# Patient Record
Sex: MALE | Race: OTHER | URBAN - METROPOLITAN AREA
[De-identification: names, ages, dates, MRNs, and addresses within clinical notes are randomized per-mention and may not be internally consistent; named-entity substitution may affect disease eponyms.]

---

## 2022-11-19 ENCOUNTER — INPATIENT (INPATIENT)
Facility: HOSPITAL | Age: 27
LOS: 1 days | Discharge: LEFT BEFORE DISCHARGE | End: 2022-11-21
Attending: INTERNAL MEDICINE | Admitting: INTERNAL MEDICINE
Payer: MEDICAID

## 2022-11-19 VITALS
HEART RATE: 68 BPM | SYSTOLIC BLOOD PRESSURE: 128 MMHG | TEMPERATURE: 98 F | RESPIRATION RATE: 16 BRPM | OXYGEN SATURATION: 98 % | DIASTOLIC BLOOD PRESSURE: 82 MMHG

## 2022-11-19 DIAGNOSIS — Z90.49 ACQUIRED ABSENCE OF OTHER SPECIFIED PARTS OF DIGESTIVE TRACT: Chronic | ICD-10-CM

## 2022-11-19 LAB
ALBUMIN SERPL ELPH-MCNC: 4.6 G/DL — SIGNIFICANT CHANGE UP (ref 3.5–5.2)
ALP SERPL-CCNC: 73 U/L — SIGNIFICANT CHANGE UP (ref 30–115)
ALT FLD-CCNC: 20 U/L — SIGNIFICANT CHANGE UP (ref 0–41)
ANION GAP SERPL CALC-SCNC: 20 MMOL/L — HIGH (ref 7–14)
APPEARANCE UR: CLEAR — SIGNIFICANT CHANGE UP
AST SERPL-CCNC: 26 U/L — SIGNIFICANT CHANGE UP (ref 0–41)
BACTERIA # UR AUTO: NEGATIVE — SIGNIFICANT CHANGE UP
BASE EXCESS BLDV CALC-SCNC: -0.8 MMOL/L — SIGNIFICANT CHANGE UP (ref -2–3)
BASE EXCESS BLDV CALC-SCNC: 1.9 MMOL/L — SIGNIFICANT CHANGE UP (ref -2–3)
BASOPHILS # BLD AUTO: 0.06 K/UL — SIGNIFICANT CHANGE UP (ref 0–0.2)
BASOPHILS NFR BLD AUTO: 0.5 % — SIGNIFICANT CHANGE UP (ref 0–1)
BILIRUB DIRECT SERPL-MCNC: 0.3 MG/DL — SIGNIFICANT CHANGE UP (ref 0–0.3)
BILIRUB INDIRECT FLD-MCNC: 1 MG/DL — SIGNIFICANT CHANGE UP (ref 0.2–1.2)
BILIRUB SERPL-MCNC: 1.3 MG/DL — HIGH (ref 0.2–1.2)
BILIRUB UR-MCNC: NEGATIVE — SIGNIFICANT CHANGE UP
BUN SERPL-MCNC: 9 MG/DL — LOW (ref 10–20)
CA-I SERPL-SCNC: 1.15 MMOL/L — SIGNIFICANT CHANGE UP (ref 1.15–1.33)
CA-I SERPL-SCNC: 1.18 MMOL/L — SIGNIFICANT CHANGE UP (ref 1.15–1.33)
CALCIUM SERPL-MCNC: 9.8 MG/DL — SIGNIFICANT CHANGE UP (ref 8.4–10.4)
CHLORIDE SERPL-SCNC: 97 MMOL/L — LOW (ref 98–110)
CO2 SERPL-SCNC: 18 MMOL/L — SIGNIFICANT CHANGE UP (ref 17–32)
COLOR SPEC: YELLOW — SIGNIFICANT CHANGE UP
COMMENT - URINE: SIGNIFICANT CHANGE UP
CREAT SERPL-MCNC: 0.9 MG/DL — SIGNIFICANT CHANGE UP (ref 0.7–1.5)
DIFF PNL FLD: NEGATIVE — SIGNIFICANT CHANGE UP
EGFR: 122 ML/MIN/1.73M2 — SIGNIFICANT CHANGE UP
EOSINOPHIL # BLD AUTO: 0.02 K/UL — SIGNIFICANT CHANGE UP (ref 0–0.7)
EOSINOPHIL NFR BLD AUTO: 0.2 % — SIGNIFICANT CHANGE UP (ref 0–8)
EPI CELLS # UR: 1 /HPF — SIGNIFICANT CHANGE UP (ref 0–5)
ETHANOL SERPL-MCNC: <10 MG/DL — SIGNIFICANT CHANGE UP
GAS PNL BLDV: 131 MMOL/L — LOW (ref 136–145)
GAS PNL BLDV: 132 MMOL/L — LOW (ref 136–145)
GAS PNL BLDV: SIGNIFICANT CHANGE UP
GLUCOSE SERPL-MCNC: 84 MG/DL — SIGNIFICANT CHANGE UP (ref 70–99)
GLUCOSE UR QL: NEGATIVE — SIGNIFICANT CHANGE UP
HCO3 BLDV-SCNC: 24 MMOL/L — SIGNIFICANT CHANGE UP (ref 22–29)
HCO3 BLDV-SCNC: 24 MMOL/L — SIGNIFICANT CHANGE UP (ref 22–29)
HCT VFR BLD CALC: 45.6 % — SIGNIFICANT CHANGE UP (ref 42–52)
HCT VFR BLDA CALC: 46 % — SIGNIFICANT CHANGE UP (ref 39–51)
HCT VFR BLDA CALC: 48 % — SIGNIFICANT CHANGE UP (ref 39–51)
HGB BLD CALC-MCNC: 15.2 G/DL — SIGNIFICANT CHANGE UP (ref 12.6–17.4)
HGB BLD CALC-MCNC: 16 G/DL — SIGNIFICANT CHANGE UP (ref 12.6–17.4)
HGB BLD-MCNC: 16.3 G/DL — SIGNIFICANT CHANGE UP (ref 14–18)
HYALINE CASTS # UR AUTO: 0 /LPF — SIGNIFICANT CHANGE UP (ref 0–7)
IMM GRANULOCYTES NFR BLD AUTO: 0.3 % — SIGNIFICANT CHANGE UP (ref 0.1–0.3)
KETONES UR-MCNC: ABNORMAL
LACTATE BLDV-MCNC: 2.3 MMOL/L — HIGH (ref 0.5–2)
LACTATE BLDV-MCNC: 5.8 MMOL/L — CRITICAL HIGH (ref 0.5–2)
LEUKOCYTE ESTERASE UR-ACNC: NEGATIVE — SIGNIFICANT CHANGE UP
LIDOCAIN IGE QN: 14 U/L — SIGNIFICANT CHANGE UP (ref 7–60)
LYMPHOCYTES # BLD AUTO: 1.9 K/UL — SIGNIFICANT CHANGE UP (ref 1.2–3.4)
LYMPHOCYTES # BLD AUTO: 15.9 % — LOW (ref 20.5–51.1)
MAGNESIUM SERPL-MCNC: 1.6 MG/DL — LOW (ref 1.8–2.4)
MCHC RBC-ENTMCNC: 30.8 PG — SIGNIFICANT CHANGE UP (ref 27–31)
MCHC RBC-ENTMCNC: 35.7 G/DL — SIGNIFICANT CHANGE UP (ref 32–37)
MCV RBC AUTO: 86.2 FL — SIGNIFICANT CHANGE UP (ref 80–94)
MONOCYTES # BLD AUTO: 0.75 K/UL — HIGH (ref 0.1–0.6)
MONOCYTES NFR BLD AUTO: 6.3 % — SIGNIFICANT CHANGE UP (ref 1.7–9.3)
NEUTROPHILS # BLD AUTO: 9.21 K/UL — HIGH (ref 1.4–6.5)
NEUTROPHILS NFR BLD AUTO: 76.8 % — HIGH (ref 42.2–75.2)
NITRITE UR-MCNC: NEGATIVE — SIGNIFICANT CHANGE UP
NRBC # BLD: 0 /100 WBCS — SIGNIFICANT CHANGE UP (ref 0–0)
PCO2 BLDV: 31 MMHG — LOW (ref 42–55)
PCO2 BLDV: 37 MMHG — LOW (ref 42–55)
PH BLDV: 7.41 — SIGNIFICANT CHANGE UP (ref 7.32–7.43)
PH BLDV: 7.5 — HIGH (ref 7.32–7.43)
PH UR: 7.5 — SIGNIFICANT CHANGE UP (ref 5–8)
PLATELET # BLD AUTO: 413 K/UL — HIGH (ref 130–400)
PO2 BLDV: 46 MMHG — SIGNIFICANT CHANGE UP
PO2 BLDV: 82 MMHG — SIGNIFICANT CHANGE UP
POTASSIUM BLDV-SCNC: 3.1 MMOL/L — LOW (ref 3.5–5.1)
POTASSIUM BLDV-SCNC: 4.8 MMOL/L — SIGNIFICANT CHANGE UP (ref 3.5–5.1)
POTASSIUM SERPL-MCNC: 3.8 MMOL/L — SIGNIFICANT CHANGE UP (ref 3.5–5)
POTASSIUM SERPL-SCNC: 3.8 MMOL/L — SIGNIFICANT CHANGE UP (ref 3.5–5)
PROT SERPL-MCNC: 7.4 G/DL — SIGNIFICANT CHANGE UP (ref 6–8)
PROT UR-MCNC: ABNORMAL
RBC # BLD: 5.29 M/UL — SIGNIFICANT CHANGE UP (ref 4.7–6.1)
RBC # FLD: 12.8 % — SIGNIFICANT CHANGE UP (ref 11.5–14.5)
RBC CASTS # UR COMP ASSIST: 4 /HPF — SIGNIFICANT CHANGE UP (ref 0–4)
SAO2 % BLDV: 75.1 % — SIGNIFICANT CHANGE UP
SAO2 % BLDV: 97.4 % — SIGNIFICANT CHANGE UP
SARS-COV-2 RNA SPEC QL NAA+PROBE: SIGNIFICANT CHANGE UP
SODIUM SERPL-SCNC: 135 MMOL/L — SIGNIFICANT CHANGE UP (ref 135–146)
SP GR SPEC: 1.03 — SIGNIFICANT CHANGE UP (ref 1.01–1.03)
UROBILINOGEN FLD QL: ABNORMAL
WBC # BLD: 11.98 K/UL — HIGH (ref 4.8–10.8)
WBC # FLD AUTO: 11.98 K/UL — HIGH (ref 4.8–10.8)
WBC UR QL: 4 /HPF — SIGNIFICANT CHANGE UP (ref 0–5)

## 2022-11-19 PROCEDURE — 93010 ELECTROCARDIOGRAM REPORT: CPT

## 2022-11-19 PROCEDURE — 71045 X-RAY EXAM CHEST 1 VIEW: CPT | Mod: 26

## 2022-11-19 PROCEDURE — 99285 EMERGENCY DEPT VISIT HI MDM: CPT

## 2022-11-19 PROCEDURE — 99221 1ST HOSP IP/OBS SF/LOW 40: CPT | Mod: GC

## 2022-11-19 RX ORDER — MAGNESIUM SULFATE 500 MG/ML
2 VIAL (ML) INJECTION ONCE
Refills: 0 | Status: COMPLETED | OUTPATIENT
Start: 2022-11-19 | End: 2022-11-19

## 2022-11-19 RX ORDER — LANOLIN ALCOHOL/MO/W.PET/CERES
3 CREAM (GRAM) TOPICAL AT BEDTIME
Refills: 0 | Status: DISCONTINUED | OUTPATIENT
Start: 2022-11-19 | End: 2022-11-21

## 2022-11-19 RX ORDER — FOLIC ACID 0.8 MG
1 TABLET ORAL DAILY
Refills: 0 | Status: DISCONTINUED | OUTPATIENT
Start: 2022-11-19 | End: 2022-11-21

## 2022-11-19 RX ORDER — ALBUTEROL 90 UG/1
2 AEROSOL, METERED ORAL EVERY 6 HOURS
Refills: 0 | Status: DISCONTINUED | OUTPATIENT
Start: 2022-11-19 | End: 2022-11-21

## 2022-11-19 RX ORDER — PANTOPRAZOLE SODIUM 20 MG/1
40 TABLET, DELAYED RELEASE ORAL
Refills: 0 | Status: DISCONTINUED | OUTPATIENT
Start: 2022-11-19 | End: 2022-11-21

## 2022-11-19 RX ORDER — SODIUM CHLORIDE 9 MG/ML
1000 INJECTION INTRAMUSCULAR; INTRAVENOUS; SUBCUTANEOUS
Refills: 0 | Status: DISCONTINUED | OUTPATIENT
Start: 2022-11-19 | End: 2022-11-21

## 2022-11-19 RX ORDER — ACETAMINOPHEN 500 MG
650 TABLET ORAL EVERY 6 HOURS
Refills: 0 | Status: DISCONTINUED | OUTPATIENT
Start: 2022-11-19 | End: 2022-11-21

## 2022-11-19 RX ORDER — ONDANSETRON 8 MG/1
4 TABLET, FILM COATED ORAL EVERY 8 HOURS
Refills: 0 | Status: DISCONTINUED | OUTPATIENT
Start: 2022-11-19 | End: 2022-11-21

## 2022-11-19 RX ORDER — THIAMINE MONONITRATE (VIT B1) 100 MG
100 TABLET ORAL DAILY
Refills: 0 | Status: DISCONTINUED | OUTPATIENT
Start: 2022-11-19 | End: 2022-11-21

## 2022-11-19 RX ORDER — SODIUM CHLORIDE 9 MG/ML
1000 INJECTION, SOLUTION INTRAVENOUS ONCE
Refills: 0 | Status: COMPLETED | OUTPATIENT
Start: 2022-11-19 | End: 2022-11-19

## 2022-11-19 RX ADMIN — SODIUM CHLORIDE 100 MILLILITER(S): 9 INJECTION INTRAMUSCULAR; INTRAVENOUS; SUBCUTANEOUS at 23:56

## 2022-11-19 RX ADMIN — Medication 25 GRAM(S): at 15:39

## 2022-11-19 RX ADMIN — SODIUM CHLORIDE 1000 MILLILITER(S): 9 INJECTION, SOLUTION INTRAVENOUS at 14:50

## 2022-11-19 RX ADMIN — Medication 1 MILLIGRAM(S): at 16:54

## 2022-11-19 RX ADMIN — Medication 50 MILLIGRAM(S): at 13:49

## 2022-11-19 RX ADMIN — SODIUM CHLORIDE 1000 MILLILITER(S): 9 INJECTION, SOLUTION INTRAVENOUS at 14:09

## 2022-11-19 RX ADMIN — Medication 25 MILLIGRAM(S): at 21:52

## 2022-11-19 NOTE — ED ADULT NURSE NOTE - OBJECTIVE STATEMENT
pt here for alcohol detox. pt last drink was wednesday reports he woke up with the shakes. -dizziness -headaches -nausea -vomiting -auditory hallucinations -tactile hallucinations -visual hallucinations. no hx of etoh withdrawal.

## 2022-11-19 NOTE — ED ADULT TRIAGE NOTE - CHIEF COMPLAINT QUOTE
pt requesting detox from alcohol. states his last alcoholic drink was on Wednesday 11/16. pt states he has been "having the shakes and sweating"

## 2022-11-19 NOTE — ED ADULT TRIAGE NOTE - ACCOMPANIED BY
Nasal Endoscopy preformed today.  On exam ears look good, no masses or lesions were seen.  Wax was removed from right ear.        Epley Maneuver for Left BPPV          1. Sit with legs out in front of you  2. Turn your head to left 45 degrees  3. Lay down with your head in this position  4. Slowly turn your head to right to 45 degrees  5. Roll onto your right side while maintaining head position  6. Sit up     Instructions to Follow after Epley Maneuver:  Sleep semi-recumbent for the next two nights. This means sleep with your head long-term between being flat and upright (a 45 degrees angle). This is most easily done by using a recliner chair or by using pillows arranged on a couch. During the day, try to keep your head vertical. For at least one week following, avoid provoking head positions such as head-extend position, in which your lying on your back, especially with your head turned towards the affected side. Avoid sleeping on the bad side. This may bring on symptoms.   Epley Maneuver for Right BPPV          1. Sit with legs out in front of you  2. Turn your head to the right 45 degrees  3. Lay down with your head in this position  4. Slowly turn your head to left 45 degrees  5. Roll onto your left side while maintaining head position  6. Sit up    Instructions to Follow after Epley Maneuver:  Sleep semi-recumbent for the next two nights. This means sleep with your head long-term between being flat and upright (a 45 degrees angle). This is most easily done by using a recliner chair or by using pillows arranged on a couch. During the day, try to keep your head vertical. For at least one week following, avoid provoking head positions such as head-extend position, in which your lying on your back, especially with your head turned towards the affected side. Avoid sleeping on the bad side. This may bring on symptoms.          
Self

## 2022-11-19 NOTE — ED ADULT NURSE REASSESSMENT NOTE - NS ED NURSE REASSESS COMMENT FT1
hand off report given by previous nurse. Pt is A&Ox4 came for detox and admit to tele waiting for bed. Pt was given ativan and librium for etoh withdrawal. Pt denies hallucinations, tremors, generalized pain at this moment. Comfort measure in place. will continue to monitor pt.

## 2022-11-19 NOTE — H&P ADULT - HISTORY OF PRESENT ILLNESS
27 year old male, past medical history alcohol use, who presents for     VITALS:   T(C): 37.1 (11-19-22 @ 15:57), Max: 37.1 (11-19-22 @ 15:57)  HR: 85 (11-19-22 @ 15:57) (68 - 85)  BP: 132/81 (11-19-22 @ 15:57) (128/82 - 132/81)  RR: 16 (11-19-22 @ 15:57) (16 - 16)  SpO2: 98% (11-19-22 @ 15:57) (98% - 98%)    in ED, vitals were WNLs. labs were significant for hypomagnesemia (1.6). patient received magnesium and admitted to telemetry for alcohol withdrawal management  27 year old male, past medical history alcohol use and asthma, who presents for shakiness. history goes back to age 19 when patient started drinking alcohol. patient drinks [place] whiskey. he used to drink moderately but recently he started drinking a bottle of whiskey per day attributed to going out with friends frequently to pubs. patient noticed that following each night out with his friends, he was having episodes of nausea and vomiting in the morning, hence, patient decided to stop alcohol completely 5 days ago. next day patient started complaining of tremor, vomiting, nausea, sweating, feeling of muscle tightness, headaches, buit denied hallucinations, tactile disturbances or confusion.    VITALS:   T(C): 37.1 (11-19-22 @ 15:57), Max: 37.1 (11-19-22 @ 15:57)  HR: 85 (11-19-22 @ 15:57) (68 - 85)  BP: 132/81 (11-19-22 @ 15:57) (128/82 - 132/81)  RR: 16 (11-19-22 @ 15:57) (16 - 16)  SpO2: 98% (11-19-22 @ 15:57) (98% - 98%)    in ED, vitals were WNLs. labs were significant for hypomagnesemia (1.6). patient received magnesium and admitted to telemetry for alcohol withdrawal management

## 2022-11-19 NOTE — H&P ADULT - NSHPSOCIALHISTORY_GEN_ALL_CORE
smokes weed  no illicit drug use  non COVID vaccinated  lives with his mother and two sisters  recently graduated highschool. not planning to go to college. not able find a job, attributed to covid as per patient

## 2022-11-19 NOTE — H&P ADULT - NSHPLABSRESULTS_GEN_ALL_CORE
LABS:                          16.3   11.98 )-----------( 413      ( 2022 14:12 )             45.6         135  |  97<L>  |  9<L>  ----------------------------<  84  3.8   |  18  |  0.9    Ca    9.8      2022 14:12  Mg     1.6         TPro  7.4  /  Alb  4.6  /  TBili  1.3<H>  /  DBili  0.3  /  AST  26  /  ALT  20  /  AlkPhos  73      LIVER FUNCTIONS - ( 2022 14:12 )  Alb: 4.6 g/dL / Pro: 7.4 g/dL / ALK PHOS: 73 U/L / ALT: 20 U/L / AST: 26 U/L / GGT: x             Urinalysis Basic - ( 2022 15:21 )    Color: Yellow / Appearance: Clear / S.028 / pH: x  Gluc: x / Ketone: Large  / Bili: Negative / Urobili: 12 mg/dL   Blood: x / Protein: 30 mg/dL / Nitrite: Negative   Leuk Esterase: Negative / RBC: 4 /HPF / WBC 4 /HPF   Sq Epi: x / Non Sq Epi: 1 /HPF / Bacteria: Negative

## 2022-11-19 NOTE — ED PROVIDER NOTE - NS ED ROS FT
Review of Systems:  	•	CONSTITUTIONAL: no fever   	•	SKIN: no rash   	•	ENT: no sore throat   	•	RESPIRATORY: no shortness of breath, no cough  	•	CARDIAC: no chest pain, no palpitations  	•	GI: no abd pain, +nausea, no vomiting, no diarrhea, no constipation  	•	GENITO-URINARY: no discharge, no dysuria; no hematuria, no increased urinary frequency  	•	MUSCULOSKELETAL: no joint paint, no swelling, no redness  	•	NEUROLOGIC: +weakness, +tremors, no headache, no syncope   	•	PSYCH: +anxiety, non suicidal, non homicidal, no hallucination, no depression

## 2022-11-19 NOTE — ED PROVIDER NOTE - ATTENDING APP SHARED VISIT CONTRIBUTION OF CARE
27 yr old m w/ a pmh significant for substance abuse who presents requesting detox. Pt states that he binged drink for ~ 2 weeks and then abruptly stopped drinking on Monday. Since then be complains of tremors, nausea and vomiting. Pt denies any other complaints.     VITAL SIGNS: I have reviewed nursing notes and confirm.  CONSTITUTIONAL: non-toxic, well appearing  SKIN: no rash, no petechiae.  EYES: EOMI, pink conjunctiva, anicteric  ENT: tongue midline, no exudates, MMM  NECK: Supple; no meningismus, no JVD  CARD: RRR, no murmurs, equal radial pulses bilaterally 2+  RESP: CTAB, no respiratory distress  ABD: Soft, non-tender, non-distended, no peritoneal signs, no HSM, no CVA tenderness  EXT: Normal ROM x4. No edema. No calves tenderness  NEURO: Alert, oriented x 3     a/p  27 yr old m that presents requesting detox  -labs  -imaging  -ivf  -reassess  -dispo pending

## 2022-11-19 NOTE — SBIRT NOTE ADULT - NSSBIRTDRGNOACTINTDET_GEN_A_CORE
Pt did not indicated any substance use at the time of encounter with this LCSW. However, further assessment may be helpful once Pt engages with the CATCH team regarding ETOH misuse.

## 2022-11-19 NOTE — SBIRT NOTE ADULT - NSSBIRTBRIEFINTDET_GEN_A_CORE
Supportive counseling and psychoeducation provided regarding overall impact of ETOH use on health and wellbeing. Pt indicated no past treatment for ETOH, may be receptive pending CATCH team intervention.

## 2022-11-19 NOTE — SBIRT NOTE ADULT - NSSBIRTFEELGUILT_GEN_A_CORE
Pt reports that he feels bad after drinking, which is also reinforced by commentary received from his family about his drinking use./Daily or almost daily

## 2022-11-19 NOTE — ED PROVIDER NOTE - NS ED MD DISPO ADMITTING SERVICE
This is a historical note converted from [employer]. Formatting and pictures may have been removed.  Please reference [place] for original formatting and attached multimedia. Admit and Discharge Dates  Admit Date: 05/12/2019  Discharge Date: 05/13/2019  ?  Physicians  Attending Physician - Mary OSBORNE, Hilario ARRIAGA, Svetlana OSBORNE, Skyler Wilkes  Admitting Physician - Hilario Hernandez MD?  Consulting Physician - Rebecca OSBORNE, Dolores  Primary Care Physician - Tomas Bolaños MD  Discharge Diagnosis  1.?CHF NYHA class I?I50.9  2.?Aortic stenosis?I35.0  3.?CAD (coronary artery disease)?I25.10  4.?Peripheral vascular disease?I73.9  5.?History of hypertension?Z86.79  6.?Hyperlipidemia?E78.5  7. Incidental Pulmonary Nodules  Surgical Procedures  No procedures recorded for this visit.  Immunizations  No immunizations recorded for this visit.  ?  Admission Information  ? Mr. Justyn Perez?is a 76-year-old gentleman with a past medical history significant for aortic stenosis, hypertension, hyperlipidemia, peripheral vascular disease who presents to the ED with complaints of shortness of breath ongoing since Thursday. ?Patient states that he has had occasional episodes in the past, however over the last few days it is been getting worse.??He usually exercises at the gym and lifts weights with no issues, however noticed?progressive PAZ?and subsequently decided to get evaluated. ?Denies any chest pain, palpitations,?headaches, changes in vision, nausea/vomiting. ?Of note, he did try to use some home oxygen?last night?to help with his symptoms, but states that it did not do anything. ?He does endorse?a recent?difficulty swallowing, feels like?the food gets stuck in the middle of his throat.? Unable to differentiate differences between solids and liquids. ?Denies any pain with swallowing.? Otherwise he denies any fevers or chills?and is?in his normal state of health. ?Reports compliance with most of his medications. ?Does state that he takes his  antihypertensives when he needs to and has not been taking his metformin or statin?due to weight loss.  ?? In the ED, VS significant for hypertension.? Lab work was significant for a proBNP of 3660, troponin was slightly elevated 0.063.? Chest x-ray was performed as well as CT thorax which revealed moderate effusions bilaterally.? EKG revealed signs of left ventricular hypertrophy with?T wave changes consistent with?cardiac strain.?Medicine was consulted for evaluation of elevated BNP and troponin.  Hospital Course  ? Patient was admitted to telemetry for observation. ?He was diagnosed with?an acute exacerbation of congestive heart failure?and treated with IV Lasix that resulted in good urine output. He was continued on aspirin, atorvastatin and initiated on lisinopril.? Beta-blockers were held in the setting of acute exacerbation.? TTE ordered with results as described below.? Cardiology was consulted for further recommendations in the setting of aortic stenosis and known significant peripheral vascular disease to include carotid artery stenosis, mesenteric artery stenosis, renal artery stenosis. Patient responded well to treatment and symptomatically improved.? His shortness of breath resolved, lower extremity edema resolved and he was ambulating well independently.? Subsequently he was deemed medically fit for discharge home.  Significant Findings  Echocardiogram on 5/13/2019: LVEF approximately 50%, LV is normal in size with concentric hypertrophy, blood from is dilated.? Moderate calcific aortic valve stenosis with moderate aortic regurg noted.? Mitral annular calcification present.? Small pericardial effusion with no hemodynamic significance.  Time Spent on discharge  >?30 minutes  Objective  Vitals & Measurements  T:?36.6? ?C (Oral)? TMIN:?36.3? ?C (Oral)? TMAX:?37.0? ?C (Oral)? HR:?63(Peripheral)? RR:?18? BP:?100/62? SpO2:?93%?  Physical Exam  See progress note from today  Patient Discharge  Condition  Clinically and hemodynamically stable  Discharge Disposition  Home   Discharge Medication Reconciliation  Prescribed  aspirin (aspirin 81 mg oral tablet, CHEWABLE)?81 mg, Oral, Daily  atorvastatin (atorvastatin 80 mg oral tablet)?80 mg, Oral, Daily  furosemide (Lasix 20 mg oral tablet)?20 mg, Oral, Every other day  lisinopril (lisinopril 10 mg oral tablet)?10 mg, Oral, Daily  ?   Discontinue  carvedilol (carvedilol 25 mg oral tablet)?25 mg, Oral, BID  ?  Education and Orders Provided  Aortic Valve Stenosis  Shortness of Breath, Adult, Easy-to-Read  Heart Failure, Easy-to-Read  Low-Sodium Eating Plan  Blank for instructions (PHYSICIAN) (Custom)  Discharge - 05/13/19 13:16:00 CDT, Home?  ?  Follow up  [facility], within 1 month  ????Post [facility] visit, will continue as PCP  Report to Emergency Department if symptoms return or worsen  [facility], within 1 to 2 weeks  ?   *Will need repeat CT thorax in 6 months to evaluate stability of pulmonary nodules  ?      I was present with the Resident during discharge day management.  ???  [x ] I discussed the case with the Resident and agree with the discharge plan as above.  [ ] I discussed the case with the Resident and agree with the discharge plan as above except:  ???  Time spent on discharge [40 ] minutes  ?   Only moderate aortic stenosis noted, followup with cardiology clinic for same  Symptomatically improved with diuresis  Plan to continue goal directed medication at home  ?   MD Missael  ID Staff   MED

## 2022-11-19 NOTE — ED PROVIDER NOTE - NS ED ATTENDING STATEMENT MOD
This was a shared visit with the TRACEE. I reviewed and verified the documentation and independently performed the documented:

## 2022-11-19 NOTE — H&P ADULT - ATTENDING COMMENTS
HPI:   27 year old male, past medical history alcohol use and asthma, who presents for shakiness. history goes back to age 19 when patient started drinking alcohol. patient drinks [place] whiskey. he used to drink moderately but recently he started drinking a bottle of whiskey per day attributed to going out with friends frequently to pubs. patient noticed that following each night out with his friends, he was having episodes of nausea and vomiting in the morning, hence, patient decided to stop alcohol completely 5 days ago. next day patient started complaining of tremor, vomiting, nausea, sweating, feeling of muscle tightness, headaches, buit denied hallucinations, tactile disturbances or confusion.    VITALS:   T(C): 37.1 (11-19-22 @ 15:57), Max: 37.1 (11-19-22 @ 15:57)  HR: 85 (11-19-22 @ 15:57) (68 - 85)  BP: 132/81 (11-19-22 @ 15:57) (128/82 - 132/81)  RR: 16 (11-19-22 @ 15:57) (16 - 16)  SpO2: 98% (11-19-22 @ 15:57) (98% - 98%)    in ED, vitals were WNLs. labs were significant for hypomagnesemia (1.6). patient received magnesium and admitted to telemetry for alcohol withdrawal management (19 Nov 2022 19:21)    REVIEW OF SYSTEMS: see cc/HPI   CONSTITUTIONAL: No weakness, fevers or chills  EYES/ENT: No visual changes;  No vertigo or throat pain   NECK: No pain or stiffness  RESPIRATORY: No cough, wheezing, hemoptysis; No shortness of breath  CARDIOVASCULAR: No chest pain or palpitations  GASTROINTESTINAL: No abdominal or epigastric pain. No nausea, vomiting, or hematemesis; No diarrhea or constipation. No melena or hematochezia.  GENITOURINARY: No dysuria, frequency or hematuria  NEUROLOGICAL: No numbness or weakness  SKIN: No itching, rashes    Physical Exam:  General: WN/WD NAD  Neurology: A&Ox3, nonfocal, follows commands  Eyes: PERRLA/ EOMI  ENT/Neck: Neck supple, trachea midline, No JVD  Respiratory: CTA B/L, No wheezing, rales, rhonchi  CV: Normal rate regular rhythm, S1S2, no murmurs, rubs or gallops  Abdominal: Soft, NT, ND +BS,   Extremities: No edema, + peripheral pulses  Skin: No Rashes, Hematoma, Ecchymosis    A/P  Alcohol withdrawal   Alcohol use disorder    Hypomagnesemia 2/2 EtOH abuse     Suspected vitamin / Folate / thiamine    DVT prophylaxis HPI:   27 year old male, past medical history alcohol use and asthma, who presents for shakiness. history goes back to age 19 when patient started drinking alcohol. patient drinks [place] whiskey. he used to drink moderately but recently he started drinking a bottle of whiskey per day attributed to going out with friends frequently to pubs. patient noticed that following each night out with his friends, he was having episodes of nausea and vomiting in the morning, hence, patient decided to stop alcohol completely 5 days ago. next day patient started complaining of tremor, vomiting, nausea, sweating, feeling of muscle tightness, headaches, but denied hallucinations, tactile disturbances or confusion.    VITALS:   T(C): 37.1 (11-19-22 @ 15:57), Max: 37.1 (11-19-22 @ 15:57)  HR: 85 (11-19-22 @ 15:57) (68 - 85)  BP: 132/81 (11-19-22 @ 15:57) (128/82 - 132/81)  RR: 16 (11-19-22 @ 15:57) (16 - 16)  SpO2: 98% (11-19-22 @ 15:57) (98% - 98%)    in ED, vitals were WNLs. labs were significant for hypomagnesemia (1.6). patient received magnesium and admitted to telemetry for alcohol withdrawal management (19 Nov 2022 19:21)    REVIEW OF SYSTEMS: see cc/HPI   CONSTITUTIONAL: No weakness, fevers or chills  EYES/ENT: No visual changes;  No vertigo or throat pain   NECK: No pain or stiffness  RESPIRATORY: No cough, wheezing, hemoptysis; No shortness of breath  CARDIOVASCULAR: No chest pain or palpitations  GASTROINTESTINAL: No abdominal or epigastric pain. No nausea, vomiting, or hematemesis; No diarrhea or constipation. No melena or hematochezia.  GENITOURINARY: No dysuria, frequency or hematuria  NEUROLOGICAL: No numbness or weakness  SKIN: No itching, rashes    Physical Exam:  General: WN/WD NAD, Resting comfortably   Neurology: A&Ox3, nonfocal, follows commands  Eyes: PERRLA/ EOMI  ENT/Neck: Neck supple, trachea midline, No JVD  Respiratory: CTA B/L, No wheezing, rales, rhonchi  CV: Normal rate regular rhythm, S1S2, no murmurs, rubs or gallops  Abdominal: Soft, NT, ND +BS,   Extremities: No edema, + peripheral pulses  Skin: No Rashes, Hematoma, Ecchymosis    A/P  Alcohol withdrawal   Alcohol use disorder  -IV fluids   -CIWA protocol w/ Ativan PRN   -CATCH team eval     Hypomagnesemia 2/2 EtOH abuse   -supplement and recheck levels     Suspected vitamin / Folate / thiamine  -supplement daily - MVI/Thiamine / Folate/ B12    DVT prophylaxis

## 2022-11-19 NOTE — ED PROVIDER NOTE - OBJECTIVE STATEMENT
27 year old male, past medical history alcohol use, who presents for eval. patient admits to drinking alcohol daily, ~1 bottle elvira, stopped drinking x3 days ago now with tremors. patient with anxiety, tremors, nausea/vomiting. denies fever, chills, chest pain, shortness of breath, back pain, abd pain, diarrhea, urinary symptoms, skin changes, syncope. +marijuana use.

## 2022-11-19 NOTE — SBIRT NOTE ADULT - NSSBIRTCONCERNEDDRINK_GEN_A_CORE
Mother has brought him to ER due to his drinking, has also thrown out his alcohol/Yes, during the last year

## 2022-11-19 NOTE — H&P ADULT - NSHPPHYSICALEXAM_GEN_ALL_CORE
GENERAL: NAD, lying in bed comfortably  HEAD:  Atraumatic, Normocephalic  EYES: EOMI, PERRLA, conjunctiva and sclera clear  ENT: Moist mucous membranes  NECK: Supple, No JVD  CHEST/LUNG: Clear to auscultation bilaterally; No rales, rhonchi, wheezing, or rubs. Unlabored respirations  HEART: Regular rate and rhythm; No murmurs, rubs, or gallops  ABDOMEN: BSx4; Soft, nontender, nondistended  EXTREMITIES:  2+ Peripheral Pulses, brisk capillary refill. No clubbing, cyanosis, or edema  NERVOUS SYSTEM:  A&Ox3, no focal deficits   SKIN: No rashes or lesions

## 2022-11-19 NOTE — ED PROVIDER NOTE - PHYSICAL EXAMINATION
CONSTITUTIONAL: tremulous male, no acute distress  SKIN: skin exam is warm and dry  EYES: PERRL, EOMI, conjunctiva and sclera clear.  ENT: Dry MM   CARD: S1, S2 normal, no murmur  RESP: No wheezes, rales or rhonchi. Good air movement bilaterally  ABD: soft; non-distended; non-tender.    EXT: Normal ROM.   NEURO: awake, alert, following commands, oriented, grossly unremarkable. +tremors,, no fasciculations. No Focal deficits. GCS 15.   PSYCH: anxious appearing, cooperative and appropriate

## 2022-11-19 NOTE — H&P ADULT - ASSESSMENT
27 year old male, past medical history alcohol use, who presents for     #alcohol withdrawal  #h/o alcohol abuse  - librium taper  - ativan PRN  - CATCH team eval    #hypomagnesemia  - repleted  - monitor    #suspected folate and thiamine deficiency  - c/w folate and thiamine supplementation    #MISC  DVT PPX  GI PPX  DIET  CODE 27 year old male, past medical history alcohol use, who presents for tremor, sweating, N/V after stopping alcohol 5 days ago    #alcohol withdrawal  #h/o alcohol abuse  - drinks [place] whiskey 1 bottle/day -> started recently as patient is going frequently to pubs with his friends but patient started drinking alcohol at age 19  - unemployed, recently graduated highschool, no plans for college  - lives with mother and 2 sisters  - librium taper  - ativan PRN  - CATCH team eval    #hypomagnesemia  - repleted  - monitor    #suspected folate and thiamine deficiency  - c/w folate and thiamine supplementation    #MISC  DVT PPX  GI PPX  DIET  CODE 27 year old male, past medical history alcohol use, who presents for tremor, sweating, N/V after stopping alcohol 5 days ago    #alcohol withdrawal  #h/o alcohol abuse  - drinks [place] whiskey 1 bottle/day -> started recently as patient is going frequently to pubs with his friends but patient started drinking alcohol at age 19  - patient was having nausea and  vomiting next morning after drinking alcohol  - decided to stop completely 5 days ago  - started complaining of tremor, N/V, headache, sweating  - unemployed, recently graduated highschool, no plans for college  - lives with mother and 2 sisters  - hemodynamically stable  - s/p magnesium in ED  - c/w librium taper  - c/w ativan PRN  - CATCH team eval    #hypomagnesemia  - repleted  - monitor    #suspected folate and thiamine deficiency  - c/w folate and thiamine supplementation    #MISC  DVT PPX not indicated  GI PPX Protonix  DIET Regular  CODE FULL

## 2022-11-20 LAB
ALBUMIN SERPL ELPH-MCNC: 3.8 G/DL — SIGNIFICANT CHANGE UP (ref 3.5–5.2)
ALP SERPL-CCNC: 59 U/L — SIGNIFICANT CHANGE UP (ref 30–115)
ALT FLD-CCNC: 16 U/L — SIGNIFICANT CHANGE UP (ref 0–41)
ANION GAP SERPL CALC-SCNC: 12 MMOL/L — SIGNIFICANT CHANGE UP (ref 7–14)
AST SERPL-CCNC: 20 U/L — SIGNIFICANT CHANGE UP (ref 0–41)
BASOPHILS # BLD AUTO: 0.07 K/UL — SIGNIFICANT CHANGE UP (ref 0–0.2)
BASOPHILS NFR BLD AUTO: 0.7 % — SIGNIFICANT CHANGE UP (ref 0–1)
BILIRUB SERPL-MCNC: 1.2 MG/DL — SIGNIFICANT CHANGE UP (ref 0.2–1.2)
BUN SERPL-MCNC: 6 MG/DL — LOW (ref 10–20)
CALCIUM SERPL-MCNC: 9 MG/DL — SIGNIFICANT CHANGE UP (ref 8.4–10.4)
CHLORIDE SERPL-SCNC: 99 MMOL/L — SIGNIFICANT CHANGE UP (ref 98–110)
CO2 SERPL-SCNC: 24 MMOL/L — SIGNIFICANT CHANGE UP (ref 17–32)
CREAT SERPL-MCNC: 0.7 MG/DL — SIGNIFICANT CHANGE UP (ref 0.7–1.5)
EGFR: 131 ML/MIN/1.73M2 — SIGNIFICANT CHANGE UP
EOSINOPHIL # BLD AUTO: 0.16 K/UL — SIGNIFICANT CHANGE UP (ref 0–0.7)
EOSINOPHIL NFR BLD AUTO: 1.5 % — SIGNIFICANT CHANGE UP (ref 0–8)
GLUCOSE SERPL-MCNC: 81 MG/DL — SIGNIFICANT CHANGE UP (ref 70–99)
HCT VFR BLD CALC: 40.4 % — LOW (ref 42–52)
HGB BLD-MCNC: 14.8 G/DL — SIGNIFICANT CHANGE UP (ref 14–18)
IMM GRANULOCYTES NFR BLD AUTO: 0.3 % — SIGNIFICANT CHANGE UP (ref 0.1–0.3)
LACTATE SERPL-SCNC: 1.8 MMOL/L — SIGNIFICANT CHANGE UP (ref 0.7–2)
LACTATE SERPL-SCNC: 3.1 MMOL/L — HIGH (ref 0.7–2)
LYMPHOCYTES # BLD AUTO: 2.16 K/UL — SIGNIFICANT CHANGE UP (ref 1.2–3.4)
LYMPHOCYTES # BLD AUTO: 20.5 % — SIGNIFICANT CHANGE UP (ref 20.5–51.1)
MAGNESIUM SERPL-MCNC: 1.9 MG/DL — SIGNIFICANT CHANGE UP (ref 1.8–2.4)
MCHC RBC-ENTMCNC: 31 PG — SIGNIFICANT CHANGE UP (ref 27–31)
MCHC RBC-ENTMCNC: 36.6 G/DL — SIGNIFICANT CHANGE UP (ref 32–37)
MCV RBC AUTO: 84.5 FL — SIGNIFICANT CHANGE UP (ref 80–94)
MONOCYTES # BLD AUTO: 1 K/UL — HIGH (ref 0.1–0.6)
MONOCYTES NFR BLD AUTO: 9.5 % — HIGH (ref 1.7–9.3)
NEUTROPHILS # BLD AUTO: 7.11 K/UL — HIGH (ref 1.4–6.5)
NEUTROPHILS NFR BLD AUTO: 67.5 % — SIGNIFICANT CHANGE UP (ref 42.2–75.2)
NRBC # BLD: 0 /100 WBCS — SIGNIFICANT CHANGE UP (ref 0–0)
PLATELET # BLD AUTO: 323 K/UL — SIGNIFICANT CHANGE UP (ref 130–400)
POTASSIUM SERPL-MCNC: 3.1 MMOL/L — LOW (ref 3.5–5)
POTASSIUM SERPL-SCNC: 3.1 MMOL/L — LOW (ref 3.5–5)
PROT SERPL-MCNC: 5.9 G/DL — LOW (ref 6–8)
RBC # BLD: 4.78 M/UL — SIGNIFICANT CHANGE UP (ref 4.7–6.1)
RBC # FLD: 12.7 % — SIGNIFICANT CHANGE UP (ref 11.5–14.5)
SODIUM SERPL-SCNC: 135 MMOL/L — SIGNIFICANT CHANGE UP (ref 135–146)
WBC # BLD: 10.53 K/UL — SIGNIFICANT CHANGE UP (ref 4.8–10.8)
WBC # FLD AUTO: 10.53 K/UL — SIGNIFICANT CHANGE UP (ref 4.8–10.8)

## 2022-11-20 PROCEDURE — 99233 SBSQ HOSP IP/OBS HIGH 50: CPT

## 2022-11-20 RX ORDER — ZOLPIDEM TARTRATE 10 MG/1
5 TABLET ORAL ONCE
Refills: 0 | Status: DISCONTINUED | OUTPATIENT
Start: 2022-11-20 | End: 2022-11-20

## 2022-11-20 RX ADMIN — Medication 100 MILLIGRAM(S): at 12:11

## 2022-11-20 RX ADMIN — Medication 3 MILLIGRAM(S): at 02:42

## 2022-11-20 RX ADMIN — Medication 25 MILLIGRAM(S): at 02:02

## 2022-11-20 RX ADMIN — Medication 25 MILLIGRAM(S): at 10:22

## 2022-11-20 RX ADMIN — Medication 25 MILLIGRAM(S): at 13:03

## 2022-11-20 RX ADMIN — Medication 2 MILLIGRAM(S): at 16:50

## 2022-11-20 RX ADMIN — Medication 25 MILLIGRAM(S): at 05:50

## 2022-11-20 RX ADMIN — Medication 1 TABLET(S): at 12:11

## 2022-11-20 RX ADMIN — Medication 25 MILLIGRAM(S): at 18:00

## 2022-11-20 RX ADMIN — ZOLPIDEM TARTRATE 5 MILLIGRAM(S): 10 TABLET ORAL at 04:59

## 2022-11-20 RX ADMIN — Medication 1 MILLIGRAM(S): at 12:11

## 2022-11-20 RX ADMIN — PANTOPRAZOLE SODIUM 40 MILLIGRAM(S): 20 TABLET, DELAYED RELEASE ORAL at 06:50

## 2022-11-20 RX ADMIN — Medication 20 MILLIGRAM(S): at 22:45

## 2022-11-20 RX ADMIN — Medication 650 MILLIGRAM(S): at 14:38

## 2022-11-20 NOTE — PROGRESS NOTE ADULT - ASSESSMENT
27 year old male, past medical history alcohol use, who presents for tremor, sweating, N/V after stopping alcohol 5 days ago    #alcohol withdrawal  #h/o alcohol abuse  - drinks [place] whiskey 1 bottle/day -> started recently as patient is going frequently to pubs with his friends but patient started drinking alcohol at age 19  - patient was having nausea and  vomiting next morning after drinking alcohol  - decided to stop completely 5 days ago  - started complaining of tremor, N/V, headache, sweating  - unemployed, recently graduated highschool, no plans for college  - lives with mother and 2 sisters  - hemodynamically stable  - s/p magnesium in ED  - c/w librium taper  - c/w ativan PRN  - CATCH team eval    #suspected magnesium deff  - repleted  - monitor    #suspected folate and thiamine deficiency  - c/w folate and thiamine supplementation    #lactemia- will trend 5.8-->2.35    #Progress Note Handoff  Pending (specify):  librium helen, lactate  Disposition: home  Anticipated date: 48-72hrs

## 2022-11-20 NOTE — PROGRESS NOTE ADULT - SUBJECTIVE AND OBJECTIVE BOX
Pt seen and examined at bedside. No CP or SOB.      PAST MEDICAL & SURGICAL HISTORY:  Asthma    History of appendectomy        VITAL SIGNS (Last 24 hrs):  T(C): 36.8 (11-20-22 @ 09:35), Max: 37.1 (11-19-22 @ 15:57)  HR: 80 (11-20-22 @ 09:35) (72 - 85)  BP: 98/67 (11-20-22 @ 09:35) (98/67 - 132/81)  RR: 18 (11-20-22 @ 09:35) (16 - 18)  SpO2: 98% (11-20-22 @ 09:35) (96% - 98%)  Wt(kg): --  Daily     Daily     I&O's Summary      PHYSICAL EXAM:  GENERAL: NAD, well-developed  HEAD:  Atraumatic, Normocephalic  EYES: EOMI, PERRLA, conjunctiva and sclera clear  NECK: Supple, No JVD  CHEST/LUNG: Clear to auscultation bilaterally; No wheeze  HEART: Regular rate and rhythm; No murmurs, rubs, or gallops  ABDOMEN: Soft, Nontender, Nondistended; Bowel sounds present  EXTREMITIES:  2+ Peripheral Pulses, No clubbing, cyanosis, or edema  PSYCH: AAOx3  NEUROLOGY: non-focal  SKIN: No rashes or lesions    Labs Reviewed  Spoke to patient in regards to abnormal labs.    CBC Full  -  ( 20 Nov 2022 06:16 )  WBC Count : 10.53 K/uL  Hemoglobin : 14.8 g/dL  Hematocrit : 40.4 %  Platelet Count - Automated : 323 K/uL  Mean Cell Volume : 84.5 fL  Mean Cell Hemoglobin : 31.0 pg  Mean Cell Hemoglobin Concentration : 36.6 g/dL  Auto Neutrophil # : 7.11 K/uL  Auto Lymphocyte # : 2.16 K/uL  Auto Monocyte # : 1.00 K/uL  Auto Eosinophil # : 0.16 K/uL  Auto Basophil # : 0.07 K/uL  Auto Neutrophil % : 67.5 %  Auto Lymphocyte % : 20.5 %  Auto Monocyte % : 9.5 %  Auto Eosinophil % : 1.5 %  Auto Basophil % : 0.7 %    BMP:    11-20 @ 06:16    Blood Urea Nitrogen - 6  Calcium - 9.0  Carbond Dioxide - 24  Chloride - 99  Creatinine - 0.7  Glucose - 81  Potassium - 3.1  Sodium - 135      Hemoglobin A1c -     Urine Culture:        COVID Labs  CRP:      D-Dimer:            Imaging reviewed independently and reviewed read  < from: Xray Chest 1 View- PORTABLE-Urgent (Xray Chest 1 View- PORTABLE-Urgent .) (11.19.22 @ 16:17) >  Impression:  Low lung volume without definite evidence of focal consolidation pleural   effusion or pneumothorax.    < end of copied text >        MEDICATIONS  (STANDING):  chlordiazePOXIDE   Oral   chlordiazePOXIDE 25 milliGRAM(s) Oral every 4 hours  chlordiazePOXIDE 20 milliGRAM(s) Oral every 4 hours  folic acid 1 milliGRAM(s) Oral daily  multivitamin 1 Tablet(s) Oral daily  pantoprazole    Tablet 40 milliGRAM(s) Oral before breakfast  sodium chloride 0.9%. 1000 milliLiter(s) (100 mL/Hr) IV Continuous <Continuous>  thiamine 100 milliGRAM(s) Oral daily    MEDICATIONS  (PRN):  acetaminophen     Tablet .. 650 milliGRAM(s) Oral every 6 hours PRN Temp greater or equal to 38C (100.4F), Mild Pain (1 - 3)  albuterol    90 MICROgram(s) HFA Inhaler 2 Puff(s) Inhalation every 6 hours PRN Shortness of Breath and/or Wheezing  aluminum hydroxide/magnesium hydroxide/simethicone Suspension 30 milliLiter(s) Oral every 4 hours PRN Dyspepsia  LORazepam   Injectable 2 milliGRAM(s) IV Push every 2 hours PRN CIWA-Ar score increase by 2 points and a total score of 7 or less  melatonin 3 milliGRAM(s) Oral at bedtime PRN Insomnia  ondansetron Injectable 4 milliGRAM(s) IV Push every 8 hours PRN Nausea and/or Vomiting

## 2022-11-21 VITALS — SYSTOLIC BLOOD PRESSURE: 126 MMHG | DIASTOLIC BLOOD PRESSURE: 76 MMHG | HEART RATE: 64 BPM

## 2022-11-21 LAB
ALBUMIN SERPL ELPH-MCNC: 4.2 G/DL — SIGNIFICANT CHANGE UP (ref 3.5–5.2)
ALP SERPL-CCNC: 63 U/L — SIGNIFICANT CHANGE UP (ref 30–115)
ALT FLD-CCNC: 20 U/L — SIGNIFICANT CHANGE UP (ref 0–41)
ANION GAP SERPL CALC-SCNC: 13 MMOL/L — SIGNIFICANT CHANGE UP (ref 7–14)
AST SERPL-CCNC: 37 U/L — SIGNIFICANT CHANGE UP (ref 0–41)
BASOPHILS # BLD AUTO: 0.08 K/UL — SIGNIFICANT CHANGE UP (ref 0–0.2)
BASOPHILS NFR BLD AUTO: 0.9 % — SIGNIFICANT CHANGE UP (ref 0–1)
BILIRUB SERPL-MCNC: 0.8 MG/DL — SIGNIFICANT CHANGE UP (ref 0.2–1.2)
BUN SERPL-MCNC: 6 MG/DL — LOW (ref 10–20)
CALCIUM SERPL-MCNC: 8.8 MG/DL — SIGNIFICANT CHANGE UP (ref 8.4–10.5)
CHLORIDE SERPL-SCNC: 102 MMOL/L — SIGNIFICANT CHANGE UP (ref 98–110)
CO2 SERPL-SCNC: 24 MMOL/L — SIGNIFICANT CHANGE UP (ref 17–32)
CREAT SERPL-MCNC: 0.6 MG/DL — LOW (ref 0.7–1.5)
CULTURE RESULTS: SIGNIFICANT CHANGE UP
EGFR: 137 ML/MIN/1.73M2 — SIGNIFICANT CHANGE UP
EOSINOPHIL # BLD AUTO: 0.22 K/UL — SIGNIFICANT CHANGE UP (ref 0–0.7)
EOSINOPHIL NFR BLD AUTO: 2.4 % — SIGNIFICANT CHANGE UP (ref 0–8)
GLUCOSE SERPL-MCNC: 70 MG/DL — SIGNIFICANT CHANGE UP (ref 70–99)
HCT VFR BLD CALC: 41.9 % — LOW (ref 42–52)
HGB BLD-MCNC: 15.5 G/DL — SIGNIFICANT CHANGE UP (ref 14–18)
IMM GRANULOCYTES NFR BLD AUTO: 0.6 % — HIGH (ref 0.1–0.3)
LYMPHOCYTES # BLD AUTO: 2.39 K/UL — SIGNIFICANT CHANGE UP (ref 1.2–3.4)
LYMPHOCYTES # BLD AUTO: 26.4 % — SIGNIFICANT CHANGE UP (ref 20.5–51.1)
MAGNESIUM SERPL-MCNC: 2 MG/DL — SIGNIFICANT CHANGE UP (ref 1.8–2.4)
MCHC RBC-ENTMCNC: 30.9 PG — SIGNIFICANT CHANGE UP (ref 27–31)
MCHC RBC-ENTMCNC: 37 G/DL — SIGNIFICANT CHANGE UP (ref 32–37)
MCV RBC AUTO: 83.5 FL — SIGNIFICANT CHANGE UP (ref 80–94)
MONOCYTES # BLD AUTO: 0.88 K/UL — HIGH (ref 0.1–0.6)
MONOCYTES NFR BLD AUTO: 9.7 % — HIGH (ref 1.7–9.3)
NEUTROPHILS # BLD AUTO: 5.44 K/UL — SIGNIFICANT CHANGE UP (ref 1.4–6.5)
NEUTROPHILS NFR BLD AUTO: 60 % — SIGNIFICANT CHANGE UP (ref 42.2–75.2)
NRBC # BLD: 0 /100 WBCS — SIGNIFICANT CHANGE UP (ref 0–0)
PLATELET # BLD AUTO: 358 K/UL — SIGNIFICANT CHANGE UP (ref 130–400)
POTASSIUM SERPL-MCNC: 3.5 MMOL/L — SIGNIFICANT CHANGE UP (ref 3.5–5)
POTASSIUM SERPL-SCNC: 3.5 MMOL/L — SIGNIFICANT CHANGE UP (ref 3.5–5)
PROT SERPL-MCNC: 6.1 G/DL — SIGNIFICANT CHANGE UP (ref 6–8)
RBC # BLD: 5.02 M/UL — SIGNIFICANT CHANGE UP (ref 4.7–6.1)
RBC # FLD: 12.8 % — SIGNIFICANT CHANGE UP (ref 11.5–14.5)
SODIUM SERPL-SCNC: 139 MMOL/L — SIGNIFICANT CHANGE UP (ref 135–146)
SPECIMEN SOURCE: SIGNIFICANT CHANGE UP
WBC # BLD: 9.06 K/UL — SIGNIFICANT CHANGE UP (ref 4.8–10.8)
WBC # FLD AUTO: 9.06 K/UL — SIGNIFICANT CHANGE UP (ref 4.8–10.8)

## 2022-11-21 PROCEDURE — 99221 1ST HOSP IP/OBS SF/LOW 40: CPT

## 2022-11-21 PROCEDURE — 99239 HOSP IP/OBS DSCHRG MGMT >30: CPT | Mod: GC

## 2022-11-21 PROCEDURE — 99253 IP/OBS CNSLTJ NEW/EST LOW 45: CPT

## 2022-11-21 RX ORDER — PREGABALIN 225 MG/1
1000 CAPSULE ORAL DAILY
Refills: 0 | Status: DISCONTINUED | OUTPATIENT
Start: 2022-11-21 | End: 2022-11-21

## 2022-11-21 RX ORDER — FOLIC ACID 0.8 MG
1 TABLET ORAL
Qty: 30 | Refills: 0
Start: 2022-11-21 | End: 2022-12-20

## 2022-11-21 RX ORDER — THIAMINE MONONITRATE (VIT B1) 100 MG
500 TABLET ORAL EVERY 8 HOURS
Refills: 0 | Status: DISCONTINUED | OUTPATIENT
Start: 2022-11-21 | End: 2022-11-21

## 2022-11-21 RX ORDER — GABAPENTIN 400 MG/1
1 CAPSULE ORAL
Qty: 90 | Refills: 0
Start: 2022-11-21 | End: 2022-12-20

## 2022-11-21 RX ORDER — ACAMPROSATE CALCIUM 333 MG/1
2 TABLET, DELAYED RELEASE ORAL
Qty: 180 | Refills: 0
Start: 2022-11-21 | End: 2022-12-20

## 2022-11-21 RX ORDER — FAMOTIDINE 10 MG/ML
20 INJECTION INTRAVENOUS ONCE
Refills: 0 | Status: COMPLETED | OUTPATIENT
Start: 2022-11-21 | End: 2022-11-21

## 2022-11-21 RX ORDER — NALTREXONE HYDROCHLORIDE 50 MG/1
50 TABLET, FILM COATED ORAL DAILY
Refills: 0 | Status: DISCONTINUED | OUTPATIENT
Start: 2022-11-21 | End: 2022-11-21

## 2022-11-21 RX ORDER — GABAPENTIN 400 MG/1
300 CAPSULE ORAL THREE TIMES A DAY
Refills: 0 | Status: DISCONTINUED | OUTPATIENT
Start: 2022-11-21 | End: 2022-11-21

## 2022-11-21 RX ORDER — THIAMINE MONONITRATE (VIT B1) 100 MG
1 TABLET ORAL
Qty: 30 | Refills: 0
Start: 2022-11-21 | End: 2022-12-20

## 2022-11-21 RX ORDER — NALTREXONE HYDROCHLORIDE 50 MG/1
1 TABLET, FILM COATED ORAL
Qty: 30 | Refills: 0
Start: 2022-11-21 | End: 2022-12-20

## 2022-11-21 RX ADMIN — Medication 105 MILLIGRAM(S): at 14:20

## 2022-11-21 RX ADMIN — SODIUM CHLORIDE 100 MILLILITER(S): 9 INJECTION INTRAMUSCULAR; INTRAVENOUS; SUBCUTANEOUS at 07:49

## 2022-11-21 RX ADMIN — GABAPENTIN 300 MILLIGRAM(S): 400 CAPSULE ORAL at 13:50

## 2022-11-21 RX ADMIN — Medication 20 MILLIGRAM(S): at 05:13

## 2022-11-21 RX ADMIN — Medication 1 TABLET(S): at 13:27

## 2022-11-21 RX ADMIN — PANTOPRAZOLE SODIUM 40 MILLIGRAM(S): 20 TABLET, DELAYED RELEASE ORAL at 07:45

## 2022-11-21 RX ADMIN — Medication 100 MILLIGRAM(S): at 13:27

## 2022-11-21 RX ADMIN — Medication 20 MILLIGRAM(S): at 02:25

## 2022-11-21 RX ADMIN — FAMOTIDINE 20 MILLIGRAM(S): 10 INJECTION INTRAVENOUS at 10:18

## 2022-11-21 RX ADMIN — Medication 1 MILLIGRAM(S): at 13:27

## 2022-11-21 RX ADMIN — Medication 30 MILLILITER(S): at 10:18

## 2022-11-21 NOTE — DISCHARGE NOTE NURSING/CASE MANAGEMENT/SOCIAL WORK - NSDCPETBCESMAN_GEN_ALL_CORE
Action    Action Taken 3/2/21:    -[facility] - they will start burning disc with imaging starting from 10/2020 - to most recent.     -[facility]: [id number]  2:12 PM         
Action 3/4/2021 10:50AM PARISA   Action Taken Received IMG disc from [facility]    3:11PM  I called pt Hernandez and he has imaging at the [facility]. He doesn't have imaging at [facility].            
If you are a smoker, it is important for your health to stop smoking. Please be aware that second hand smoke is also harmful.

## 2022-11-21 NOTE — DISCHARGE NOTE PROVIDER - CARE PROVIDER_API CALL
ANDRY YIP  Internal Medicine  N  THEODORA WILDE, Phys,    Phone: [phone]  Fax: [phone]  Follow Up Time: 1 week

## 2022-11-21 NOTE — DISCHARGE NOTE PROVIDER - HOSPITAL COURSE
27 year old male, past medical history alcohol use and asthma, who presents for shakiness. history goes back to age 19 when patient started drinking alcohol. patient drinks konyak whiskey. he used to drink moderately but recently he started drinking a bottle of whiskey per day attributed to going out with friends frequently to pubs. patient noticed that following each night out with his friends, he was having episodes of nausea and vomiting in the morning, hence, patient decided to stop alcohol completely 5 days ago. next day patient started complaining of tremor, vomiting, nausea, sweating, feeling of muscle tightness, headaches, buit denied hallucinations, tactile disturbances or confusion.    VITALS:   T(C): 37.1 (11-19-22 @ 15:57), Max: 37.1 (11-19-22 @ 15:57)  HR: 85 (11-19-22 @ 15:57) (68 - 85)  BP: 132/81 (11-19-22 @ 15:57) (128/82 - 132/81)  RR: 16 (11-19-22 @ 15:57) (16 - 16)  SpO2: 98% (11-19-22 @ 15:57) (98% - 98%)    in ED, vitals were WNLs. labs were significant for hypomagnesemia (1.6). patient received magnesium and admitted to telemetry for alcohol withdrawal management      #alcohol withdrawal  #h/o alcohol abuse  - drinks kony[place] whiskey 1 bottle/day -> started recently as patient is going frequently to pubs with his friends but patient started drinking alcohol at age 19  - patient was having nausea and  vomiting next morning after drinking alcohol  - decided to stop completely 5 days ago  - started complaining of tremor, N/V, headache, sweating  - unemployed, recently graduated highschool, no plans for college  - lives with mother and 2 sisters  - hemodynamically stable  - s/p magnesium in ED  - seen by addiction team  Per addiction team:  - recommend minimum ~3-4 day hospitalization to allow for full alcohol "detox"  - recommend to discontinue chlordiazepoxide taper and continue symptom-triggered alcohol withdrawal management with lorazepam 2 mg IV with change to q1h PRN per CIWA protocol for CIWA scores 8 or above (until patient no longer scoring 8 or above on CIWA for ~24 hours)  - recommend thiamine 500 mg IVP q8h x 9 doses (followed by thiamine 100 mg TID)  - recommend vitamin B12 1000 micrograms subcutaneous daily x 3 days  - continue multivitamin and folic acid  - recommend to start naltrexone 50 mg daily (on-label for AUD)- please discharge with 1-month prescription with [facility] f/u for prescription refill  - recommend to start gabapentin 300 mg TID (off-label for AUD)- please discharge with 1-month prescription with [facility] f/u for prescription refill  - recommend to discharge patient with acamprosate 666 mg TID (on-label for AUD) with 1-month prescription, with [facility] f/u for prescription refill  - recommend patient engage online recovery meetings including either AA/[employer]    #suspected magnesium deff  - repleted  - monitor    #suspected folate and thiamine deficiency  - c/w folate and thiamine supplementation    #lactemia- will trend 5.8-->2.35   PATIENT LEAVING AMA    27 year old male, past medical history alcohol use and asthma, who presents for shakiness. history goes back to age 19 when patient started drinking alcohol. patient drinks kony[place] whiskey. he used to drink moderately but recently he started drinking a bottle of whiskey per day attributed to going out with friends frequently to pubs. patient noticed that following each night out with his friends, he was having episodes of nausea and vomiting in the morning, hence, patient decided to stop alcohol completely 5 days ago. next day patient started complaining of tremor, vomiting, nausea, sweating, feeling of muscle tightness, headaches, buit denied hallucinations, tactile disturbances or confusion.    VITALS:   T(C): 37.1 (11-19-22 @ 15:57), Max: 37.1 (11-19-22 @ 15:57)  HR: 85 (11-19-22 @ 15:57) (68 - 85)  BP: 132/81 (11-19-22 @ 15:57) (128/82 - 132/81)  RR: 16 (11-19-22 @ 15:57) (16 - 16)  SpO2: 98% (11-19-22 @ 15:57) (98% - 98%)    in ED, vitals were WNLs. labs were significant for hypomagnesemia (1.6). patient received magnesium and admitted to telemetry for alcohol withdrawal management      #alcohol withdrawal  #h/o alcohol abuse  - drinks kony[place] whiskey 1 bottle/day -> started recently as patient is going frequently to pubs with his friends but patient started drinking alcohol at age 19  - patient was having nausea and  vomiting next morning after drinking alcohol  - decided to stop completely 5 days ago  - started complaining of tremor, N/V, headache, sweating  - unemployed, recently graduated highschool, no plans for college  - lives with mother and 2 sisters  - hemodynamically stable  - s/p magnesium in ED  - seen by addiction team  Per addiction team:  - recommend minimum ~3-4 day hospitalization to allow for full alcohol "detox"  - recommend to discontinue chlordiazepoxide taper and continue symptom-triggered alcohol withdrawal management with lorazepam 2 mg IV with change to q1h PRN per CIWA protocol for CIWA scores 8 or above (until patient no longer scoring 8 or above on CIWA for ~24 hours)  - recommend thiamine 500 mg IVP q8h x 9 doses (followed by thiamine 100 mg TID)  - recommend vitamin B12 1000 micrograms subcutaneous daily x 3 days  - continue multivitamin and folic acid  - recommend to start naltrexone 50 mg daily (on-label for AUD)- please discharge with 1-month prescription with [facility] f/u for prescription refill  - recommend to start gabapentin 300 mg TID (off-label for AUD)- please discharge with 1-month prescription with [facility] f/u for prescription refill  - recommend to discharge patient with acamprosate 666 mg TID (on-label for AUD) with 1-month prescription, with [facility] f/u for prescription refill  - recommend patient engage online recovery meetings including either [email or web address]    #suspected magnesium deff  - repleted  - monitor    #suspected folate and thiamine deficiency  - c/w folate and thiamine supplementation    #lactemia- will trend 5.8-->2.35   PATIENT LEAVING AMA    27 year old male, past medical history alcohol use and asthma, who presents for shakiness. history goes back to age 19 when patient started drinking alcohol. patient drinks kony[place] whiskey. he used to drink moderately but recently he started drinking a bottle of whiskey per day attributed to going out with friends frequently to pubs. patient noticed that following each night out with his friends, he was having episodes of nausea and vomiting in the morning, hence, patient decided to stop alcohol completely 5 days ago. next day patient started complaining of tremor, vomiting, nausea, sweating, feeling of muscle tightness, headaches, buit denied hallucinations, tactile disturbances or confusion.    VITALS:   T(C): 37.1 (11-19-22 @ 15:57), Max: 37.1 (11-19-22 @ 15:57)  HR: 85 (11-19-22 @ 15:57) (68 - 85)  BP: 132/81 (11-19-22 @ 15:57) (128/82 - 132/81)  RR: 16 (11-19-22 @ 15:57) (16 - 16)  SpO2: 98% (11-19-22 @ 15:57) (98% - 98%)    in ED, vitals were WNLs. labs were significant for hypomagnesemia (1.6). patient received magnesium and admitted to telemetry for alcohol withdrawal management      #alcohol withdrawal  #h/o alcohol abuse  - drinks k[place] whiskey 1 bottle/day -> started recently as patient is going frequently to pubs with his friends but patient started drinking alcohol at age 19  - patient was having nausea and  vomiting next morning after drinking alcohol  - decided to stop completely 5 days ago  - started complaining of tremor, N/V, headache, sweating  - unemployed, recently graduated highschool, no plans for college  - lives with mother and 2 sisters  - hemodynamically stable  - s/p magnesium in ED  - seen by addiction team  Per addiction team:  - recommend minimum ~3-4 day hospitalization to allow for full alcohol "detox"  - recommend to discontinue chlordiazepoxide taper and continue symptom-triggered alcohol withdrawal management with lorazepam 2 mg IV with change to q1h PRN per CIWA protocol for CIWA scores 8 or above (until patient no longer scoring 8 or above on CIWA for ~24 hours)  - recommend thiamine 500 mg IVP q8h x 9 doses (followed by thiamine 100 mg TID)  - recommend vitamin B12 1000 micrograms subcutaneous daily x 3 days  - continue multivitamin and folic acid  - recommend to start naltrexone 50 mg daily (on-label for AUD)- please discharge with 1-month prescription with [facility] f/u for prescription refill  - recommend to start gabapentin 300 mg TID (off-label for AUD)- please discharge with 1-month prescription with [facility] f/u for prescription refill  - recommend to discharge patient with acamprosate 666 mg TID (on-label for AUD) with 1-month prescription, with [facility] f/u for prescription refill  - recommend patient engage online recovery meetings including either AA/[email or web address]    #suspected magnesium deff  - repleted  - monitor    #suspected folate and thiamine deficiency  - c/w folate and thiamine supplementation    #lactemia- will trend 5.8-->2.35    Pt decided to leave [place]

## 2022-11-21 NOTE — DISCHARGE NOTE PROVIDER - NS AS DC PROVIDER CONTACT Y/N MULTI
Marco to initiate home care registered nurse work.  Marco with recommendations made by the nurse.   Yes

## 2022-11-21 NOTE — DISCHARGE NOTE PROVIDER - NSDCCPCAREPLAN_GEN_ALL_CORE_FT
PRINCIPAL DISCHARGE DIAGNOSIS  Diagnosis: Alcohol withdrawal  Assessment and Plan of Treatment: YOU ARE LEAVING THE HOSPITAL AGAINST MEDICAL ADVISE. You were admitted to the hospital for alcohol withdrawal. During your admission you were treated appropriately for your withdrawal symptoms. In addition, you were started on vitamin supplements. You were seen by the addiction medicine team. You were recommended to stay in the hospital and continue treatment. Please follow up outpatient with your primary care physician as well as the outpatient addiction team. Please use the resources available at "[email or web address]  Follow these instructions at home:   •Take over-the-counter and prescription medicines only as told by your doctor. This includes vitamins.  • Do not drink alcohol.  • Do not drive until your doctor says that this is safe for you.  •Have someone stay with you or be available in case you need help. This should be someone you trust. This person can help you with your symptoms. He or she can also help you to not drink.  •Drink enough fluid to keep your pee (urine) pale yellow.  •Think about joining a support group or a treatment program to help you stop drinking.  •Keep all follow-up visits as told by your doctor. This is important.  Contact a doctor if:  •Your symptoms get worse.  •You cannot eat or drink without throwing up.  •You have a hard time not drinking alcohol.  •You cannot stop drinking alcohol.  Get help right away if:  •You have fast or uneven heartbeats.  •You have chest pain.  •You have trouble breathing.  •You have a seizure for the first time.  •You see, hear, feel, smell, or taste something that is not there.  •You get very confused.      SECONDARY DISCHARGE DIAGNOSES  Diagnosis: Hypomagnesemia  Assessment and Plan of Treatment:

## 2022-11-21 NOTE — PROGRESS NOTE ADULT - SUBJECTIVE AND OBJECTIVE BOX
Pt seen and examined at bedside. No CP or SOB. complained of abd pain          PAST MEDICAL & SURGICAL HISTORY:  Asthma    History of appendectomy        VITAL SIGNS (Last 24 hrs):  T(C): 36.8 (11-21-22 @ 07:48), Max: 36.9 (11-21-22 @ 00:50)  HR: 64 (11-21-22 @ 12:43) (55 - 81)  BP: 126/76 (11-21-22 @ 12:43) (108/58 - 131/60)  RR: 18 (11-21-22 @ 07:48) (18 - 18)  SpO2: 99% (11-21-22 @ 07:48) (97% - 99%)  Wt(kg): --  Daily     Daily     I&O's Summary      PHYSICAL EXAM:  GENERAL: NAD, well-developed  HEAD:  Atraumatic, Normocephalic  EYES: EOMI, PERRLA, conjunctiva and sclera clear  NECK: Supple, No JVD  CHEST/LUNG: Clear to auscultation bilaterally; No wheeze  HEART: Regular rate and rhythm; No murmurs, rubs, or gallops  ABDOMEN: Soft, Nontender, Nondistended; Bowel sounds present  EXTREMITIES:  2+ Peripheral Pulses, No clubbing, cyanosis, or edema, tremor  PSYCH: AAOx3  NEUROLOGY: non-focal  SKIN: No rashes or lesions    Labs Reviewed  Spoke to patient in regards to abnormal labs.    CBC Full  -  ( 21 Nov 2022 05:52 )  WBC Count : 9.06 K/uL  Hemoglobin : 15.5 g/dL  Hematocrit : 41.9 %  Platelet Count - Automated : 358 K/uL  Mean Cell Volume : 83.5 fL  Mean Cell Hemoglobin : 30.9 pg  Mean Cell Hemoglobin Concentration : 37.0 g/dL  Auto Neutrophil # : 5.44 K/uL  Auto Lymphocyte # : 2.39 K/uL  Auto Monocyte # : 0.88 K/uL  Auto Eosinophil # : 0.22 K/uL  Auto Basophil # : 0.08 K/uL  Auto Neutrophil % : 60.0 %  Auto Lymphocyte % : 26.4 %  Auto Monocyte % : 9.7 %  Auto Eosinophil % : 2.4 %  Auto Basophil % : 0.9 %    BMP:    11-21 @ 05:52    Blood Urea Nitrogen - 6  Calcium - 8.8  Carbond Dioxide - 24  Chloride - 102  Creatinine - 0.6  Glucose - 70  Potassium - 3.5  Sodium - 139      Hemoglobin A1c -     Urine Culture:  11-19 @ 15:21 Urine culture: --    Culture Results:   <10,000 CFU/mL Normal Urogenital Mikayla  Method Type: --  Organism: --  Organism Identification: --  Specimen Source: Clean Catch Clean Catch (Midstream)        COVID Labs  CRP:      D-Dimer:            Imaging reviewed independently and reviewed read    < from: Xray Chest 1 View- PORTABLE-Urgent (Xray Chest 1 View- PORTABLE-Urgent .) (11.19.22 @ 16:17) >  Impression:  Low lung volume without definite evidence of focal consolidation pleural   effusion or pneumothorax.    < end of copied text >      MEDICATIONS  (STANDING):  cyanocobalamin Injectable 1000 MICROGram(s) SubCutaneous daily  folic acid 1 milliGRAM(s) Oral daily  gabapentin 300 milliGRAM(s) Oral three times a day  multivitamin 1 Tablet(s) Oral daily  naltrexone 50 milliGRAM(s) Oral daily  pantoprazole    Tablet 40 milliGRAM(s) Oral before breakfast  sodium chloride 0.9%. 1000 milliLiter(s) (100 mL/Hr) IV Continuous <Continuous>  thiamine IVPB 500 milliGRAM(s) IV Intermittent every 8 hours    MEDICATIONS  (PRN):  acetaminophen     Tablet .. 650 milliGRAM(s) Oral every 6 hours PRN Temp greater or equal to 38C (100.4F), Mild Pain (1 - 3)  albuterol    90 MICROgram(s) HFA Inhaler 2 Puff(s) Inhalation every 6 hours PRN Shortness of Breath and/or Wheezing  aluminum hydroxide/magnesium hydroxide/simethicone Suspension 30 milliLiter(s) Oral every 4 hours PRN Dyspepsia  LORazepam   Injectable 2 milliGRAM(s) IV Push every 1 hour PRN CIWA score of 8 or greater  melatonin 3 milliGRAM(s) Oral at bedtime PRN Insomnia  ondansetron Injectable 4 milliGRAM(s) IV Push every 8 hours PRN Nausea and/or Vomiting

## 2022-11-21 NOTE — DISCHARGE NOTE PROVIDER - NSDCMRMEDTOKEN_GEN_ALL_CORE_FT
albuterol 90 mcg/inh inhalation aerosol: 2 puff(s) inhaled every 6 hours, As Needed   acamprosate 333 mg oral delayed release tablet: 2 tab(s) orally 3 times a day   albuterol 90 mcg/inh inhalation aerosol: 2 puff(s) inhaled every 6 hours, As Needed  folic acid 1 mg oral tablet: 1 tab(s) orally once a day  gabapentin 300 mg oral capsule: 1 cap(s) orally 3 times a day  naltrexone 50 mg oral tablet: 1 tab(s) orally once a day  thiamine 100 mg oral tablet: 1 tab(s) orally once a day

## 2022-11-21 NOTE — DISCHARGE NOTE PROVIDER - ATTENDING DISCHARGE PHYSICAL EXAMINATION:
Attending attestation  Attending DC note  Pt seen and examined at bedside. No cp or sob.   vitals, labs, exam stable  Hospital course as above.  Plan dw pt and agreed to plan  pt left AMA. [facility] rec completed.  [facility] resident.   Patient is AAO x 3. The team explained at length to the patient the risks of signing out AMA including but not limited to harm, injury or death. The team explained the risks, benefits and alternatives to treatment as well as the attendant risks of refusing treatment at this time. We offered to answer any questions and fully answered any such questions. We believe that the patient fully understands what has been explained and answered. Patient signed form to sign out AMA and accepts responsibility for any and all results of this decision.

## 2022-11-21 NOTE — PROGRESS NOTE ADULT - TIME BILLING
time spent on review of labs, imaging studies, old records, obtaining history, personally examining patient, counselling and communicating with patient, entering orders for medications/tests/etc, discussions with other health care providers, documentation in electronic health records, independent interpretation of labs, imaging/procedure results and care coordination.
time spent on review of labs, imaging studies, old records, obtaining history, personally examining patient, counselling and communicating with patient, entering orders for medications/tests/etc, discussions with other health care providers, documentation in electronic health records, independent interpretation of labs, imaging/procedure results and care coordination.

## 2022-11-21 NOTE — DISCHARGE NOTE NURSING/CASE MANAGEMENT/SOCIAL WORK - PATIENT PORTAL LINK FT
You can access the FollowMyHealth Patient Portal offered by [facility] by registering at the following website: [email or web address]. By joining [facility]’s FollowMyHealth portal, you will also be able to view your health information using other applications (apps) compatible with our system.

## 2022-11-21 NOTE — CONSULT NOTE ADULT - SUBJECTIVE AND OBJECTIVE BOX
HPI:          NOTE IN PROGRESS, PLAN FINAL    Patient is a      Patient is ____ motivated to remain abstinent from _______ use and plans to engage supportive service recommendations, including follow-up for medications, IOP, connection to outpatient counseling, and referral to AA/12-step meetings.     Patient denies SI, HI, and auditory/visual hallucinations at this encounter.    Family History  Non-contributory except as described in HPI     Review of Systems  Negative except as described in HPI    Vitals    Vital Signs Last 24 Hrs  T(C): 36.8 (21 Nov 2022 07:48), Max: 36.9 (21 Nov 2022 00:50)  T(F): 98.3 (21 Nov 2022 07:48), Max: 98.4 (21 Nov 2022 00:50)  HR: 81 (21 Nov 2022 07:48) (55 - 81)  BP: 118/80 (21 Nov 2022 07:48) (108/58 - 131/60)  BP(mean): 95 (21 Nov 2022 07:48) (95 - 95)  RR: 18 (21 Nov 2022 07:48) (18 - 18)  SpO2: 99% (21 Nov 2022 07:48) (97% - 99%)    Parameters below as of 21 Nov 2022 07:48  Patient On (Oxygen Delivery Method): room air    Labs     AST 37  ALT 20  Cr 0.6    Mental Status Exam     Appearance: Patient is a male, good hygiene, AAOx4  Behavior: Appropriate during processing, calm, cooperative and respectful  Speech: Regular rate and rhythm, normal volume and normal tone  Mood: "doing better, I want to leave today"  Affect: Neutral/euthymic  Thought Process: Goal directed, linear and logical  Thought Content: No elicit delusions, obsessions, or phobias and denies suicidal and homicidal ideations  Perceptions: Not responding to any internal stimulus  Cognitive Functioning: Alert  Insight: Fair  Judgement: Fair  Reliability: Good    Recent and remote memory: Intact  Attention span and concentration: Intact  Language: Intact  Fund of knowledge: Intact    Gait/Station: Patient in bed at time of encounter, cannot assess  Strength: Normal strength in all extremities     -------------------------------------------------------     Plan:     1. Alcohol Use Disorder, Severe  Patient is motivated to engage treatment at this time. Maximum risk of seizures after last drink occurs 12-96 hours after last drink.    - recommend minimum ~3-4 day hospitalization to allow for full alcohol "detox", patient is currently in high-risk window ~48+ hours after last drink- however, patient would like to AMA and affirms understanding (1) that this writer strongly encourages him to stay in the hospital, (2) that he is likely feeling improved at this time 2/2 use of benzodiazepines, and (3) the risks, benefits, and consequences of AMA which can include return to alcohol use and/or injurious/fatal seizure    - recommend to discontinue chlordiazepoxide taper and continue symptom-triggered alcohol withdrawal management with lorazepam 2 mg PIV with change to q1h PRN per CIWA protocol for CIWA scores 8 or above (until patient no longer scoring 8 or above on CIWA for ~24 hours)    - recommend thiamine 500 mg IVP q8h x 9 doses (followed by thiamine 100 mg TID)    - recommend vitamin B12 1000 micrograms subcutaneous daily x 3 days    - continue multivitamin and folic acid    - recommend to start naltrexone 50 mg daily (on-label for AUD)- please discharge with 1-month prescription with [facility] for prescription refill    - recommend to start gabapentin 300 mg TID (off-label for AUD)- please discharge with 1-month prescription with [facility] f/[facility] for prescription refill    - recommend to discharge patient with acamprosate 666 mg TID (on-label for AUD) with 1-month prescription, with [facility] f/[facility] for prescription refill    - recommend patient engage online recovery meetings including either AA/[email or web address]    - for coordination with C[facility] team SW to provide referral/resources for psychosocial services          -----        Alma Delia Escobedo MD, MS, MPH  Addiction Medicine & Preventive Medicine/Public Health Physician  Please contact by [employer] Teams at any time for questions/concerns.    Greater than 50% of time spent in face-to-face interaction with patient and/or family and/or coordination of care: 55 minutes HPI:     Patient is a 28 y/o male with alcohol use disorder admitted 11/19/22 for alcohol withdrawal management.     Patient reports that he has been drinking since age ~19, but that use did not escalate to problematic levels until the past ~1-2 years. Reports that he has been drinking 1 large bottle of cognac/whiskey daily, often when going out with friends. Patient affirms understanding that this is a high level of alcohol use and that he is at high-risk to develop long-term medical complications from alcohol use disorder if he does not significantly reduce/stop use at this time. He denies history of seizures from alcohol use/cessation and denies history of outpatient/inpatient treatment for alcohol use disorder, although he reports previous hospitalizations related to alcohol use. He reports that he has felt stressed because he wants to be an  and go to college, but that he cannot read past a 5th grade reading level. He states that he was able to continue passing with B averages throughout his schooling and was even accepted to college based on his grades, but that his learning deficit- which may extend beyond reading into other subjects- is significantly impairing his ability to accomplish life goals. He notes that he has an aunt who is able to assist him in finding an individual  to assist with learning deficits in order to help prepare him for college, and prepares to work with her towards this end upon discharge. He is amenable to receive medications for alcohol use disorder including naltrexone, acamprosate, and gabapentin and accepts recommendation to trial recovery meetings and to consider outpatient/inpatient treatment in the future as-needed.     Patient is motivated to remain abstinent from alcohol use and plans to engage some supportive service recommendations. However, he reports desire to leave hospital today. Patient affirms understanding of risks, benefits, and consequences of leaving hospital today which could include relapse to alcohol use and/or injurious/fatal seizure due to incomplete detox (currently at ~48+ hours from last use with possibility for seizures to occur ~12 hours to ~7 days after last alcoholic drink). Patient affirms understanding that he is likely feeling better at this time due to having received standing chlordiazepoxide, and that he is likely to develop anxiety/tremulousness again soon if he were to AMA today. Patient affirms understanding that it is this writer's strong recommendation that he does not leave the hospital today due to the risks listed above. Patient appears to lack insight into severity of his condition at this time due to strong desire to AMA at this time despite strong recommendation by this writer against it, but has capacity to make decision to AMA based on this evaluation.     Patient denies SI, HI, and auditory/visual hallucinations at this encounter.    Family History  Non-contributory except as described in HPI     Review of Systems  Negative except as described in HPI    Vitals    Vital Signs Last 24 Hrs  T(C): 36.8 (21 Nov 2022 07:48), Max: 36.9 (21 Nov 2022 00:50)  T(F): 98.3 (21 Nov 2022 07:48), Max: 98.4 (21 Nov 2022 00:50)  HR: 81 (21 Nov 2022 07:48) (55 - 81)  BP: 118/80 (21 Nov 2022 07:48) (108/58 - 131/60)  BP(mean): 95 (21 Nov 2022 07:48) (95 - 95)  RR: 18 (21 Nov 2022 07:48) (18 - 18)  SpO2: 99% (21 Nov 2022 07:48) (97% - 99%)    Parameters below as of 21 Nov 2022 07:48  Patient On (Oxygen Delivery Method): room air    Labs     AST 37  ALT 20  Cr 0.6    Mental Status Exam     Appearance: Patient is a male, good hygiene, AAOx4  Behavior: Appropriate during processing, calm, cooperative and respectful  Speech: Regular rate and rhythm, normal volume and normal tone  Mood: "doing better, I want to leave today"  Affect: Neutral/euthymic  Thought Process: Goal directed, linear and logical  Thought Content: No elicit delusions, obsessions, or phobias and denies suicidal and homicidal ideations  Perceptions: Not responding to any internal stimulus  Cognitive Functioning: Alert  Insight: Fair  Judgement: Fair  Reliability: Good    Recent and remote memory: Intact  Attention span and concentration: Intact  Language: Intact  Fund of knowledge: Intact    Gait/Station: Patient in bed at time of encounter, cannot assess  Strength: Normal strength in all extremities     -------------------------------------------------------     Plan:     1. Alcohol Use Disorder, Severe  Patient is motivated to engage treatment via medications for alcohol use disorder at this time. Maximum risk of seizures after last drink occurs 12-96 hours after last drink, but can occur up to ~7+ days after.    - recommend minimum ~3-4 day hospitalization to allow for full alcohol "detox", patient is currently in high-risk window for seizure/DTs (~48+ hours after last drink)- however, he would like to AMA and affirms understanding (1) that this writer strongly recommends for him to stay in the hospital due to reasons listed in HPI, (2) that he is likely feeling improved at this evaluation 2/2 use of standing benzodiazepines which he will not receive on discharge, and (3) the risks, benefits, and consequences of AMA which can include return to alcohol use and/or injurious/fatal seizure- patient has capacity based on this evaluation to AMA despite this writer's strong encouragement against it    - recommend to discontinue chlordiazepoxide taper and continue symptom-triggered alcohol withdrawal management with lorazepam 2 mg IV with change to q1h PRN per CIWA protocol for CIWA scores 8 or above (until patient no longer scoring 8 or above on CIWA for ~24 hours)    - recommend thiamine 500 mg IVP q8h x 9 doses (followed by thiamine 100 mg TID)    - recommend vitamin B12 1000 micrograms subcutaneous daily x 3 days    - continue multivitamin and folic acid    - recommend to start naltrexone 50 mg daily (on-label for AUD)- please discharge with 1-month prescription with [facility] for prescription refill    - recommend to start gabapentin 300 mg TID (off-label for AUD)- please discharge with 1-month prescription with [facility] for prescription refill    - recommend to discharge patient with acamprosate 666 mg TID (on-label for AUD) with 1-month prescription, with [facility] f[facility] for prescription refill    - recommend patient engage online recovery meetings including either [email or web address]    - for coordination with [facility] team SW to provide referral/resources for psychosocial services          -----        Alma Delia Escobedo MD, MS, MPH  Addiction Medicine & Preventive Medicine/Public Health Physician  Please contact by [employer] Teams at any time for questions/concerns.    Greater than 50% of time spent in face-to-face interaction with patient and/or family and/or coordination of care: 55 minutes HPI:     Patient is a 28 y/o male with alcohol use disorder admitted 11/19/22 for alcohol withdrawal management.     Patient reports that he has been drinking since age ~19, but that use did not escalate to problematic levels until the past ~1-2 years. Reports that he has been drinking 1 large bottle of cognac/whiskey daily, often when going out with friends. Patient affirms understanding that this is a high level of alcohol use and that he is at high-risk to develop long-term medical complications from alcohol use disorder if he does not significantly reduce/stop use at this time. He denies history of seizures from alcohol use/cessation and denies history of outpatient/inpatient treatment for alcohol use disorder, although he reports previous hospitalizations related to alcohol use. He denies other substance use. He reports that he has felt stressed because he wants to be an  and go to college, but that he cannot read past a 5th grade reading level. He states that he was able to continue passing with B averages throughout his schooling and was even accepted to college based on his grades, but that his learning deficit- which may extend beyond reading into other subjects- is significantly impairing his ability to accomplish life goals. He notes that he has an aunt who is able to assist him in finding an individual  to assist with learning deficits in order to help prepare him for college, and prepares to work with her towards this end upon discharge. He is amenable to receive medications for alcohol use disorder including naltrexone, acamprosate, and gabapentin and accepts recommendation to trial recovery meetings and to consider outpatient/inpatient treatment in the future as-needed.     Patient is motivated to remain abstinent from alcohol use and plans to engage some supportive service recommendations. However, he reports desire to leave hospital today. Patient affirms understanding of risks, benefits, and consequences of leaving hospital today which could include relapse to alcohol use and/or injurious/fatal seizure due to incomplete detox (currently at ~48+ hours from last use with possibility for seizures to occur ~12 hours to ~7 days after last alcoholic drink). Patient affirms understanding that he is likely feeling better at this time due to having received standing chlordiazepoxide, and that he is likely to develop anxiety/tremulousness again soon if he were to AMA today. Patient affirms understanding that it is this writer's strong recommendation that he does not leave the hospital today due to the risks listed above. Patient appears to lack insight into severity of his condition at this time due to strong desire to AMA at this time despite strong recommendation by this writer against it, but has capacity to make decision to AMA based on this evaluation.     Patient denies SI, HI, and auditory/visual hallucinations at this encounter.    Family History  Non-contributory except as described in HPI     Review of Systems  Negative except as described in HPI    Vitals    Vital Signs Last 24 Hrs  T(C): 36.8 (21 Nov 2022 07:48), Max: 36.9 (21 Nov 2022 00:50)  T(F): 98.3 (21 Nov 2022 07:48), Max: 98.4 (21 Nov 2022 00:50)  HR: 81 (21 Nov 2022 07:48) (55 - 81)  BP: 118/80 (21 Nov 2022 07:48) (108/58 - 131/60)  BP(mean): 95 (21 Nov 2022 07:48) (95 - 95)  RR: 18 (21 Nov 2022 07:48) (18 - 18)  SpO2: 99% (21 Nov 2022 07:48) (97% - 99%)    Parameters below as of 21 Nov 2022 07:48  Patient On (Oxygen Delivery Method): room air    Labs     AST 37  ALT 20  Cr 0.6    Mental Status Exam     Appearance: Patient is a male, good hygiene, AAOx4  Behavior: Appropriate during processing, calm, cooperative and respectful  Speech: Regular rate and rhythm, normal volume and normal tone  Mood: "doing better, I want to leave today"  Affect: Neutral/euthymic  Thought Process: Goal directed, linear and logical  Thought Content: No elicit delusions, obsessions, or phobias and denies suicidal and homicidal ideations  Perceptions: Not responding to any internal stimulus  Cognitive Functioning: Alert  Insight: Fair  Judgement: Fair  Reliability: Good    Recent and remote memory: Intact  Attention span and concentration: Intact  Language: Intact  Fund of knowledge: Intact    Gait/Station: Patient in bed at time of encounter, cannot assess  Strength: Normal strength in all extremities     -------------------------------------------------------     Plan:     1. Alcohol Use Disorder, Severe  Patient is motivated to engage treatment via medications for alcohol use disorder at this time. Maximum risk of seizures after last drink occurs 12-96 hours after last drink, but can occur up to ~7+ days after.    - recommend minimum ~3-4 day hospitalization to allow for full alcohol "detox", patient is currently in high-risk window for seizure/DTs (~48+ hours after last drink)- however, he would like to AMA and affirms understanding (1) that this writer strongly recommends for him to stay in the hospital due to reasons listed in HPI, (2) that he is likely feeling improved at this evaluation 2/2 use of standing benzodiazepines which he will not receive on discharge, and (3) the risks, benefits, and consequences of AMA which can include return to alcohol use and/or injurious/fatal seizure- patient has capacity based on this evaluation to AMA despite this writer's strong encouragement against it    - recommend to discontinue chlordiazepoxide taper and continue symptom-triggered alcohol withdrawal management with lorazepam 2 mg IV with change to q1h PRN per CIWA protocol for CIWA scores 8 or above (until patient no longer scoring 8 or above on CIWA for ~24 hours)    - recommend thiamine 500 mg IVP q8h x 9 doses (followed by thiamine 100 mg TID)    - recommend vitamin B12 1000 micrograms subcutaneous daily x 3 days    - continue multivitamin and folic acid    - recommend to start naltrexone 50 mg daily (on-label for AUD)- please discharge with 1-month prescription with [facility] for prescription refill    - recommend to start gabapentin 300 mg TID (off-label for AUD)- please discharge with 1-month prescription with [facility] f[facility] for prescription refill    - recommend to discharge patient with acamprosate 666 mg TID (on-label for AUD) with 1-month prescription, with [facility] f/[facility] for prescription refill    - recommend patient engage online recovery meetings including either AA/[employer]    - for coordination with [facility] team SW to provide referral/resources for psychosocial services          -----        Alma Delia Escobedo MD, MS, MPH  Addiction Medicine & Preventive Medicine/Public Health Physician  Please contact by [employer] Teams at any time for questions/concerns.    Greater than 50% of time spent in face-to-face interaction with patient and/or family and/or coordination of care: 55 minutes HPI:     Patient is a 26 y/o male with alcohol use disorder admitted 11/19/22 for alcohol withdrawal management.     Patient reports that he has been drinking since age ~19, but that use did not escalate to problematic levels until the past ~1-2 years. Reports that he has been drinking 1 large bottle of cognac daily, often when going out with friends. Patient affirms understanding that this is a high level of alcohol use and that he is at high-risk to develop long-term medical complications from alcohol use disorder if he does not significantly reduce/stop use at this time. He denies history of seizures from alcohol use/cessation and denies history of outpatient/inpatient treatment for alcohol use disorder, although he reports previous hospitalizations related to alcohol use. He denies other substance use. He reports that he has felt stressed because he wants to be an  and go to college, but that he cannot read past a 5th grade reading level. He states that he was able to continue passing with B averages throughout his schooling and was even accepted to college based on his grades, but that his learning deficit- which may extend beyond reading into other subjects- is significantly impairing his ability to accomplish life goals. He notes that he has an aunt who is able to assist him in finding an individual  to assist with learning deficits in order to help prepare him for college, and prepares to work with her towards this end upon discharge. He is amenable to receive medications for alcohol use disorder including naltrexone, acamprosate, and gabapentin and accepts recommendation to trial recovery meetings and to consider outpatient/inpatient treatment in the future as-needed.     Patient is motivated to remain abstinent from alcohol use and plans to engage some supportive service recommendations. However, he reports desire to leave hospital today. Patient affirms understanding of risks, benefits, and consequences of leaving hospital today which could include relapse to alcohol use and/or injurious/fatal seizure due to incomplete detox (currently at ~48+ hours from last use with possibility for seizures to occur ~12 hours to ~7 days after last alcoholic drink). Patient affirms understanding that he is likely feeling better at this time due to having received standing chlordiazepoxide, and that he is likely to develop anxiety/tremulousness again soon if he were to AMA today. Patient affirms understanding that it is this writer's strong recommendation that he does not leave the hospital today due to the risks listed above. Patient appears to lack insight into severity of his condition at this time due to strong desire to AMA at this time despite strong recommendation by this writer against it, but has capacity to make decision to AMA based on this evaluation.     Patient denies SI, HI, and auditory/visual hallucinations at this encounter.    Family History  Non-contributory except as described in HPI     Review of Systems  Negative except as described in HPI    Vitals    Vital Signs Last 24 Hrs  T(C): 36.8 (21 Nov 2022 07:48), Max: 36.9 (21 Nov 2022 00:50)  T(F): 98.3 (21 Nov 2022 07:48), Max: 98.4 (21 Nov 2022 00:50)  HR: 81 (21 Nov 2022 07:48) (55 - 81)  BP: 118/80 (21 Nov 2022 07:48) (108/58 - 131/60)  BP(mean): 95 (21 Nov 2022 07:48) (95 - 95)  RR: 18 (21 Nov 2022 07:48) (18 - 18)  SpO2: 99% (21 Nov 2022 07:48) (97% - 99%)    Parameters below as of 21 Nov 2022 07:48  Patient On (Oxygen Delivery Method): room air    Labs     AST 37  ALT 20  Cr 0.6    Mental Status Exam     Appearance: Patient is a male, good hygiene, AAOx4  Behavior: Appropriate during processing, calm, cooperative and respectful  Speech: Regular rate and rhythm, normal volume and normal tone  Mood: "doing better, I want to leave today"  Affect: Neutral/euthymic  Thought Process: Goal directed, linear and logical  Thought Content: No elicit delusions, obsessions, or phobias and denies suicidal and homicidal ideations  Perceptions: Not responding to any internal stimulus  Cognitive Functioning: Alert  Insight: Fair  Judgement: Fair  Reliability: Good    Recent and remote memory: Intact  Attention span and concentration: Intact  Language: Intact  Fund of knowledge: Intact    Gait/Station: Patient in bed at time of encounter, cannot assess  Strength: Normal strength in all extremities     -------------------------------------------------------     Plan:     1. Alcohol Use Disorder, Severe  Patient is motivated to engage treatment via medications for alcohol use disorder at this time. Maximum risk of seizures after last drink occurs 12-96 hours after last drink, but can occur up to ~7+ days after.    - recommend minimum ~3-4 day hospitalization to allow for full alcohol "detox", patient is currently in high-risk window for seizure/DTs (~48+ hours after last drink)- however, he would like to AMA and affirms understanding (1) that this writer strongly recommends for him to stay in the hospital due to reasons listed in HPI, (2) that he is likely feeling improved at this evaluation 2/2 use of standing benzodiazepines which he will not receive on discharge, and (3) the risks, benefits, and consequences of AMA which can include return to alcohol use and/or injurious/fatal seizure- patient has capacity based on this evaluation to AMA despite this writer's strong encouragement against it    - recommend to discontinue chlordiazepoxide taper and continue symptom-triggered alcohol withdrawal management with lorazepam 2 mg IV with change to q1h PRN per CIWA protocol for CIWA scores 8 or above (until patient no longer scoring 8 or above on CIWA for ~24 hours)    - recommend thiamine 500 mg IVP q8h x 9 doses (followed by thiamine 100 mg TID)    - recommend vitamin B12 1000 micrograms subcutaneous daily x 3 days    - continue multivitamin and folic acid    - recommend to start naltrexone 50 mg daily (on-label for AUD)- please discharge with 1-month prescription with [facility] for prescription refill    - recommend to start gabapentin 300 mg TID (off-label for AUD)- please discharge with 1-month prescription with [facility] f/[facility] for prescription refill    - recommend to discharge patient with acamprosate 666 mg TID (on-label for AUD) with 1-month prescription, with [facility] f/u for prescription refill    - recommend patient engage online recovery meetings including either AA/[employer]    - for coordination with CATCH team SW to provide referral/resources for psychosocial services          -----        Alma Delia Escobedo MD, MS, MPH  Addiction Medicine & Preventive Medicine/Public Health Physician  Please contact by Microsoft Teams at any time for questions/concerns.    Greater than 50% of time spent in face-to-face interaction with patient and/or family and/or coordination of care: 55 minutes HPI:     Patient is a 26 y/o male with alcohol use disorder admitted 11/19/22 for alcohol withdrawal management.     Patient reports that he has been drinking since age ~19, but that use did not escalate to problematic levels until the past ~1-2 years. Reports that he has been drinking 1 large bottle of cognac daily, often when going out with friends. Reports trying to stop drinking ~4-5 days prior to admission and then developing intractable tremulousness, with patient still with mild tremulousness on exam at this encounter (with standing chlordiazepoxide ordered). Patient affirms understanding that this is a high level of alcohol use and that he is at high-risk to develop long-term medical complications from alcohol use disorder if he does not significantly reduce/stop use at this time. He denies history of seizures from alcohol use/cessation and denies history of outpatient/inpatient treatment for alcohol use disorder, although he reports previous hospitalizations related to alcohol use. He denies other substance use. He reports that he has felt stressed because he wants to be an  and go to college, but that he cannot read past a 5th grade reading level. He states that he was able to continue passing with B averages throughout his schooling and was even accepted to college based on his grades, but that his learning deficit- which may extend beyond reading into other subjects- is significantly impairing his ability to accomplish life goals. He notes that he has an aunt who is able to assist him in finding an individual  to assist with learning deficits in order to help prepare him for college, and prepares to work with her towards this end upon discharge. He is amenable to receive medications for alcohol use disorder including naltrexone, acamprosate, and gabapentin and accepts recommendation to trial recovery meetings and to consider outpatient/inpatient treatment in the future as-needed.     Patient is motivated to remain abstinent from alcohol use and plans to engage some supportive service recommendations. However, he reports desire to leave hospital today. Patient affirms understanding of risks, benefits, and consequences of leaving hospital today which could include relapse to alcohol use and/or injurious/fatal seizure due to incomplete detox (currently at ~48+ hours from last use with possibility for seizures to occur ~12 hours to ~7 days after last alcoholic drink). Patient affirms understanding that he is likely feeling better at this time due to having received standing chlordiazepoxide, and that he is likely to develop anxiety/tremulousness again soon if he were to AMA today. Patient affirms understanding that it is this writer's strong recommendation that he does not leave the hospital today due to the risks listed above. Patient appears to lack insight into severity of his condition at this time due to strong desire to AMA at this time despite strong recommendation by this writer against it, but has capacity to make decision to AMA based on this evaluation.     Patient denies SI, HI, and auditory/visual hallucinations at this encounter.    Family History  Non-contributory except as described in HPI     Review of Systems  Negative except as described in HPI    Vitals    Vital Signs Last 24 Hrs  T(C): 36.8 (21 Nov 2022 07:48), Max: 36.9 (21 Nov 2022 00:50)  T(F): 98.3 (21 Nov 2022 07:48), Max: 98.4 (21 Nov 2022 00:50)  HR: 81 (21 Nov 2022 07:48) (55 - 81)  BP: 118/80 (21 Nov 2022 07:48) (108/58 - 131/60)  BP(mean): 95 (21 Nov 2022 07:48) (95 - 95)  RR: 18 (21 Nov 2022 07:48) (18 - 18)  SpO2: 99% (21 Nov 2022 07:48) (97% - 99%)    Parameters below as of 21 Nov 2022 07:48  Patient On (Oxygen Delivery Method): room air    Labs     AST 37  ALT 20  Cr 0.6    Mental Status Exam     Appearance: Patient is a male, good hygiene, AAOx4, CIWA 3  Behavior: Appropriate during processing, calm, cooperative and respectful  Speech: Regular rate and rhythm, normal volume and normal tone  Mood: "doing better, I want to leave today"  Affect: Neutral/euthymic  Thought Process: Goal directed, linear and logical  Thought Content: No elicit delusions, obsessions, or phobias and denies suicidal and homicidal ideations  Perceptions: Not responding to any internal stimulus  Cognitive Functioning: Alert  Insight: Fair  Judgement: Fair  Reliability: Good    Recent and remote memory: Intact  Attention span and concentration: Intact  Language: Intact  Fund of knowledge: Intact    Gait/Station: Patient in bed at time of encounter, cannot assess  Strength: Normal strength in all extremities     -------------------------------------------------------     Plan:     1. Alcohol Use Disorder, Severe  Patient is motivated to engage treatment via medications for alcohol use disorder at this time. Maximum risk of seizures after last drink occurs 12-96 hours after last drink, but can occur up to ~7+ days after.    - recommend minimum ~3-4 day hospitalization to allow for full alcohol "detox", patient is currently in high-risk window for seizure/DTs- however, he would like to AMA and affirms understanding (1) that this writer strongly recommends for him to stay in the hospital due to reasons listed in HPI, (2) that he is likely feeling improved at this evaluation 2/2 use of standing benzodiazepines which he will not receive on discharge, and (3) the risks, benefits, and consequences of AMA which can include return to alcohol use and/or injurious/fatal seizure- patient has capacity based on this evaluation to AMA despite this writer's strong encouragement against it    - recommend to discontinue chlordiazepoxide taper and continue symptom-triggered alcohol withdrawal management with lorazepam 2 mg IV with change to q1h PRN per CIWA protocol for CIWA scores 8 or above (until patient no longer scoring 8 or above on CIWA for ~24 hours)    - recommend thiamine 500 mg IVP q8h x 9 doses (followed by thiamine 100 mg TID)    - recommend vitamin B12 1000 micrograms subcutaneous daily x 3 days    - continue multivitamin and folic acid    - recommend to start naltrexone 50 mg daily (on-label for AUD)- please discharge with 1-month prescription with [facility] f/u for prescription refill    - recommend to start gabapentin 300 mg TID (off-label for AUD)- please discharge with 1-month prescription with [facility] f/u for prescription refill    - recommend to discharge patient with acamprosate 666 mg TID (on-label for AUD) with 1-month prescription, with [facility] f/u for prescription refill    - recommend patient engage online recovery meetings including either AA/[id number]    - for coordination with CATCH team SW to provide referral/resources for psychosocial services          -----        Alma Delia Escobedo MD, MS, MPH  Addiction Medicine & Preventive Medicine/Public Health Physician  Please contact by [employer] Teams at any time for questions/concerns.    Greater than 50% of time spent in face-to-face interaction with patient and/or family and/or coordination of care: 55 minutes HPI:     Patient is a 28 y/o male with alcohol use disorder admitted 11/19/22 for alcohol withdrawal management.     Patient reports that he has been drinking since age ~19, but that use did not escalate to problematic levels until the past ~1-2 years. Reports that he has been drinking 1 large bottle of cognac daily, often when going out with friends. Reports trying to stop drinking ~4-5 days prior to admission and then developing intractable tremulousness leading to hospitalization, with patient still with mild tremulousness on exam at this encounter (with standing chlordiazepoxide taper ordered). Patient affirms understanding that this is a high level of alcohol use and that he is at high-risk to develop long-term medical complications from alcohol use disorder if he does not significantly reduce/stop use at this time. He denies history of seizures from alcohol use/cessation and denies history of outpatient/inpatient treatment for alcohol use disorder, although he reports previous hospitalizations related to alcohol use. He denies other substance use. He reports that he has felt stressed because he wants to be an  and go to college, but that he cannot read past a 5th grade reading level. He states that he was able to continue passing with B averages throughout his schooling and was even accepted to college based on his grades, but that his learning deficit- which may extend beyond reading into other subjects- is significantly impairing his ability to accomplish life goals. He notes that he has an aunt who is able to assist him in finding an individual  to assist with learning deficits in order to help prepare him for college, and prepares to work with her towards this end upon discharge. He is amenable to receive medications for alcohol use disorder including naltrexone, acamprosate, and gabapentin and accepts recommendation to trial recovery meetings and to consider outpatient/inpatient treatment in the future as-needed.     Patient is motivated to remain abstinent from alcohol use and plans to engage some supportive service recommendations. However, he reports desire to leave hospital today. Patient affirms understanding of risks, benefits, and consequences of leaving hospital today which could include relapse to alcohol use and/or injurious/fatal seizure due to incomplete detox (currently at ~48+ hours from last use with possibility for seizures to occur ~12 hours to ~7 days after last alcoholic drink). Patient affirms understanding that he is likely feeling better at this time due to having received standing chlordiazepoxide, and that he is likely to develop anxiety/tremulousness again soon if he were to AMA today. Patient affirms understanding that it is this writer's strong recommendation that he does not leave the hospital today due to the risks listed above. Patient appears to lack insight into severity of his condition at this time due to strong desire to AMA at this time despite strong recommendation by this writer against it, but has capacity to make decision to AMA based on this evaluation.     Patient denies SI, HI, and auditory/visual hallucinations at this encounter.    Family History  Non-contributory except as described in HPI     Review of Systems  Negative except as described in HPI    Vitals    Vital Signs Last 24 Hrs  T(C): 36.8 (21 Nov 2022 07:48), Max: 36.9 (21 Nov 2022 00:50)  T(F): 98.3 (21 Nov 2022 07:48), Max: 98.4 (21 Nov 2022 00:50)  HR: 81 (21 Nov 2022 07:48) (55 - 81)  BP: 118/80 (21 Nov 2022 07:48) (108/58 - 131/60)  BP(mean): 95 (21 Nov 2022 07:48) (95 - 95)  RR: 18 (21 Nov 2022 07:48) (18 - 18)  SpO2: 99% (21 Nov 2022 07:48) (97% - 99%)    Parameters below as of 21 Nov 2022 07:48  Patient On (Oxygen Delivery Method): room air    Labs     AST 37  ALT 20  Cr 0.6    Mental Status Exam     Appearance: Patient is a male, good hygiene, AAOx4, CIWA 3  Behavior: Appropriate during processing, calm, cooperative and respectful  Speech: Regular rate and rhythm, normal volume and normal tone  Mood: "doing better, I want to leave today"  Affect: Neutral/euthymic  Thought Process: Goal directed, linear and logical  Thought Content: No elicit delusions, obsessions, or phobias and denies suicidal and homicidal ideations  Perceptions: Not responding to any internal stimulus  Cognitive Functioning: Alert  Insight: Fair  Judgement: Fair  Reliability: Good    Recent and remote memory: Intact  Attention span and concentration: Intact  Language: Intact  Fund of knowledge: Intact    Gait/Station: Patient in bed at time of encounter, cannot assess  Strength: Normal strength in all extremities     -------------------------------------------------------     Plan:     1. Alcohol Use Disorder, Severe  Patient is motivated to engage treatment via medications for alcohol use disorder at this time. Maximum risk of seizures after last drink occurs 12-96 hours after last drink, but can occur up to ~7+ days after.    - recommend minimum ~3-4 day hospitalization to allow for full alcohol "detox", patient is exiting but still within high-risk window for seizure/DTs- however, he would like to AMA and affirms understanding (1) that this writer recommends for him to stay in the hospital due to reasons listed in HPI, (2) that he is likely feeling improved at this evaluation 2/2 use of standing benzodiazepines which he will not receive on discharge, and (3) the risks, benefits, and consequences of AMA which can include return to alcohol use and/or injurious/fatal seizure- patient has capacity based on this evaluation to AMA despite this writer's encouragement against it until benzodiazepines are not required to manage symptoms for ~24 hours and tremulousness is fully resolved    - recommend to discontinue chlordiazepoxide taper and continue symptom-triggered alcohol withdrawal management with lorazepam 2 mg IV with change to q1h PRN per CIWA protocol for CIWA scores 8 or above (until patient no longer scoring 8 or above on CIWA for ~24 hours)    - recommend thiamine 500 mg IVP q8h x 9 doses (followed by thiamine 100 mg TID)    - recommend vitamin B12 1000 micrograms subcutaneous daily x 3 days    - continue multivitamin and folic acid    - recommend to start naltrexone 50 mg daily (on-label for AUD)- please discharge with 1-month prescription with [facility] f/u for prescription refill    - recommend to start gabapentin 300 mg TID (off-label for AUD)- please discharge with 1-month prescription with [facility] f/u for prescription refill    - recommend to discharge patient with acamprosate 666 mg TID (on-label for AUD) with 1-month prescription, with [facility] f/u for prescription refill    - recommend patient engage online recovery meetings including either [employer]    - for coordination with CATCH team SW to provide referral/resources for psychosocial services          -----        Alma Delia Escobedo MD, MS, MPH  Addiction Medicine & Preventive Medicine/Public Health Physician  Please contact by [employer] Teams at any time for questions/concerns.    Greater than 50% of time spent in face-to-face interaction with patient and/or family and/or coordination of care: 55 minutes

## 2022-11-21 NOTE — DISCHARGE NOTE NURSING/CASE MANAGEMENT/SOCIAL WORK - NSDCPEFALRISK_GEN_ALL_CORE
For information on Fall & Injury Prevention, visit: [email or web address] OR  [email or web address] OR  https://www.cdc.gov/steadi/patient.html

## 2022-11-21 NOTE — PROGRESS NOTE ADULT - ASSESSMENT
27 year old male, past medical history alcohol use, who presents for tremor, sweating, N/V after stopping alcohol 5 days ago    #alcohol withdrawal  #h/o alcohol abuse  - drinks [place] whiskey 1 bottle/day -> started recently as patient is going frequently to pubs with his friends but patient started drinking alcohol at age 19  - patient was having nausea and  vomiting next morning after drinking alcohol  - decided to stop completely 5 days ago  - started complaining of tremor, N/V, headache, sweating  - unemployed, recently graduated highschool, no plans for college  - lives with mother and 2 sisters  - hemodynamically stable  - s/p magnesium in ED  - addiction medicine appreciated   - helen changed to symptom triggered, pt remains to be in withdrawal   - CATCH team eval    #suspected magnesium deff  - repleted  - monitor    #suspected folate and thiamine deficiency  - c/w folate and thiamine supplementation    #lactemia- will trend 5.8-->2.35-->1.8    #Progress Note Handoff  Pending (specify):  etoh w/d protcol  Disposition: home  Anticipated date: 48-72hrs

## 2022-11-26 DIAGNOSIS — J45.909 UNSPECIFIED ASTHMA, UNCOMPLICATED: ICD-10-CM

## 2022-11-26 DIAGNOSIS — F10.239 ALCOHOL DEPENDENCE WITH WITHDRAWAL, UNSPECIFIED: ICD-10-CM

## 2022-11-26 DIAGNOSIS — E51.9 THIAMINE DEFICIENCY, UNSPECIFIED: ICD-10-CM

## 2022-11-26 DIAGNOSIS — E83.42 HYPOMAGNESEMIA: ICD-10-CM

## 2022-11-26 DIAGNOSIS — E87.20 ACIDOSIS, UNSPECIFIED: ICD-10-CM

## 2022-11-26 DIAGNOSIS — E53.8 DEFICIENCY OF OTHER SPECIFIED B GROUP VITAMINS: ICD-10-CM

## 2022-11-26 DIAGNOSIS — F10.20 ALCOHOL DEPENDENCE, UNCOMPLICATED: ICD-10-CM

## 2022-11-28 LAB
ALPRAZ UR CFM-MCNC: SIGNIFICANT CHANGE UP NG/ML
ALPRAZ UR CFM-MCNC: SIGNIFICANT CHANGE UP NG/ML
AMPHET UR-MCNC: NEGATIVE — SIGNIFICANT CHANGE UP
BARBITURATES, URINE.: NEGATIVE — SIGNIFICANT CHANGE UP
BENZODIAZ UR-MCNC: SIGNIFICANT CHANGE UP
COCAINE METAB.OTHER UR-MCNC: NEGATIVE — SIGNIFICANT CHANGE UP
CREATININE, URINE THERAPEUTIC: 57.5 MG/DL — SIGNIFICANT CHANGE UP
LORAZEPAM UR CFM-MCNC: 290 NG/ML — SIGNIFICANT CHANGE UP
METHADONE UR-MCNC: NEGATIVE — SIGNIFICANT CHANGE UP
METHAQUALONE UR QL: NEGATIVE — SIGNIFICANT CHANGE UP
METHAQUALONE UR-MCNC: NEGATIVE — SIGNIFICANT CHANGE UP
NORDIAZEPAM UR CFM-MCNC: SIGNIFICANT CHANGE UP NG/ML
OPIATES UR-MCNC: NEGATIVE — SIGNIFICANT CHANGE UP
OXAZEPAM UR CFM-MCNC: SIGNIFICANT CHANGE UP NG/ML
PCP UR-MCNC: NEGATIVE — SIGNIFICANT CHANGE UP
PROPOXYPH UR QL: NEGATIVE — SIGNIFICANT CHANGE UP
TEMAZEPAM UR CFM-MCNC: SIGNIFICANT CHANGE UP NG/ML
THC UR QL CFM: 399 NG/ML — SIGNIFICANT CHANGE UP
THC UR QL: SIGNIFICANT CHANGE UP

## 2022-12-08 LAB — DRUG SCREEN, SERUM: SIGNIFICANT CHANGE UP

## 2023-01-17 ENCOUNTER — EMERGENCY (EMERGENCY)
Facility: HOSPITAL | Age: 28
LOS: 0 days | Discharge: HOME | End: 2023-01-17
Attending: EMERGENCY MEDICINE | Admitting: EMERGENCY MEDICINE
Payer: MEDICAID

## 2023-01-17 VITALS
TEMPERATURE: 96 F | RESPIRATION RATE: 20 BRPM | OXYGEN SATURATION: 98 % | HEART RATE: 86 BPM | DIASTOLIC BLOOD PRESSURE: 67 MMHG | SYSTOLIC BLOOD PRESSURE: 119 MMHG

## 2023-01-17 VITALS
HEART RATE: 84 BPM | DIASTOLIC BLOOD PRESSURE: 65 MMHG | TEMPERATURE: 96 F | WEIGHT: 158.07 LBS | RESPIRATION RATE: 20 BRPM | OXYGEN SATURATION: 98 % | HEIGHT: 72 IN | SYSTOLIC BLOOD PRESSURE: 145 MMHG

## 2023-01-17 DIAGNOSIS — R19.7 DIARRHEA, UNSPECIFIED: ICD-10-CM

## 2023-01-17 DIAGNOSIS — R10.9 UNSPECIFIED ABDOMINAL PAIN: ICD-10-CM

## 2023-01-17 DIAGNOSIS — F10.10 ALCOHOL ABUSE, UNCOMPLICATED: ICD-10-CM

## 2023-01-17 DIAGNOSIS — F12.10 CANNABIS ABUSE, UNCOMPLICATED: ICD-10-CM

## 2023-01-17 DIAGNOSIS — Z91.013 ALLERGY TO SEAFOOD: ICD-10-CM

## 2023-01-17 DIAGNOSIS — J45.909 UNSPECIFIED ASTHMA, UNCOMPLICATED: ICD-10-CM

## 2023-01-17 DIAGNOSIS — Z90.49 ACQUIRED ABSENCE OF OTHER SPECIFIED PARTS OF DIGESTIVE TRACT: Chronic | ICD-10-CM

## 2023-01-17 DIAGNOSIS — R11.2 NAUSEA WITH VOMITING, UNSPECIFIED: ICD-10-CM

## 2023-01-17 LAB
ALBUMIN SERPL ELPH-MCNC: 4.1 G/DL — SIGNIFICANT CHANGE UP (ref 3.5–5.2)
ALP SERPL-CCNC: 72 U/L — SIGNIFICANT CHANGE UP (ref 30–115)
ALT FLD-CCNC: 14 U/L — SIGNIFICANT CHANGE UP (ref 0–41)
AMPHET UR-MCNC: NEGATIVE — SIGNIFICANT CHANGE UP
ANION GAP SERPL CALC-SCNC: 13 MMOL/L — SIGNIFICANT CHANGE UP (ref 7–14)
APPEARANCE UR: CLEAR — SIGNIFICANT CHANGE UP
AST SERPL-CCNC: 17 U/L — SIGNIFICANT CHANGE UP (ref 0–41)
BARBITURATES UR SCN-MCNC: NEGATIVE — SIGNIFICANT CHANGE UP
BASOPHILS # BLD AUTO: 0.05 K/UL — SIGNIFICANT CHANGE UP (ref 0–0.2)
BASOPHILS NFR BLD AUTO: 0.4 % — SIGNIFICANT CHANGE UP (ref 0–1)
BENZODIAZ UR-MCNC: POSITIVE
BILIRUB SERPL-MCNC: 1 MG/DL — SIGNIFICANT CHANGE UP (ref 0.2–1.2)
BILIRUB UR-MCNC: NEGATIVE — SIGNIFICANT CHANGE UP
BUN SERPL-MCNC: 9 MG/DL — LOW (ref 10–20)
CALCIUM SERPL-MCNC: 8.9 MG/DL — SIGNIFICANT CHANGE UP (ref 8.4–10.5)
CHLORIDE SERPL-SCNC: 97 MMOL/L — LOW (ref 98–110)
CO2 SERPL-SCNC: 23 MMOL/L — SIGNIFICANT CHANGE UP (ref 17–32)
COCAINE METAB.OTHER UR-MCNC: NEGATIVE — SIGNIFICANT CHANGE UP
COLOR SPEC: YELLOW — SIGNIFICANT CHANGE UP
CREAT SERPL-MCNC: 0.8 MG/DL — SIGNIFICANT CHANGE UP (ref 0.7–1.5)
DIFF PNL FLD: NEGATIVE — SIGNIFICANT CHANGE UP
DRUG SCREEN 1, URINE RESULT: SIGNIFICANT CHANGE UP
EGFR: 126 ML/MIN/1.73M2 — SIGNIFICANT CHANGE UP
EOSINOPHIL # BLD AUTO: 0 K/UL — SIGNIFICANT CHANGE UP (ref 0–0.7)
EOSINOPHIL NFR BLD AUTO: 0 % — SIGNIFICANT CHANGE UP (ref 0–8)
ETHANOL SERPL-MCNC: <10 MG/DL — SIGNIFICANT CHANGE UP
FENTANYL UR QL: NEGATIVE — SIGNIFICANT CHANGE UP
GLUCOSE SERPL-MCNC: 123 MG/DL — HIGH (ref 70–99)
GLUCOSE UR QL: NEGATIVE MG/DL — SIGNIFICANT CHANGE UP
HCT VFR BLD CALC: 40.7 % — LOW (ref 42–52)
HGB BLD-MCNC: 14.9 G/DL — SIGNIFICANT CHANGE UP (ref 14–18)
IMM GRANULOCYTES NFR BLD AUTO: 0.4 % — HIGH (ref 0.1–0.3)
KETONES UR-MCNC: 40
LEUKOCYTE ESTERASE UR-ACNC: NEGATIVE — SIGNIFICANT CHANGE UP
LIDOCAIN IGE QN: 14 U/L — SIGNIFICANT CHANGE UP (ref 7–60)
LYMPHOCYTES # BLD AUTO: 1.61 K/UL — SIGNIFICANT CHANGE UP (ref 1.2–3.4)
LYMPHOCYTES # BLD AUTO: 11.7 % — LOW (ref 20.5–51.1)
MAGNESIUM SERPL-MCNC: 1.8 MG/DL — SIGNIFICANT CHANGE UP (ref 1.8–2.4)
MCHC RBC-ENTMCNC: 30.7 PG — SIGNIFICANT CHANGE UP (ref 27–31)
MCHC RBC-ENTMCNC: 36.6 G/DL — SIGNIFICANT CHANGE UP (ref 32–37)
MCV RBC AUTO: 83.9 FL — SIGNIFICANT CHANGE UP (ref 80–94)
METHADONE UR-MCNC: NEGATIVE — SIGNIFICANT CHANGE UP
MONOCYTES # BLD AUTO: 0.9 K/UL — HIGH (ref 0.1–0.6)
MONOCYTES NFR BLD AUTO: 6.6 % — SIGNIFICANT CHANGE UP (ref 1.7–9.3)
NEUTROPHILS # BLD AUTO: 11.1 K/UL — HIGH (ref 1.4–6.5)
NEUTROPHILS NFR BLD AUTO: 80.9 % — HIGH (ref 42.2–75.2)
NITRITE UR-MCNC: NEGATIVE — SIGNIFICANT CHANGE UP
NRBC # BLD: 0 /100 WBCS — SIGNIFICANT CHANGE UP (ref 0–0)
OPIATES UR-MCNC: NEGATIVE — SIGNIFICANT CHANGE UP
OXYCODONE UR-MCNC: NEGATIVE — SIGNIFICANT CHANGE UP
PCP UR-MCNC: NEGATIVE — SIGNIFICANT CHANGE UP
PH UR: 6 — SIGNIFICANT CHANGE UP (ref 5–8)
PLATELET # BLD AUTO: 423 K/UL — HIGH (ref 130–400)
POTASSIUM SERPL-MCNC: 3.6 MMOL/L — SIGNIFICANT CHANGE UP (ref 3.5–5)
POTASSIUM SERPL-SCNC: 3.6 MMOL/L — SIGNIFICANT CHANGE UP (ref 3.5–5)
PROPOXYPHENE QUALITATIVE URINE RESULT: NEGATIVE — SIGNIFICANT CHANGE UP
PROT SERPL-MCNC: 6.5 G/DL — SIGNIFICANT CHANGE UP (ref 6–8)
PROT UR-MCNC: NEGATIVE MG/DL — SIGNIFICANT CHANGE UP
RBC # BLD: 4.85 M/UL — SIGNIFICANT CHANGE UP (ref 4.7–6.1)
RBC # FLD: 12.6 % — SIGNIFICANT CHANGE UP (ref 11.5–14.5)
SODIUM SERPL-SCNC: 133 MMOL/L — LOW (ref 135–146)
SP GR SPEC: 1.01 — SIGNIFICANT CHANGE UP (ref 1.01–1.03)
THC UR QL: POSITIVE
UROBILINOGEN FLD QL: 0.2 MG/DL — SIGNIFICANT CHANGE UP
WBC # BLD: 13.71 K/UL — HIGH (ref 4.8–10.8)
WBC # FLD AUTO: 13.71 K/UL — HIGH (ref 4.8–10.8)

## 2023-01-17 PROCEDURE — 93010 ELECTROCARDIOGRAM REPORT: CPT

## 2023-01-17 PROCEDURE — 99284 EMERGENCY DEPT VISIT MOD MDM: CPT

## 2023-01-17 RX ORDER — ALBUTEROL 90 UG/1
2 AEROSOL, METERED ORAL
Qty: 0 | Refills: 0 | DISCHARGE

## 2023-01-17 RX ORDER — ONDANSETRON 8 MG/1
4 TABLET, FILM COATED ORAL ONCE
Refills: 0 | Status: COMPLETED | OUTPATIENT
Start: 2023-01-17 | End: 2023-01-17

## 2023-01-17 RX ORDER — SODIUM CHLORIDE 9 MG/ML
1000 INJECTION INTRAMUSCULAR; INTRAVENOUS; SUBCUTANEOUS ONCE
Refills: 0 | Status: COMPLETED | OUTPATIENT
Start: 2023-01-17 | End: 2023-01-17

## 2023-01-17 RX ADMIN — SODIUM CHLORIDE 1000 MILLILITER(S): 9 INJECTION INTRAMUSCULAR; INTRAVENOUS; SUBCUTANEOUS at 12:16

## 2023-01-17 RX ADMIN — ONDANSETRON 4 MILLIGRAM(S): 8 TABLET, FILM COATED ORAL at 12:16

## 2023-01-17 RX ADMIN — Medication 25 MILLIGRAM(S): at 14:15

## 2023-01-17 NOTE — ED PROVIDER NOTE - ATTENDING APP SHARED VISIT CONTRIBUTION OF CARE
27 y.o. male, PMH of alcohol abuse, presents to the ED for evaluation of nausea, NBNB vomiting and diarrhea x 5 days. Patient states he is unable to tolerate PO. No known sick contacts. Patient states last time he used alcohol was 2 wks ago. Patient smokes marijuana daily, last use last night. No fever/chills. No CP/SOB, abdominal pain, urinary symptoms or hematuria. Pt denies SI/HI/AH/VH. On exam, pt in NAD, AAOx3, head NC/AT, CN II-XII intact, PEERL, EOMi, neck (-) midline tenderness, lungs CTA B/L, CV S1S2 regular, abdomen soft/NT/ND/(+)BS, ext (-) edema, (+) tremor, motor 5/5x4, sensation intact, ambulating with steady gait. Labs done and reviewed. Librium given with resolution of tremors. Pt is tolerating PO in the ED. Catch team evaluated pt, working on establishing outpt detox/rehab. Will d/c.

## 2023-01-17 NOTE — ED PROVIDER NOTE - PATIENT PORTAL LINK FT
You can access the FollowMyHealth Patient Portal offered by [facility] by registering at the following website: [email or web address]. By joining [employer]’s FollowMyHealth portal, you will also be able to view your health information using other applications (apps) compatible with our system.

## 2023-01-17 NOTE — ED ADULT TRIAGE NOTE - CHIEF COMPLAINT QUOTE
as per pt " I'm withdrawing from alcohol and weed, been shaking for few days and cannot keep any food down, my belly hurts too, I usually have 1L of henessy/day, last drink on New years, weed -yesterday"

## 2023-01-17 NOTE — ED PROVIDER NOTE - NSFOLLOWUPINSTRUCTIONS_ED_ALL_ED_FT
Nausea / Vomiting    Nausea is the feeling that you have to vomit. As nausea gets worse, it can lead to vomiting. Vomiting puts you at an increased risk for dehydration. Older adults and people with other diseases or a weak immune system are at higher risk for dehydration. Drink clear fluids in small but frequent amounts as tolerated. Eat bland, easy-to-digest foods in small amounts as tolerated.    SEEK IMMEDIATE MEDICAL CARE IF YOU HAVE ANY OF THE FOLLOWING SYMPTOMS: fever, inability to keep sufficient fluids down, black or bloody vomitus, black or bloody stools, lightheadedness/dizziness, chest pain, severe headache, rash, shortness of breath, cold or clammy skin, confusion, pain with urination, or any signs of dehydration.      Alcohol Abuse    Alcohol intoxication occurs when the amount of alcohol that a person has consumed impairs his or her ability to mentally and physically function. Chronic alcohol consumption can also lead to a variety of health issues including neurological disease, stomach disease, heart disease, liver disease, etc. Do not drive after drinking alcohol. Drinking enough alcohol to end up in an Emergency Room suggests you may have an alcohol abuse problem. Seek help at a drug addiction center.    SEEK IMMEDIATE MEDICAL CARE IF YOU HAVE ANY OF THE FOLLOWING SYMPTOMS: seizures, vomiting blood, blood in your stool, lightheadedness/dizziness, or becoming shaky to tremulous when you stop drinking.

## 2023-01-17 NOTE — ED PROVIDER NOTE - OBJECTIVE STATEMENT
28 y/o male with hx of alcohol abuse presents to the ED for evaluation of nausea, nbnb vomitus and diarrhea x 5 days. patient unable to tolerate po. no known sick contacts. patient with last alcohol intake 2 weeks ago. patient smokes marijuana last usage last night. patient denies any fevers. no abdominal pain. no dysuria or gross hematuria. no weakness, tingling to extremities. patient denies any blackouts , seizures, tremors. patient c/o dry mouth. no SI/HI

## 2023-01-17 NOTE — ED PROVIDER NOTE - PHYSICAL EXAMINATION
Vital Signs: I have reviewed the initial vital signs.  Constitutional: well-nourished, no acute distress, normocephalic  Eyes: PERRLA, EOMI, no nystagmus, clear conjunctiva  ENT: dry mucous membranes  Cardiovascular: regular rate, regular rhythm, no murmur appreciated  Respiratory: unlabored respiratory effort, clear to auscultation bilaterally  Gastrointestinal: soft, non-tender, non-distended  abdomen, no pulsatile mass  Musculoskeletal: supple neck, no lower extremity edema, no bony tenderness  Integumentary: warm, dry, no rash  Neurologic: awake, alert, cranial nerves II-XII grossly intact, extremities’ motor and sensory functions grossly intact, no focal deficits, GCS 15

## 2023-01-17 NOTE — ED PROVIDER NOTE - NSFOLLOWUPCLINICS_GEN_ALL_ED_FT
[facility]  Detox Mgmt  [address] [place] [address]  Phone: [phone]  Fax:

## 2023-01-17 NOTE — SBIRT NOTE ADULT - NSSBIRTBRIEFINTDET_GEN_A_CORE
Screening results were reviewed with the patient and patient was provided information about healthy guidelines and potential negative consequences associated with level of risk. Motivation and readiness to reduce or stop use was discussed and goals and activities to make changes were suggested/offered. Patient referral to inpatient rehab managed by [facility] CATCH team. Health  offered assistance to CATCH team, staff will manage referral. Patient provided with [facility] contact info for assistance with outpatient referral post rehab.

## 2023-01-21 LAB
CARBOXYTHC UR CFM-MCNC: 187 NG/ML — SIGNIFICANT CHANGE UP
CARBOXYTHC UR QL SCN: 36.8 MG/DL — SIGNIFICANT CHANGE UP
CARBOXYTHC UR QL SCN: 508 — SIGNIFICANT CHANGE UP
THC CREATININE URINE: 36.8 MG/DL — SIGNIFICANT CHANGE UP
THC METABOLITE/CREAT URINE: 508 — SIGNIFICANT CHANGE UP

## 2024-08-14 ENCOUNTER — EMERGENCY (EMERGENCY)
Facility: HOSPITAL | Age: 29
LOS: 0 days | Discharge: HOME | End: 2024-08-14
Attending: STUDENT IN AN ORGANIZED HEALTH CARE EDUCATION/TRAINING PROGRAM
Payer: MEDICAID

## 2024-08-14 ENCOUNTER — EMERGENCY (EMERGENCY)
Facility: HOSPITAL | Age: 29
LOS: 0 days | Discharge: HOME | End: 2024-08-14
Attending: EMERGENCY MEDICINE
Payer: MEDICAID

## 2024-08-14 VITALS
WEIGHT: 179.9 LBS | DIASTOLIC BLOOD PRESSURE: 72 MMHG | RESPIRATION RATE: 18 BRPM | OXYGEN SATURATION: 96 % | TEMPERATURE: 98 F | SYSTOLIC BLOOD PRESSURE: 134 MMHG | HEART RATE: 79 BPM

## 2024-08-14 VITALS
SYSTOLIC BLOOD PRESSURE: 129 MMHG | DIASTOLIC BLOOD PRESSURE: 71 MMHG | RESPIRATION RATE: 18 BRPM | OXYGEN SATURATION: 97 % | HEART RATE: 58 BPM

## 2024-08-14 VITALS
DIASTOLIC BLOOD PRESSURE: 68 MMHG | RESPIRATION RATE: 18 BRPM | SYSTOLIC BLOOD PRESSURE: 108 MMHG | HEART RATE: 89 BPM | OXYGEN SATURATION: 98 %

## 2024-08-14 DIAGNOSIS — F12.90 CANNABIS USE, UNSPECIFIED, UNCOMPLICATED: ICD-10-CM

## 2024-08-14 DIAGNOSIS — Z90.49 ACQUIRED ABSENCE OF OTHER SPECIFIED PARTS OF DIGESTIVE TRACT: Chronic | ICD-10-CM

## 2024-08-14 DIAGNOSIS — J45.909 UNSPECIFIED ASTHMA, UNCOMPLICATED: ICD-10-CM

## 2024-08-14 DIAGNOSIS — R11.2 NAUSEA WITH VOMITING, UNSPECIFIED: ICD-10-CM

## 2024-08-14 DIAGNOSIS — Z91.013 ALLERGY TO SEAFOOD: ICD-10-CM

## 2024-08-14 DIAGNOSIS — R10.9 UNSPECIFIED ABDOMINAL PAIN: ICD-10-CM

## 2024-08-14 LAB
ALBUMIN SERPL ELPH-MCNC: 5 G/DL — SIGNIFICANT CHANGE UP (ref 3.5–5.2)
ALP SERPL-CCNC: 91 U/L — SIGNIFICANT CHANGE UP (ref 30–115)
ALT FLD-CCNC: 30 U/L — SIGNIFICANT CHANGE UP (ref 0–41)
ANION GAP SERPL CALC-SCNC: 22 MMOL/L — HIGH (ref 7–14)
APAP SERPL-MCNC: <5 UG/ML — LOW (ref 10–30)
AST SERPL-CCNC: 25 U/L — SIGNIFICANT CHANGE UP (ref 0–41)
BASOPHILS # BLD AUTO: 0.07 K/UL — SIGNIFICANT CHANGE UP (ref 0–0.2)
BASOPHILS NFR BLD AUTO: 0.6 % — SIGNIFICANT CHANGE UP (ref 0–1)
BILIRUB SERPL-MCNC: 0.6 MG/DL — SIGNIFICANT CHANGE UP (ref 0.2–1.2)
BUN SERPL-MCNC: 10 MG/DL — SIGNIFICANT CHANGE UP (ref 10–20)
CALCIUM SERPL-MCNC: 9.9 MG/DL — SIGNIFICANT CHANGE UP (ref 8.4–10.5)
CHLORIDE SERPL-SCNC: 104 MMOL/L — SIGNIFICANT CHANGE UP (ref 98–110)
CO2 SERPL-SCNC: 16 MMOL/L — LOW (ref 17–32)
CREAT SERPL-MCNC: 0.9 MG/DL — SIGNIFICANT CHANGE UP (ref 0.7–1.5)
EGFR: 120 ML/MIN/1.73M2 — SIGNIFICANT CHANGE UP
EOSINOPHIL # BLD AUTO: 0.03 K/UL — SIGNIFICANT CHANGE UP (ref 0–0.7)
EOSINOPHIL NFR BLD AUTO: 0.3 % — SIGNIFICANT CHANGE UP (ref 0–8)
ETHANOL SERPL-MCNC: <10 MG/DL — SIGNIFICANT CHANGE UP
GLUCOSE SERPL-MCNC: 121 MG/DL — HIGH (ref 70–99)
HCT VFR BLD CALC: 47 % — SIGNIFICANT CHANGE UP (ref 42–52)
HGB BLD-MCNC: 16.9 G/DL — SIGNIFICANT CHANGE UP (ref 14–18)
IMM GRANULOCYTES NFR BLD AUTO: 0.5 % — HIGH (ref 0.1–0.3)
LIDOCAIN IGE QN: 10 U/L — SIGNIFICANT CHANGE UP (ref 7–60)
LYMPHOCYTES # BLD AUTO: 18.9 % — LOW (ref 20.5–51.1)
LYMPHOCYTES # BLD AUTO: 2.23 K/UL — SIGNIFICANT CHANGE UP (ref 1.2–3.4)
MCHC RBC-ENTMCNC: 30.7 PG — SIGNIFICANT CHANGE UP (ref 27–31)
MCHC RBC-ENTMCNC: 36 G/DL — SIGNIFICANT CHANGE UP (ref 32–37)
MCV RBC AUTO: 85.5 FL — SIGNIFICANT CHANGE UP (ref 80–94)
MONOCYTES # BLD AUTO: 0.7 K/UL — HIGH (ref 0.1–0.6)
MONOCYTES NFR BLD AUTO: 5.9 % — SIGNIFICANT CHANGE UP (ref 1.7–9.3)
NEUTROPHILS # BLD AUTO: 8.73 K/UL — HIGH (ref 1.4–6.5)
NEUTROPHILS NFR BLD AUTO: 73.8 % — SIGNIFICANT CHANGE UP (ref 42.2–75.2)
NRBC # BLD: 0 /100 WBCS — SIGNIFICANT CHANGE UP (ref 0–0)
PLATELET # BLD AUTO: 344 K/UL — SIGNIFICANT CHANGE UP (ref 130–400)
PMV BLD: 9.5 FL — SIGNIFICANT CHANGE UP (ref 7.4–10.4)
POTASSIUM SERPL-MCNC: 4.2 MMOL/L — SIGNIFICANT CHANGE UP (ref 3.5–5)
POTASSIUM SERPL-SCNC: 4.2 MMOL/L — SIGNIFICANT CHANGE UP (ref 3.5–5)
PROT SERPL-MCNC: 7.6 G/DL — SIGNIFICANT CHANGE UP (ref 6–8)
RBC # BLD: 5.5 M/UL — SIGNIFICANT CHANGE UP (ref 4.7–6.1)
RBC # FLD: 12.9 % — SIGNIFICANT CHANGE UP (ref 11.5–14.5)
SALICYLATES SERPL-MCNC: <0.3 MG/DL — LOW (ref 4–30)
SODIUM SERPL-SCNC: 142 MMOL/L — SIGNIFICANT CHANGE UP (ref 135–146)
WBC # BLD: 11.82 K/UL — HIGH (ref 4.8–10.8)
WBC # FLD AUTO: 11.82 K/UL — HIGH (ref 4.8–10.8)

## 2024-08-14 PROCEDURE — 99285 EMERGENCY DEPT VISIT HI MDM: CPT

## 2024-08-14 PROCEDURE — 99284 EMERGENCY DEPT VISIT MOD MDM: CPT | Mod: 25

## 2024-08-14 PROCEDURE — 96374 THER/PROPH/DIAG INJ IV PUSH: CPT | Mod: XU

## 2024-08-14 PROCEDURE — 85025 COMPLETE CBC W/AUTO DIFF WBC: CPT

## 2024-08-14 PROCEDURE — 80307 DRUG TEST PRSMV CHEM ANLYZR: CPT

## 2024-08-14 PROCEDURE — 80053 COMPREHEN METABOLIC PANEL: CPT

## 2024-08-14 PROCEDURE — 74177 CT ABD & PELVIS W/CONTRAST: CPT | Mod: MC

## 2024-08-14 PROCEDURE — 96375 TX/PRO/DX INJ NEW DRUG ADDON: CPT

## 2024-08-14 PROCEDURE — 96374 THER/PROPH/DIAG INJ IV PUSH: CPT

## 2024-08-14 PROCEDURE — 83690 ASSAY OF LIPASE: CPT

## 2024-08-14 PROCEDURE — 36415 COLL VENOUS BLD VENIPUNCTURE: CPT

## 2024-08-14 PROCEDURE — 74177 CT ABD & PELVIS W/CONTRAST: CPT | Mod: 26,MC

## 2024-08-14 RX ORDER — FAMOTIDINE 40 MG/1
20 TABLET, FILM COATED ORAL DAILY
Refills: 0 | Status: DISCONTINUED | OUTPATIENT
Start: 2024-08-14 | End: 2024-08-14

## 2024-08-14 RX ORDER — CHLORDIAZEPOXIDE HCL 10 MG
50 CAPSULE ORAL ONCE
Refills: 0 | Status: DISCONTINUED | OUTPATIENT
Start: 2024-08-14 | End: 2024-08-14

## 2024-08-14 RX ORDER — PRAMIPEXOLE DIHYDROCHLORIDE 0.5 MG/1
5 TABLET ORAL ONCE
Refills: 0 | Status: DISCONTINUED | OUTPATIENT
Start: 2024-08-14 | End: 2024-08-14

## 2024-08-14 RX ORDER — ONDANSETRON HCL/PF 4 MG/2 ML
4 VIAL (ML) INJECTION ONCE
Refills: 0 | Status: COMPLETED | OUTPATIENT
Start: 2024-08-14 | End: 2024-08-14

## 2024-08-14 RX ORDER — BUSPIRONE HYDROCHLORIDE 15 MG/1
1 TABLET ORAL
Refills: 0 | DISCHARGE

## 2024-08-14 RX ORDER — BACTERIOSTATIC SODIUM CHLORIDE 0.9 %
1000 VIAL (ML) INJECTION ONCE
Refills: 0 | Status: COMPLETED | OUTPATIENT
Start: 2024-08-14 | End: 2024-08-14

## 2024-08-14 RX ORDER — MESALAMINE 1.2 G/1
2 TABLET, DELAYED RELEASE ORAL
Refills: 0 | DISCHARGE

## 2024-08-14 RX ORDER — GINGER ROOT/GINGER ROOT EXT 262.5 MG
100 CAPSULE ORAL ONCE
Refills: 0 | Status: COMPLETED | OUTPATIENT
Start: 2024-08-14 | End: 2024-08-14

## 2024-08-14 RX ORDER — DEXTROSE MONOHYDRATE, SODIUM CHLORIDE, SODIUM LACTATE, CALCIUM CHLORIDE, MAGNESIUM CHLORIDE 1.5; 538; 448; 18.4; 5.08 G/100ML; MG/100ML; MG/100ML; MG/100ML; MG/100ML
1000 SOLUTION INTRAPERITONEAL ONCE
Refills: 0 | Status: COMPLETED | OUTPATIENT
Start: 2024-08-14 | End: 2024-08-14

## 2024-08-14 RX ORDER — MULTIVITAMIN/IRON/FOLIC ACID 18MG-0.4MG
1 TABLET ORAL ONCE
Refills: 0 | Status: COMPLETED | OUTPATIENT
Start: 2024-08-14 | End: 2024-08-14

## 2024-08-14 RX ORDER — LORAZEPAM 1 MG/1
2 TABLET ORAL ONCE
Refills: 0 | Status: DISCONTINUED | OUTPATIENT
Start: 2024-08-14 | End: 2024-08-14

## 2024-08-14 RX ADMIN — Medication 4 MILLIGRAM(S): at 18:58

## 2024-08-14 RX ADMIN — Medication 1000 MILLILITER(S): at 18:58

## 2024-08-14 RX ADMIN — Medication 100 MILLIGRAM(S): at 10:42

## 2024-08-14 RX ADMIN — DEXTROSE MONOHYDRATE, SODIUM CHLORIDE, SODIUM LACTATE, CALCIUM CHLORIDE, MAGNESIUM CHLORIDE 1000 MILLILITER(S): 1.5; 538; 448; 18.4; 5.08 SOLUTION INTRAPERITONEAL at 10:44

## 2024-08-14 RX ADMIN — Medication 50 MILLIGRAM(S): at 10:43

## 2024-08-14 RX ADMIN — FAMOTIDINE 100 MILLIGRAM(S): 40 TABLET, FILM COATED ORAL at 18:59

## 2024-08-14 RX ADMIN — Medication 4 MILLIGRAM(S): at 10:43

## 2024-08-14 RX ADMIN — LORAZEPAM 2 MILLIGRAM(S): 1 TABLET ORAL at 18:57

## 2024-08-14 RX ADMIN — Medication 1 MILLIGRAM(S): at 10:35

## 2024-08-14 NOTE — ED PROVIDER NOTE - PATIENT PORTAL LINK FT
You can access the FollowMyHealth Patient Portal offered by [facility] by registering at the following website: [email or web address]. By joining [employer]’s FollowMyHealth portal, you will also be able to view your health information using other applications (apps) compatible with our system.

## 2024-08-14 NOTE — ED PROVIDER NOTE - PHYSICAL EXAMINATION
CONST: in NAD, non-tremulous   EYES: EOMI, Sclera and conjunctiva clear.   ENT: No nasal discharge  NECK: Non-tender, no meningeal signs  CARD: Normal S1 S2; Normal rate and rhythm  RESP: Equal BS B/L, No wheezes, rhonchi or rales. No distress  GI: Soft, non-tender, non-distended.  MS: Normal ROM in all extremities. No midline spinal tenderness.  SKIN: Warm, dry, Good turgor. no diaphoresis  NEURO: A&Ox3, No focal deficits. Strength 5/5 with no sensory deficits

## 2024-08-14 NOTE — ED PROVIDER NOTE - NSFOLLOWUPINSTRUCTIONS_ED_ALL_ED_FT
Cannabinoid Hyperemesis Syndrome  Cannabinoid hyperemesis syndrome (CHS) is a condition that causes repeated nausea, vomiting, and abdominal pain after long-term use of marijuana (cannabis). People with CHS typically use marijuana 3–5 times a day for many years before they have symptoms, although it is possible to develop CHS with far less daily use.    Symptoms of CHS may be mild at first but can get worse and more frequent. In some cases, CHS may cause severe daily vomiting, which can lead to weight loss and dehydration.    What are the causes?  The exact cause of CHS is not known. Long-term use of marijuana may overstimulate certain proteins in the brain and digestive tract that react with chemicals in marijuana (cannabinoid receptors). This overstimulation may cause CHS.    What are the signs or symptoms?  Symptoms of CHS are often mild during the first few episodes, but they can get worse over time. Symptoms may include:  Frequent nausea, especially early in the morning.  Vomiting. This can become severe.  Abdominal pain.  Feeling very tired (lethargic).  Headaches.  CHS may go away and come back many times (recur). People may not have symptoms or may otherwise be healthy in between CHS episodes. Taking hot showers can relieve the symptoms of CHS, so feeling the need to take several hot showers throughout the day can be a sign of this condition.    How is this diagnosed?  CHS may be diagnosed based on:  Your symptoms and medical history, including any drug use.  A physical exam.  You may have tests done to rule out other problems that could cause your symptoms. These tests may include:  Blood tests.  Urine tests.  Imaging tests, such as an X-ray or a CT scan.  How is this treated?  Treatment for this condition involves stopping marijuana use. Treatment may include:  A drug rehab program, if you have trouble stopping marijuana use.  Medicines for nausea. These may be given at the hospital through an IV inserted into one of your veins, or they may be medicines that you take by mouth (orally).  Certain creams that contain a substance called capsaicin. These may improve symptoms when applied to the abdomen.  Hot showers to help relieve symptoms.  In severe cases, you may need treatment at a hospital. You may be given IV fluids to prevent or treat dehydration as well as medicines to treat nausea, vomiting, and pain.    Follow these instructions at home:  During an episode of CHS    Water spraying out of a showerhead.  Stay in bed and rest in a dark, quiet room.  Take anti-nausea medicine as told by your health care provider.  Try taking hot showers to relieve your symptoms.  After an episode of CHS    Drink small amounts of clear fluids. Slowly add more if you can keep the fluids down without vomiting.  Once you are able to eat without vomiting, eat soft foods in small amounts every 3–4 hours.  General instructions    Do not use any products that contain marijuana.If you need help quitting, ask your health care provider for resources and treatment options.  Drink enough fluid to keep your urine pale yellow. Avoid drinking fluids that have a lot of sugar or caffeine, such as coffee and soda.  Take and apply over-the-counter and prescription medicines only as told by your health care provider. Ask your health care provider before starting any new medicines or treatments.  Keep all follow-up visits. This includes any recommended programs for substance use disorders.  Contact a health care provider if:  Your symptoms get worse.  You cannot drink fluids without vomiting or severe pain.  You have pain and trouble swallowing after an episode.  Get help right away if:  You cannot stop vomiting.  You have blood in your vomit or your vomit looks like coffee grounds.  You have severe abdominal pain.  You have stools that are bloody or black, or stools that look like tar.  You have symptoms of dehydration, such as:  Sunken eyes.  Inability to make tears.  Cracked lips or dry mouth.  Decreased urine production.  Weakness.  Sleepiness.  Dizziness, light-headedness, or fainting.  These symptoms may be an emergency. Get help right away. Call 911.  Do not wait to see if the symptoms will go away.  Do not drive yourself to the hospital.  Summary  Cannabinoid hyperemesis syndrome (CHS) is a condition that causes repeated nausea, vomiting, and abdominal pain after long-term use of marijuana.  Treatment for this condition involves stopping marijuana use. Hot showers and capsaicin creams may also help relieve symptoms.  Your health care provider may prescribe medicines to help with nausea.  Ask your health care provider before starting any medicines or other treatments.  This information is not intended to replace advice given to you by your health care provider. Make sure you discuss any questions you have with your health care provider.

## 2024-08-14 NOTE — ED PROVIDER NOTE - PHYSICAL EXAMINATION
CONST: Well appearing in NAD  EYES: PERRL, EOMI, Sclera and conjunctiva clear.   ENT: Oropharynx normal appearing, no erythema or exudates. Uvula midline.  CARD: Normal S1 S2; Normal rate and rhythm  RESP: Equal BS B/L, No wheezes, rhonchi or rales. No distress  GI: Mild abd tenderness, no rebound or guarding.   MS: Normal ROM in all extremities. No midline spinal tenderness.  SKIN: Warm, dry, no acute rashes.   NEURO: A&Ox3, No focal deficits. Strength 5/5 with no sensory deficits. Steady gait

## 2024-08-14 NOTE — ED ADULT NURSE NOTE - CHIEF COMPLAINT QUOTE
PT BIBEMS for etoh intoxication with nausea and vomiting. PT reports that he drank 3 bottles of [place] and Heineken last night. PT a&ox 4 in triage. PT refused temp in triage due to vomiting.

## 2024-08-14 NOTE — ED ADULT NURSE NOTE - NSFALLUNIVINTERV_ED_ALL_ED
Bed/Stretcher in lowest position, wheels locked, appropriate side rails in place/Call bell, personal items and telephone in reach/Instruct patient to call for assistance before getting out of bed/chair/stretcher/Non-slip footwear applied when patient is off stretcher/[place] to call system/Physically safe environment - no spills, clutter or unnecessary equipment/Purposeful proactive rounding/Room/bathroom lighting operational, light cord in reach

## 2024-08-14 NOTE — ED PROVIDER NOTE - ATTENDING APP SHARED VISIT CONTRIBUTION OF CARE
29-year-old male history of alcohol use.  States that he used alcohol last night.  That time before that was several months ago.  He does endorse marijuana use last night 2.  This morning when he woke up he felt like he was shaking uncontrollably with difficulty movement.  He went to the [facility].  There was given symptomatic care and discharged.  Now patient returns complaining of persistent nausea and vomiting without any relief of symptoms.  He does endorse abdominal discomfort.  On exam there is some abdominal tenderness in the epigastric area he does have some shaking.  His pupils are normal.  Oropharynx looks dry.  Plan is to obtain labs IV obtain CT of the abdomen and treat symptomatically with Ativan.  Differential includes withdrawal however this is unlikely given the infrequency of alcohol use.  Differential also includes reaction to marijuana.

## 2024-08-14 NOTE — ED PROVIDER NOTE - RHYTHM STRIP/ULTRASOUND IMAGES/CT SCAN IMAGES SHOWED
Independent interpretation of the CT scan as a preliminary reading by Dr. Stephan Sotelo shows Independent interpretation of the CT scan as a preliminary reading by Dr. Stephan Sotelo shows no acute patholgoy

## 2024-08-14 NOTE — ED ADULT TRIAGE NOTE - CHIEF COMPLAINT QUOTE
PT BIBEMS for etoh intoxication with nausea and vomiting. PT reports that he drank 3 bottles of [place] and Heineken last night. PT a&ox 4 in triage. PT refused temp in triage due to vomiting.

## 2024-08-14 NOTE — ED PROVIDER NOTE - NSFOLLOWUPINSTRUCTIONS_ED_ALL_ED_FT
FOLLOW UP WITH YOUR PRIMARY DOCTOR  RETURN TO ED FOR NEW OR WORSENING SYMPTOMS     Nausea / Vomiting    Nausea is the feeling that you have to vomit. As nausea gets worse, it can lead to vomiting. Vomiting puts you at an increased risk for dehydration. Older adults and people with other diseases or a weak immune system are at higher risk for dehydration. Drink clear fluids in small but frequent amounts as tolerated. Eat bland, easy-to-digest foods in small amounts as tolerated.    SEEK IMMEDIATE MEDICAL CARE IF YOU HAVE ANY OF THE FOLLOWING SYMPTOMS: fever, inability to keep sufficient fluids down, black or bloody vomitus, black or bloody stools, lightheadedness/dizziness, chest pain, severe headache, rash, shortness of breath, cold or clammy skin, confusion, pain with urination, or any signs of dehydration.

## 2024-08-14 NOTE — ED ADULT NURSE NOTE - NSICDXPASTMEDICALHX_GEN_ALL_CORE_FT
PAST MEDICAL HISTORY:  Anxiety     Asthma     Crohn's disease      PAST MEDICAL HISTORY:  Asthma

## 2024-08-14 NOTE — ED PROVIDER NOTE - CLINICAL SUMMARY MEDICAL DECISION MAKING FREE TEXT BOX
30yo male PMH etoh abuse, asthma coming to ED with nausea and vomiting. pt reports drinking 2-3 bottles of [place] and 1 Heineken last night around 2am, has had multiple episodes of NBNB vomiting since. has associated abdominal discomfort. pt reports drinking "once every few months" and has withdrawn from alcohol in the past, never had seizure related to withdrawal, states he feels like he is withdrawing now. denies chest pain, SOB, headache, SI/ HI, auditory/ visual hallucinations, fever, unilateral numbness/ paresthesias, fall/ trauma. willing to see SBIRT    AOx4, Non toxic appearing, NAD, speaking in full sentences. Skin  warm and dry, no acute rash. Head normocephalic, atraumatic. PERRLA/EOMI, conjunctiva and sclera clear. MM moist, no nasal discharge.  Pharynx and TM's unremarkable.  No mastoid or temporal ttp. Neck supple nt, no meningeal signs. Heart RRR s1s2 nl, no rub/murmur. Lungs- No retractions, BS equal, CTAB. Abdomen soft ntnd no r/g, distractable ttp epigastrium. Extremities- moves all, +equal distal pulses, brisk cap refill, sensation wnl, normal ROM. No LE edema, calves nttp b/l. CIWA 5    I have considered a variety of diagnoses for this patient, including but not limited to:  withdrawal, gastro, gastritis, etoh intox    plan: clinical sobriety, meds, labs, reassess, sbirt    dispo: home     Labs and EKG were ordered and reviewed.  Imaging was ordered and reviewed by me.  Appropriate medications for patient's presenting complaints were ordered and effects were reassessed.  Patient's records (prior hospital, ED visit, and/or nursing home notes if available) were reviewed.  Additional history was obtained from EMS, family, and/or PCP (where available).  Escalation to admission/observation was considered.  However patient feels much better and is comfortable with discharge.  Appropriate follow-up was arranged.

## 2024-08-14 NOTE — ED PROVIDER NOTE - PATIENT PORTAL LINK FT
You can access the FollowMyHealth Patient Portal offered by [facility] by registering at the following website: [email or web address]. By joining [facility]’s FollowMyHealth portal, you will also be able to view your health information using other applications (apps) compatible with our system.

## 2024-08-14 NOTE — ED PROVIDER NOTE - OBJECTIVE STATEMENT
patient is a 28yo male PMH etoh abuse, asthma coming to ED with nausea and vomiting. pt reports drinking 2-3 bottles of [place] and 1 Heineken last night around 2am, has had multiple episodes of NBNB vomiting since. has associated abdominal discomfort. pt reports drinking "once every few months" and has withdrawn from alcohol in the past, never had seizure related to withdrawal, states he feels like he is withdrawing now. denies chest pain, SOB, headache, SI/ HI, auditory/ visual hallucinations, fever, unilateral numbness/ paresthesias, fall/ trauma

## 2024-08-14 NOTE — ED ADULT NURSE NOTE - OBJECTIVE STATEMENT
pt drank 3 bottles of [place] and [place] last night, c/o nausea and vomiting. A+Ox4, ambulates with steady gait.

## 2024-08-14 NOTE — ED PROVIDER NOTE - OBJECTIVE STATEMENT
28 yo male pmhx of ETOH abuse, polysubstance abuse presents for eval. pt reports that he does not feel right, thinks that he is withrdawing from alcohol, although reports that he is no longer a daily drink. Admits to daily THC use. Associated nausea w/ vomiting. No hx of withdrawal seizures. No CP, SOB, abd pain, fevers, HA.

## 2024-08-14 NOTE — ED PROVIDER NOTE - NS ED ATTENDING STATEMENT MOD
I have seen and examined this patient and fully participated in the care of this patient as the teaching attending.  The service was shared with the TRACEE.  I reviewed and verified the documentation.

## 2024-08-14 NOTE — ED PROVIDER NOTE - PROGRESS NOTE DETAILS
Patient sxs improved after medications, feeling better. All results d/w patient and family -copies of results provided.  Pt instructed to return if any worsening symptoms or concerns.  They verbalize understanding.  [place] PO, eager for dc

## 2024-08-16 ENCOUNTER — INPATIENT (INPATIENT)
Facility: HOSPITAL | Age: 29
LOS: 0 days | Discharge: LEFT BEFORE DISCHARGE | DRG: 770 | End: 2024-08-17
Attending: INTERNAL MEDICINE | Admitting: INTERNAL MEDICINE
Payer: MEDICAID

## 2024-08-16 VITALS
RESPIRATION RATE: 18 BRPM | WEIGHT: 169.98 LBS | SYSTOLIC BLOOD PRESSURE: 136 MMHG | DIASTOLIC BLOOD PRESSURE: 90 MMHG | HEART RATE: 66 BPM | OXYGEN SATURATION: 97 % | TEMPERATURE: 99 F

## 2024-08-16 DIAGNOSIS — F10.939 ALCOHOL USE, UNSPECIFIED WITH WITHDRAWAL, UNSPECIFIED: ICD-10-CM

## 2024-08-16 DIAGNOSIS — Z90.49 ACQUIRED ABSENCE OF OTHER SPECIFIED PARTS OF DIGESTIVE TRACT: Chronic | ICD-10-CM

## 2024-08-16 LAB
ALBUMIN SERPL ELPH-MCNC: 4.3 G/DL — SIGNIFICANT CHANGE UP (ref 3.5–5.2)
ALBUMIN SERPL ELPH-MCNC: 4.5 G/DL — SIGNIFICANT CHANGE UP (ref 3.5–5.2)
ALP SERPL-CCNC: 75 U/L — SIGNIFICANT CHANGE UP (ref 30–115)
ALP SERPL-CCNC: 83 U/L — SIGNIFICANT CHANGE UP (ref 30–115)
ALT FLD-CCNC: 22 U/L — SIGNIFICANT CHANGE UP (ref 0–41)
ALT FLD-CCNC: 26 U/L — SIGNIFICANT CHANGE UP (ref 0–41)
ANION GAP SERPL CALC-SCNC: 16 MMOL/L — HIGH (ref 7–14)
ANION GAP SERPL CALC-SCNC: 17 MMOL/L — HIGH (ref 7–14)
AST SERPL-CCNC: 21 U/L — SIGNIFICANT CHANGE UP (ref 0–41)
AST SERPL-CCNC: 22 U/L — SIGNIFICANT CHANGE UP (ref 0–41)
BASE EXCESS BLDV CALC-SCNC: 1.6 MMOL/L — SIGNIFICANT CHANGE UP (ref -2–3)
BASOPHILS # BLD AUTO: 0.09 K/UL — SIGNIFICANT CHANGE UP (ref 0–0.2)
BASOPHILS NFR BLD AUTO: 0.5 % — SIGNIFICANT CHANGE UP (ref 0–1)
BILIRUB DIRECT SERPL-MCNC: 0.3 MG/DL — SIGNIFICANT CHANGE UP (ref 0–0.3)
BILIRUB INDIRECT FLD-MCNC: 0.9 MG/DL — SIGNIFICANT CHANGE UP (ref 0.2–1.2)
BILIRUB SERPL-MCNC: 0.5 MG/DL — SIGNIFICANT CHANGE UP (ref 0.2–1.2)
BILIRUB SERPL-MCNC: 1.2 MG/DL — SIGNIFICANT CHANGE UP (ref 0.2–1.2)
BUN SERPL-MCNC: 10 MG/DL — SIGNIFICANT CHANGE UP (ref 10–20)
BUN SERPL-MCNC: 15 MG/DL — SIGNIFICANT CHANGE UP (ref 10–20)
CA-I SERPL-SCNC: 1.14 MMOL/L — LOW (ref 1.15–1.33)
CALCIUM SERPL-MCNC: 9.1 MG/DL — SIGNIFICANT CHANGE UP (ref 8.4–10.5)
CALCIUM SERPL-MCNC: 9.2 MG/DL — SIGNIFICANT CHANGE UP (ref 8.4–10.4)
CHLORIDE SERPL-SCNC: 101 MMOL/L — SIGNIFICANT CHANGE UP (ref 98–110)
CHLORIDE SERPL-SCNC: 102 MMOL/L — SIGNIFICANT CHANGE UP (ref 98–110)
CO2 SERPL-SCNC: 19 MMOL/L — SIGNIFICANT CHANGE UP (ref 17–32)
CO2 SERPL-SCNC: 19 MMOL/L — SIGNIFICANT CHANGE UP (ref 17–32)
CREAT SERPL-MCNC: 0.8 MG/DL — SIGNIFICANT CHANGE UP (ref 0.7–1.5)
CREAT SERPL-MCNC: 0.9 MG/DL — SIGNIFICANT CHANGE UP (ref 0.7–1.5)
EGFR: 120 ML/MIN/1.73M2 — SIGNIFICANT CHANGE UP
EGFR: 120 ML/MIN/1.73M2 — SIGNIFICANT CHANGE UP
EGFR: 124 ML/MIN/1.73M2 — SIGNIFICANT CHANGE UP
EGFR: 124 ML/MIN/1.73M2 — SIGNIFICANT CHANGE UP
EOSINOPHIL # BLD AUTO: 0.14 K/UL — SIGNIFICANT CHANGE UP (ref 0–0.7)
EOSINOPHIL NFR BLD AUTO: 0.8 % — SIGNIFICANT CHANGE UP (ref 0–8)
GAS PNL BLDV: 133 MMOL/L — LOW (ref 136–145)
GAS PNL BLDV: SIGNIFICANT CHANGE UP
GAS PNL BLDV: SIGNIFICANT CHANGE UP
GLUCOSE SERPL-MCNC: 101 MG/DL — HIGH (ref 70–99)
GLUCOSE SERPL-MCNC: 118 MG/DL — HIGH (ref 70–99)
HCO3 BLDV-SCNC: 21 MMOL/L — LOW (ref 22–29)
HCT VFR BLD CALC: 44.5 % — SIGNIFICANT CHANGE UP (ref 42–52)
HCT VFR BLDA CALC: 50 % — SIGNIFICANT CHANGE UP (ref 39–51)
HGB BLD CALC-MCNC: 16.8 G/DL — SIGNIFICANT CHANGE UP (ref 12.6–17.4)
HGB BLD-MCNC: 16 G/DL — SIGNIFICANT CHANGE UP (ref 14–18)
IMM GRANULOCYTES NFR BLD AUTO: 0.6 % — HIGH (ref 0.1–0.3)
LACTATE BLDV-MCNC: 4.4 MMOL/L — CRITICAL HIGH (ref 0.5–2)
LACTATE SERPL-SCNC: 2.4 MMOL/L — HIGH (ref 0.7–2)
LYMPHOCYTES # BLD AUTO: 13.7 % — LOW (ref 20.5–51.1)
LYMPHOCYTES # BLD AUTO: 2.38 K/UL — SIGNIFICANT CHANGE UP (ref 1.2–3.4)
MAGNESIUM SERPL-MCNC: 1.5 MG/DL — LOW (ref 1.8–2.4)
MCHC RBC-ENTMCNC: 30.9 PG — SIGNIFICANT CHANGE UP (ref 27–31)
MCHC RBC-ENTMCNC: 36 G/DL — SIGNIFICANT CHANGE UP (ref 32–37)
MCV RBC AUTO: 85.9 FL — SIGNIFICANT CHANGE UP (ref 80–94)
MONOCYTES # BLD AUTO: 1.37 K/UL — HIGH (ref 0.1–0.6)
MONOCYTES NFR BLD AUTO: 7.9 % — SIGNIFICANT CHANGE UP (ref 1.7–9.3)
NEUTROPHILS # BLD AUTO: 13.31 K/UL — HIGH (ref 1.4–6.5)
NEUTROPHILS NFR BLD AUTO: 76.5 % — HIGH (ref 42.2–75.2)
NRBC # BLD: 0 /100 WBCS — SIGNIFICANT CHANGE UP (ref 0–0)
NRBC BLD-RTO: 0 /100 WBCS — SIGNIFICANT CHANGE UP (ref 0–0)
PCO2 BLDV: 21 MMHG — LOW (ref 42–55)
PH BLDV: 7.6 — CRITICAL HIGH (ref 7.32–7.43)
PHOSPHATE SERPL-MCNC: 1.5 MG/DL — LOW (ref 2.1–4.9)
PLATELET # BLD AUTO: 346 K/UL — SIGNIFICANT CHANGE UP (ref 130–400)
PMV BLD: 9.5 FL — SIGNIFICANT CHANGE UP (ref 7.4–10.4)
PO2 BLDV: 106 MMHG — HIGH (ref 25–45)
POTASSIUM BLDV-SCNC: 3.7 MMOL/L — SIGNIFICANT CHANGE UP (ref 3.5–5.1)
POTASSIUM SERPL-MCNC: 3.4 MMOL/L — LOW (ref 3.5–5)
POTASSIUM SERPL-MCNC: 3.8 MMOL/L — SIGNIFICANT CHANGE UP (ref 3.5–5)
POTASSIUM SERPL-SCNC: 3.4 MMOL/L — LOW (ref 3.5–5)
POTASSIUM SERPL-SCNC: 3.8 MMOL/L — SIGNIFICANT CHANGE UP (ref 3.5–5)
PROT SERPL-MCNC: 6.6 G/DL — SIGNIFICANT CHANGE UP (ref 6–8)
PROT SERPL-MCNC: 6.7 G/DL — SIGNIFICANT CHANGE UP (ref 6–8)
RBC # BLD: 5.18 M/UL — SIGNIFICANT CHANGE UP (ref 4.7–6.1)
RBC # FLD: 12.9 % — SIGNIFICANT CHANGE UP (ref 11.5–14.5)
SAO2 % BLDV: 99 % — HIGH (ref 67–88)
SODIUM SERPL-SCNC: 137 MMOL/L — SIGNIFICANT CHANGE UP (ref 135–146)
SODIUM SERPL-SCNC: 137 MMOL/L — SIGNIFICANT CHANGE UP (ref 135–146)
WBC # BLD: 17.4 K/UL — HIGH (ref 4.8–10.8)
WBC # FLD AUTO: 17.4 K/UL — HIGH (ref 4.8–10.8)

## 2024-08-16 PROCEDURE — 85027 COMPLETE CBC AUTOMATED: CPT

## 2024-08-16 PROCEDURE — 99223 1ST HOSP IP/OBS HIGH 75: CPT

## 2024-08-16 PROCEDURE — 82746 ASSAY OF FOLIC ACID SERUM: CPT

## 2024-08-16 PROCEDURE — 80076 HEPATIC FUNCTION PANEL: CPT

## 2024-08-16 PROCEDURE — 71045 X-RAY EXAM CHEST 1 VIEW: CPT

## 2024-08-16 PROCEDURE — 93005 ELECTROCARDIOGRAM TRACING: CPT

## 2024-08-16 PROCEDURE — 93010 ELECTROCARDIOGRAM REPORT: CPT

## 2024-08-16 PROCEDURE — 83605 ASSAY OF LACTIC ACID: CPT

## 2024-08-16 PROCEDURE — 99285 EMERGENCY DEPT VISIT HI MDM: CPT

## 2024-08-16 PROCEDURE — 80053 COMPREHEN METABOLIC PANEL: CPT

## 2024-08-16 PROCEDURE — 84100 ASSAY OF PHOSPHORUS: CPT

## 2024-08-16 PROCEDURE — 36415 COLL VENOUS BLD VENIPUNCTURE: CPT

## 2024-08-16 PROCEDURE — 80048 BASIC METABOLIC PNL TOTAL CA: CPT

## 2024-08-16 PROCEDURE — 83735 ASSAY OF MAGNESIUM: CPT

## 2024-08-16 PROCEDURE — 87040 BLOOD CULTURE FOR BACTERIA: CPT

## 2024-08-16 RX ORDER — B1/B2/B3/B5/B6/B12/VIT C/FOLIC 500-0.5 MG
1 TABLET ORAL DAILY
Refills: 0 | Status: DISCONTINUED | OUTPATIENT
Start: 2024-08-16 | End: 2024-08-17

## 2024-08-16 RX ORDER — ENOXAPARIN SODIUM 100 MG/ML
40 INJECTION SUBCUTANEOUS EVERY 24 HOURS
Refills: 0 | Status: DISCONTINUED | OUTPATIENT
Start: 2024-08-16 | End: 2024-08-17

## 2024-08-16 RX ORDER — DIAZEPAM 5 MG/1
10 TABLET ORAL ONCE
Refills: 0 | Status: DISCONTINUED | OUTPATIENT
Start: 2024-08-16 | End: 2024-08-16

## 2024-08-16 RX ORDER — ONDANSETRON HCL/PF 4 MG/2 ML
4 VIAL (ML) INJECTION THREE TIMES A DAY
Refills: 0 | Status: DISCONTINUED | OUTPATIENT
Start: 2024-08-16 | End: 2024-08-17

## 2024-08-16 RX ORDER — SODIUM PHOSPHATE,DIBASIC DIHYD
15 POWDER (GRAM) MISCELLANEOUS ONCE
Refills: 0 | Status: COMPLETED | OUTPATIENT
Start: 2024-08-16 | End: 2024-08-16

## 2024-08-16 RX ORDER — SODIUM CHLORIDE 9 G/1000ML
2000 INJECTION, SOLUTION INTRAVENOUS ONCE
Refills: 0 | Status: COMPLETED | OUTPATIENT
Start: 2024-08-16 | End: 2024-08-16

## 2024-08-16 RX ORDER — FOLIC ACID 1 MG/1
1 TABLET ORAL ONCE
Refills: 0 | Status: COMPLETED | OUTPATIENT
Start: 2024-08-16 | End: 2024-08-16

## 2024-08-16 RX ORDER — SODIUM CHLORIDE 9 G/1000ML
1000 INJECTION, SOLUTION INTRAVENOUS
Refills: 0 | Status: COMPLETED | OUTPATIENT
Start: 2024-08-16 | End: 2024-08-17

## 2024-08-16 RX ORDER — MAGNESIUM SULFATE 500 MG/ML
2 SYRINGE (ML) INJECTION
Refills: 0 | Status: COMPLETED | OUTPATIENT
Start: 2024-08-16 | End: 2024-08-17

## 2024-08-16 RX ORDER — FOLIC ACID 1 MG/1
1 TABLET ORAL DAILY
Refills: 0 | Status: DISCONTINUED | OUTPATIENT
Start: 2024-08-16 | End: 2024-08-17

## 2024-08-16 RX ORDER — LORAZEPAM 4 MG/ML
2 VIAL (ML) INJECTION
Refills: 0 | Status: DISCONTINUED | OUTPATIENT
Start: 2024-08-16 | End: 2024-08-17

## 2024-08-16 RX ORDER — FOLIC ACID 1 MG/1
1 TABLET ORAL DAILY
Refills: 0 | Status: DISCONTINUED | OUTPATIENT
Start: 2024-08-16 | End: 2024-08-16

## 2024-08-16 RX ORDER — ONDANSETRON HCL/PF 4 MG/2 ML
4 VIAL (ML) INJECTION ONCE
Refills: 0 | Status: COMPLETED | OUTPATIENT
Start: 2024-08-16 | End: 2024-08-16

## 2024-08-16 RX ADMIN — SODIUM CHLORIDE 75 MILLILITER(S): 9 INJECTION, SOLUTION INTRAVENOUS at 12:12

## 2024-08-16 RX ADMIN — Medication 4 MILLIGRAM(S): at 12:04

## 2024-08-16 RX ADMIN — Medication 40 MILLIGRAM(S): at 23:14

## 2024-08-16 RX ADMIN — Medication 4 MILLIGRAM(S): at 08:38

## 2024-08-16 RX ADMIN — SODIUM CHLORIDE 2000 MILLILITER(S): 9 INJECTION, SOLUTION INTRAVENOUS at 08:41

## 2024-08-16 RX ADMIN — Medication 40 MILLIEQUIVALENT(S): at 23:14

## 2024-08-16 RX ADMIN — Medication 2 MILLIGRAM(S): at 15:18

## 2024-08-16 RX ADMIN — Medication 2 MILLIGRAM(S): at 11:01

## 2024-08-16 RX ADMIN — Medication 103 MILLIGRAM(S): at 10:20

## 2024-08-16 RX ADMIN — Medication 2 MILLIGRAM(S): at 17:53

## 2024-08-16 RX ADMIN — Medication 2 MILLIGRAM(S): at 12:04

## 2024-08-16 RX ADMIN — Medication 25 GRAM(S): at 23:00

## 2024-08-16 RX ADMIN — FOLIC ACID 1 MILLIGRAM(S): 1 TABLET ORAL at 10:20

## 2024-08-16 RX ADMIN — Medication 2 MILLIGRAM(S): at 23:19

## 2024-08-16 RX ADMIN — DIAZEPAM 10 MILLIGRAM(S): 5 TABLET ORAL at 08:38

## 2024-08-17 VITALS
SYSTOLIC BLOOD PRESSURE: 132 MMHG | DIASTOLIC BLOOD PRESSURE: 70 MMHG | OXYGEN SATURATION: 96 % | TEMPERATURE: 98 F | HEART RATE: 73 BPM

## 2024-08-17 LAB
ALBUMIN SERPL ELPH-MCNC: 4.3 G/DL — SIGNIFICANT CHANGE UP (ref 3.5–5.2)
ALP SERPL-CCNC: 75 U/L — SIGNIFICANT CHANGE UP (ref 30–115)
ALT FLD-CCNC: 21 U/L — SIGNIFICANT CHANGE UP (ref 0–41)
ANION GAP SERPL CALC-SCNC: 14 MMOL/L — SIGNIFICANT CHANGE UP (ref 7–14)
AST SERPL-CCNC: 22 U/L — SIGNIFICANT CHANGE UP (ref 0–41)
BILIRUB SERPL-MCNC: 1.1 MG/DL — SIGNIFICANT CHANGE UP (ref 0.2–1.2)
BUN SERPL-MCNC: 7 MG/DL — LOW (ref 10–20)
CALCIUM SERPL-MCNC: 8.7 MG/DL — SIGNIFICANT CHANGE UP (ref 8.4–10.5)
CHLORIDE SERPL-SCNC: 101 MMOL/L — SIGNIFICANT CHANGE UP (ref 98–110)
CO2 SERPL-SCNC: 21 MMOL/L — SIGNIFICANT CHANGE UP (ref 17–32)
CREAT SERPL-MCNC: 0.8 MG/DL — SIGNIFICANT CHANGE UP (ref 0.7–1.5)
EGFR: 124 ML/MIN/1.73M2 — SIGNIFICANT CHANGE UP
EGFR: 124 ML/MIN/1.73M2 — SIGNIFICANT CHANGE UP
GLUCOSE SERPL-MCNC: 89 MG/DL — SIGNIFICANT CHANGE UP (ref 70–99)
HCT VFR BLD CALC: 44.4 % — SIGNIFICANT CHANGE UP (ref 42–52)
HGB BLD-MCNC: 15.8 G/DL — SIGNIFICANT CHANGE UP (ref 14–18)
LACTATE SERPL-SCNC: 1 MMOL/L — SIGNIFICANT CHANGE UP (ref 0.7–2)
MAGNESIUM SERPL-MCNC: 3.3 MG/DL — CRITICAL HIGH (ref 1.8–2.4)
MCHC RBC-ENTMCNC: 30.4 PG — SIGNIFICANT CHANGE UP (ref 27–31)
MCHC RBC-ENTMCNC: 35.6 G/DL — SIGNIFICANT CHANGE UP (ref 32–37)
MCV RBC AUTO: 85.5 FL — SIGNIFICANT CHANGE UP (ref 80–94)
NRBC # BLD: 0 /100 WBCS — SIGNIFICANT CHANGE UP (ref 0–0)
NRBC BLD-RTO: 0 /100 WBCS — SIGNIFICANT CHANGE UP (ref 0–0)
PHOSPHATE SERPL-MCNC: 3.8 MG/DL — SIGNIFICANT CHANGE UP (ref 2.1–4.9)
PLATELET # BLD AUTO: 310 K/UL — SIGNIFICANT CHANGE UP (ref 130–400)
PMV BLD: 9.5 FL — SIGNIFICANT CHANGE UP (ref 7.4–10.4)
POTASSIUM SERPL-MCNC: 3.3 MMOL/L — LOW (ref 3.5–5)
POTASSIUM SERPL-SCNC: 3.3 MMOL/L — LOW (ref 3.5–5)
PROT SERPL-MCNC: 6.7 G/DL — SIGNIFICANT CHANGE UP (ref 6–8)
RBC # BLD: 5.19 M/UL — SIGNIFICANT CHANGE UP (ref 4.7–6.1)
RBC # FLD: 12.7 % — SIGNIFICANT CHANGE UP (ref 11.5–14.5)
SODIUM SERPL-SCNC: 136 MMOL/L — SIGNIFICANT CHANGE UP (ref 135–146)
WBC # BLD: 11.82 K/UL — HIGH (ref 4.8–10.8)
WBC # FLD AUTO: 11.82 K/UL — HIGH (ref 4.8–10.8)

## 2024-08-17 PROCEDURE — 71045 X-RAY EXAM CHEST 1 VIEW: CPT | Mod: 26

## 2024-08-17 PROCEDURE — 99233 SBSQ HOSP IP/OBS HIGH 50: CPT

## 2024-08-17 PROCEDURE — 99239 HOSP IP/OBS DSCHRG MGMT >30: CPT

## 2024-08-17 RX ORDER — B1/B2/B3/B5/B6/B12/VIT C/FOLIC 500-0.5 MG
1 TABLET ORAL
Qty: 30 | Refills: 1
Start: 2024-08-17 | End: 2024-10-15

## 2024-08-17 RX ORDER — FOLIC ACID 1 MG/1
1 TABLET ORAL
Qty: 30 | Refills: 1
Start: 2024-08-17 | End: 2024-10-15

## 2024-08-17 RX ADMIN — ENOXAPARIN SODIUM 40 MILLIGRAM(S): 100 INJECTION SUBCUTANEOUS at 12:14

## 2024-08-17 RX ADMIN — Medication 63.75 MILLIMOLE(S): at 00:07

## 2024-08-17 RX ADMIN — SODIUM CHLORIDE 75 MILLILITER(S): 9 INJECTION, SOLUTION INTRAVENOUS at 12:10

## 2024-08-17 RX ADMIN — Medication 25 GRAM(S): at 06:51

## 2024-08-17 RX ADMIN — Medication 1 TABLET(S): at 12:08

## 2024-08-17 RX ADMIN — Medication 25 GRAM(S): at 02:46

## 2024-08-17 RX ADMIN — Medication 100 MILLIGRAM(S): at 12:08

## 2024-08-17 RX ADMIN — FOLIC ACID 1 MILLIGRAM(S): 1 TABLET ORAL at 12:08

## 2024-08-17 RX ADMIN — Medication 40 MILLIGRAM(S): at 06:52

## 2024-08-18 LAB — FOLATE SERPL-MCNC: 11.6 NG/ML — SIGNIFICANT CHANGE UP

## 2024-08-22 LAB
CULTURE RESULTS: SIGNIFICANT CHANGE UP
SPECIMEN SOURCE: SIGNIFICANT CHANGE UP

## 2024-08-25 DIAGNOSIS — E83.42 HYPOMAGNESEMIA: ICD-10-CM

## 2024-08-25 DIAGNOSIS — F10.139 ALCOHOL ABUSE WITH WITHDRAWAL, UNSPECIFIED: ICD-10-CM

## 2024-08-25 DIAGNOSIS — Z91.013 ALLERGY TO SEAFOOD: ICD-10-CM

## 2024-08-25 DIAGNOSIS — Z53.29 PROCEDURE AND TREATMENT NOT CARRIED OUT BECAUSE OF PATIENT'S DECISION FOR OTHER REASONS: ICD-10-CM

## 2024-08-25 DIAGNOSIS — E83.39 OTHER DISORDERS OF PHOSPHORUS METABOLISM: ICD-10-CM

## 2024-08-25 DIAGNOSIS — F12.90 CANNABIS USE, UNSPECIFIED, UNCOMPLICATED: ICD-10-CM

## 2024-08-25 DIAGNOSIS — R11.2 NAUSEA WITH VOMITING, UNSPECIFIED: ICD-10-CM

## 2024-08-25 DIAGNOSIS — E51.9 THIAMINE DEFICIENCY, UNSPECIFIED: ICD-10-CM

## 2024-08-25 DIAGNOSIS — E53.8 DEFICIENCY OF OTHER SPECIFIED B GROUP VITAMINS: ICD-10-CM

## 2024-08-25 DIAGNOSIS — K29.20 ALCOHOLIC GASTRITIS WITHOUT BLEEDING: ICD-10-CM

## 2024-08-25 DIAGNOSIS — D72.829 ELEVATED WHITE BLOOD CELL COUNT, UNSPECIFIED: ICD-10-CM

## 2024-12-20 ENCOUNTER — EMERGENCY (EMERGENCY)
Facility: HOSPITAL | Age: 29
LOS: 0 days | Discharge: HOME | End: 2024-12-20
Attending: STUDENT IN AN ORGANIZED HEALTH CARE EDUCATION/TRAINING PROGRAM
Payer: MEDICAID

## 2024-12-20 VITALS
HEART RATE: 120 BPM | OXYGEN SATURATION: 98 % | DIASTOLIC BLOOD PRESSURE: 86 MMHG | SYSTOLIC BLOOD PRESSURE: 134 MMHG | TEMPERATURE: 98 F | RESPIRATION RATE: 18 BRPM

## 2024-12-20 VITALS
HEART RATE: 68 BPM | RESPIRATION RATE: 18 BRPM | TEMPERATURE: 97 F | OXYGEN SATURATION: 98 % | SYSTOLIC BLOOD PRESSURE: 116 MMHG | DIASTOLIC BLOOD PRESSURE: 79 MMHG

## 2024-12-20 DIAGNOSIS — Z90.49 ACQUIRED ABSENCE OF OTHER SPECIFIED PARTS OF DIGESTIVE TRACT: Chronic | ICD-10-CM

## 2024-12-20 LAB
ALBUMIN SERPL ELPH-MCNC: 5 G/DL — SIGNIFICANT CHANGE UP (ref 3.5–5.2)
ALP SERPL-CCNC: 105 U/L — SIGNIFICANT CHANGE UP (ref 30–115)
ALT FLD-CCNC: 32 U/L — SIGNIFICANT CHANGE UP (ref 0–41)
ANION GAP SERPL CALC-SCNC: 12 MMOL/L — SIGNIFICANT CHANGE UP (ref 7–14)
ANION GAP SERPL CALC-SCNC: 20 MMOL/L — HIGH (ref 7–14)
AST SERPL-CCNC: 25 U/L — SIGNIFICANT CHANGE UP (ref 0–41)
BASOPHILS # BLD AUTO: 0.06 K/UL — SIGNIFICANT CHANGE UP (ref 0–0.2)
BASOPHILS # BLD AUTO: 0.11 K/UL — SIGNIFICANT CHANGE UP (ref 0–0.2)
BASOPHILS NFR BLD AUTO: 0.3 % — SIGNIFICANT CHANGE UP (ref 0–1)
BASOPHILS NFR BLD AUTO: 0.5 % — SIGNIFICANT CHANGE UP (ref 0–1)
BILIRUB SERPL-MCNC: 0.7 MG/DL — SIGNIFICANT CHANGE UP (ref 0.2–1.2)
BUN SERPL-MCNC: 17 MG/DL — SIGNIFICANT CHANGE UP (ref 10–20)
BUN SERPL-MCNC: 17 MG/DL — SIGNIFICANT CHANGE UP (ref 10–20)
CALCIUM SERPL-MCNC: 10.5 MG/DL — SIGNIFICANT CHANGE UP (ref 8.4–10.5)
CALCIUM SERPL-MCNC: 9.2 MG/DL — SIGNIFICANT CHANGE UP (ref 8.4–10.5)
CHLORIDE SERPL-SCNC: 102 MMOL/L — SIGNIFICANT CHANGE UP (ref 98–110)
CHLORIDE SERPL-SCNC: 107 MMOL/L — SIGNIFICANT CHANGE UP (ref 98–110)
CO2 SERPL-SCNC: 16 MMOL/L — LOW (ref 17–32)
CO2 SERPL-SCNC: 21 MMOL/L — SIGNIFICANT CHANGE UP (ref 17–32)
CREAT SERPL-MCNC: 0.9 MG/DL — SIGNIFICANT CHANGE UP (ref 0.7–1.5)
CREAT SERPL-MCNC: 1 MG/DL — SIGNIFICANT CHANGE UP (ref 0.7–1.5)
EGFR: 106 ML/MIN/1.73M2 — SIGNIFICANT CHANGE UP
EGFR: 120 ML/MIN/1.73M2 — SIGNIFICANT CHANGE UP
EOSINOPHIL # BLD AUTO: 0 K/UL — SIGNIFICANT CHANGE UP (ref 0–0.7)
EOSINOPHIL # BLD AUTO: 0 K/UL — SIGNIFICANT CHANGE UP (ref 0–0.7)
EOSINOPHIL NFR BLD AUTO: 0 % — SIGNIFICANT CHANGE UP (ref 0–8)
EOSINOPHIL NFR BLD AUTO: 0 % — SIGNIFICANT CHANGE UP (ref 0–8)
GAS PNL BLDV: SIGNIFICANT CHANGE UP
GLUCOSE SERPL-MCNC: 127 MG/DL — HIGH (ref 70–99)
GLUCOSE SERPL-MCNC: 150 MG/DL — HIGH (ref 70–99)
HCT VFR BLD CALC: 47.3 % — SIGNIFICANT CHANGE UP (ref 42–52)
HCT VFR BLD CALC: 51.1 % — SIGNIFICANT CHANGE UP (ref 42–52)
HGB BLD-MCNC: 16.9 G/DL — SIGNIFICANT CHANGE UP (ref 14–18)
HGB BLD-MCNC: 18.8 G/DL — HIGH (ref 14–18)
IMM GRANULOCYTES NFR BLD AUTO: 0.5 % — HIGH (ref 0.1–0.3)
IMM GRANULOCYTES NFR BLD AUTO: 0.6 % — HIGH (ref 0.1–0.3)
LIDOCAIN IGE QN: 16 U/L — SIGNIFICANT CHANGE UP (ref 7–60)
LYMPHOCYTES # BLD AUTO: 1.06 K/UL — LOW (ref 1.2–3.4)
LYMPHOCYTES # BLD AUTO: 1.26 K/UL — SIGNIFICANT CHANGE UP (ref 1.2–3.4)
LYMPHOCYTES # BLD AUTO: 4.7 % — LOW (ref 20.5–51.1)
LYMPHOCYTES # BLD AUTO: 5.3 % — LOW (ref 20.5–51.1)
MCHC RBC-ENTMCNC: 30.6 PG — SIGNIFICANT CHANGE UP (ref 27–31)
MCHC RBC-ENTMCNC: 30.7 PG — SIGNIFICANT CHANGE UP (ref 27–31)
MCHC RBC-ENTMCNC: 35.7 G/DL — SIGNIFICANT CHANGE UP (ref 32–37)
MCHC RBC-ENTMCNC: 36.8 G/DL — SIGNIFICANT CHANGE UP (ref 32–37)
MCV RBC AUTO: 83.4 FL — SIGNIFICANT CHANGE UP (ref 80–94)
MCV RBC AUTO: 85.5 FL — SIGNIFICANT CHANGE UP (ref 80–94)
MONOCYTES # BLD AUTO: 1.17 K/UL — HIGH (ref 0.1–0.6)
MONOCYTES # BLD AUTO: 1.54 K/UL — HIGH (ref 0.1–0.6)
MONOCYTES NFR BLD AUTO: 5.2 % — SIGNIFICANT CHANGE UP (ref 1.7–9.3)
MONOCYTES NFR BLD AUTO: 6.4 % — SIGNIFICANT CHANGE UP (ref 1.7–9.3)
NEUTROPHILS # BLD AUTO: 20.04 K/UL — HIGH (ref 1.4–6.5)
NEUTROPHILS # BLD AUTO: 20.86 K/UL — HIGH (ref 1.4–6.5)
NEUTROPHILS NFR BLD AUTO: 87.2 % — HIGH (ref 42.2–75.2)
NEUTROPHILS NFR BLD AUTO: 89.3 % — HIGH (ref 42.2–75.2)
NRBC # BLD: 0 /100 WBCS — SIGNIFICANT CHANGE UP (ref 0–0)
NRBC # BLD: 0 /100 WBCS — SIGNIFICANT CHANGE UP (ref 0–0)
PLATELET # BLD AUTO: 346 K/UL — SIGNIFICANT CHANGE UP (ref 130–400)
PLATELET # BLD AUTO: 394 K/UL — SIGNIFICANT CHANGE UP (ref 130–400)
PMV BLD: 9.4 FL — SIGNIFICANT CHANGE UP (ref 7.4–10.4)
PMV BLD: 9.7 FL — SIGNIFICANT CHANGE UP (ref 7.4–10.4)
POTASSIUM SERPL-MCNC: 4.1 MMOL/L — SIGNIFICANT CHANGE UP (ref 3.5–5)
POTASSIUM SERPL-MCNC: 4.4 MMOL/L — SIGNIFICANT CHANGE UP (ref 3.5–5)
POTASSIUM SERPL-SCNC: 4.1 MMOL/L — SIGNIFICANT CHANGE UP (ref 3.5–5)
POTASSIUM SERPL-SCNC: 4.4 MMOL/L — SIGNIFICANT CHANGE UP (ref 3.5–5)
PROT SERPL-MCNC: 8.1 G/DL — HIGH (ref 6–8)
RBC # BLD: 5.53 M/UL — SIGNIFICANT CHANGE UP (ref 4.7–6.1)
RBC # BLD: 6.13 M/UL — HIGH (ref 4.7–6.1)
RBC # FLD: 12.8 % — SIGNIFICANT CHANGE UP (ref 11.5–14.5)
RBC # FLD: 12.8 % — SIGNIFICANT CHANGE UP (ref 11.5–14.5)
SODIUM SERPL-SCNC: 138 MMOL/L — SIGNIFICANT CHANGE UP (ref 135–146)
SODIUM SERPL-SCNC: 140 MMOL/L — SIGNIFICANT CHANGE UP (ref 135–146)
WBC # BLD: 22.44 K/UL — HIGH (ref 4.8–10.8)
WBC # BLD: 23.92 K/UL — HIGH (ref 4.8–10.8)
WBC # FLD AUTO: 22.44 K/UL — HIGH (ref 4.8–10.8)
WBC # FLD AUTO: 23.92 K/UL — HIGH (ref 4.8–10.8)

## 2024-12-20 PROCEDURE — 82330 ASSAY OF CALCIUM: CPT

## 2024-12-20 PROCEDURE — 83690 ASSAY OF LIPASE: CPT

## 2024-12-20 PROCEDURE — 82803 BLOOD GASES ANY COMBINATION: CPT

## 2024-12-20 PROCEDURE — 71045 X-RAY EXAM CHEST 1 VIEW: CPT | Mod: 26

## 2024-12-20 PROCEDURE — 93010 ELECTROCARDIOGRAM REPORT: CPT

## 2024-12-20 PROCEDURE — 71045 X-RAY EXAM CHEST 1 VIEW: CPT

## 2024-12-20 PROCEDURE — 85014 HEMATOCRIT: CPT

## 2024-12-20 PROCEDURE — 82962 GLUCOSE BLOOD TEST: CPT

## 2024-12-20 PROCEDURE — 93005 ELECTROCARDIOGRAM TRACING: CPT

## 2024-12-20 PROCEDURE — 99285 EMERGENCY DEPT VISIT HI MDM: CPT | Mod: 25

## 2024-12-20 PROCEDURE — 80053 COMPREHEN METABOLIC PANEL: CPT

## 2024-12-20 PROCEDURE — 96374 THER/PROPH/DIAG INJ IV PUSH: CPT

## 2024-12-20 PROCEDURE — 85025 COMPLETE CBC W/AUTO DIFF WBC: CPT

## 2024-12-20 PROCEDURE — 80048 BASIC METABOLIC PNL TOTAL CA: CPT

## 2024-12-20 PROCEDURE — 83605 ASSAY OF LACTIC ACID: CPT

## 2024-12-20 PROCEDURE — 85018 HEMOGLOBIN: CPT

## 2024-12-20 PROCEDURE — 36415 COLL VENOUS BLD VENIPUNCTURE: CPT

## 2024-12-20 PROCEDURE — 99285 EMERGENCY DEPT VISIT HI MDM: CPT

## 2024-12-20 PROCEDURE — 96375 TX/PRO/DX INJ NEW DRUG ADDON: CPT

## 2024-12-20 PROCEDURE — 74177 CT ABD & PELVIS W/CONTRAST: CPT | Mod: MC

## 2024-12-20 PROCEDURE — 84132 ASSAY OF SERUM POTASSIUM: CPT

## 2024-12-20 PROCEDURE — 96372 THER/PROPH/DIAG INJ SC/IM: CPT | Mod: XU

## 2024-12-20 PROCEDURE — 84295 ASSAY OF SERUM SODIUM: CPT

## 2024-12-20 PROCEDURE — 74177 CT ABD & PELVIS W/CONTRAST: CPT | Mod: 26,MC

## 2024-12-20 RX ORDER — ONDANSETRON HYDROCHLORIDE 4 MG/1
4 TABLET, FILM COATED ORAL ONCE
Refills: 0 | Status: COMPLETED | OUTPATIENT
Start: 2024-12-20 | End: 2024-12-20

## 2024-12-20 RX ORDER — HALOPERIDOL 2 MG
5 TABLET ORAL ONCE
Refills: 0 | Status: COMPLETED | OUTPATIENT
Start: 2024-12-20 | End: 2024-12-20

## 2024-12-20 RX ORDER — AMOXICILLIN/POTASSIUM CLAV 250-125 MG
875 TABLET ORAL
Qty: 20 | Refills: 0
Start: 2024-12-20 | End: 2024-12-29

## 2024-12-20 RX ORDER — KETOROLAC TROMETHAMINE 30 MG/ML
15 INJECTION INTRAMUSCULAR; INTRAVENOUS ONCE
Refills: 0 | Status: DISCONTINUED | OUTPATIENT
Start: 2024-12-20 | End: 2024-12-20

## 2024-12-20 RX ORDER — SODIUM CHLORIDE 9 MG/ML
1000 INJECTION, SOLUTION INTRAMUSCULAR; INTRAVENOUS; SUBCUTANEOUS ONCE
Refills: 0 | Status: COMPLETED | OUTPATIENT
Start: 2024-12-20 | End: 2024-12-20

## 2024-12-20 RX ORDER — 0.9 % SODIUM CHLORIDE 0.9 %
1000 INTRAVENOUS SOLUTION INTRAVENOUS ONCE
Refills: 0 | Status: COMPLETED | OUTPATIENT
Start: 2024-12-20 | End: 2024-12-20

## 2024-12-20 RX ADMIN — SODIUM CHLORIDE 1000 MILLILITER(S): 9 INJECTION, SOLUTION INTRAMUSCULAR; INTRAVENOUS; SUBCUTANEOUS at 02:30

## 2024-12-20 RX ADMIN — Medication 1000 MILLILITER(S): at 04:32

## 2024-12-20 RX ADMIN — Medication 5 MILLIGRAM(S): at 02:29

## 2024-12-20 RX ADMIN — KETOROLAC TROMETHAMINE 15 MILLIGRAM(S): 30 INJECTION INTRAMUSCULAR; INTRAVENOUS at 02:29

## 2024-12-20 RX ADMIN — ONDANSETRON HYDROCHLORIDE 4 MILLIGRAM(S): 4 TABLET, FILM COATED ORAL at 02:29

## 2024-12-20 NOTE — ED PROVIDER NOTE - CLINICAL SUMMARY MEDICAL DECISION MAKING FREE TEXT BOX
29-year-old male with history as documented presenting today with vomiting and diarrhea shortly after eating Chinese food.  Endorses abdominal discomfort.  Denies chest pain or shortness of breath.  Denies fevers.  On exam patient diaphoretic, diffusely tender initially.  Labs ordered and reviewed with leukocytosis, elevated anion gap as well as low bicarb initially.  Patient given IV fluids with repeat CBC and BMP with the resolution in low bicarb as well as anion gap.  Patient's white count improved as well.  CT abdomen pelvis performed with possible colitis.  Patient's symptoms resolved completely, tolerated oral intake in the ED.  Will manage empirically with oral antibiotics however his symptoms most likely related to either foodborne illness versus viral gastroenteritis.

## 2024-12-20 NOTE — ED PROVIDER NOTE - OBJECTIVE STATEMENT
29-year-old male past medical history of asthma presenting with vomiting since 10 PM tonight.  She reports she had Chinese food and shortly after began vomiting.  Patient states he is a daily marijuana smoker, last smoked 2 hours prior to ED arrival.  Patient also endorses drinking alcohol but has not done so in the past month.  Denies any fever with endorsing diaphoresis and chills.  No headache, dizziness, and is, chest pain, shortness of breath, diarrhea, constipation,  symptoms.  Patient reports feeling anxious and lightheaded.

## 2024-12-20 NOTE — ED PROVIDER NOTE - PROGRESS NOTE DETAILS
Patient reevaluated reports symptoms improved.  Patient states he no longer is abdominal pain no longer nauseous. Abdomen nontender on exam Patient reevaluated, able to tolerate p.o.  Patient ready for discharge.

## 2024-12-20 NOTE — ED PROVIDER NOTE - PHYSICAL EXAMINATION
Constitutional: Well developed, well nourished, no acute distress  Head: Normocephalic, Atraumatic  Eyes: PERRLA, EOMI, conjunctiva and sclera WNL  ENT: Moist mucous membranes, no rhinorrhea,    Neck: Supple, Nontender,   Respiratory: Normal chest excursion with respiration; Breath sounds clear and equal B/L; No wheezes, rales, or rhonchi   Cardiovascular: RRR; Normal S1, S2; No murmurs, rubs or gallops   ABD/GI: Vomiting,  Nondistended; Nontender; No guarding, rigidity or rebound;    EXT/MS: Moving all extremities; Distal pulses 2+ B/L   Skin: Diaphoretic  Neurologic: AAO x 4;  Normal motor and sensory function  Psychiatric: Appropriate affect, normal mood

## 2024-12-20 NOTE — ED ADULT TRIAGE NOTE - CHIEF COMPLAINT QUOTE
BIBEMS for possible food poisoning from chinese food. BIBEMS for possible food poisoning from chinese food. Vomiting x2 hours.

## 2024-12-20 NOTE — ED PROVIDER NOTE - PATIENT PORTAL LINK FT
You can access the FollowMyHealth Patient Portal offered by [facility] by registering at the following website: [email or web address]. By joining [facility]’s FollowMyHealth portal, you will also be able to view your health information using other applications (apps) compatible with our system.

## 2024-12-20 NOTE — ED PROVIDER NOTE - CARE PLAN
Principal Discharge DX:	Nausea & vomiting  Secondary Diagnosis:	Abdominal pain  Secondary Diagnosis:	Cyclic vomiting syndrome   1

## 2024-12-21 ENCOUNTER — INPATIENT (INPATIENT)
Facility: HOSPITAL | Age: 29
LOS: 1 days | Discharge: HOME | DRG: 720 | End: 2024-12-23
Attending: INTERNAL MEDICINE | Admitting: INTERNAL MEDICINE
Payer: MEDICAID

## 2024-12-21 VITALS
WEIGHT: 169.98 LBS | DIASTOLIC BLOOD PRESSURE: 80 MMHG | HEART RATE: 109 BPM | RESPIRATION RATE: 20 BRPM | SYSTOLIC BLOOD PRESSURE: 128 MMHG | TEMPERATURE: 97 F

## 2024-12-21 DIAGNOSIS — Z90.49 ACQUIRED ABSENCE OF OTHER SPECIFIED PARTS OF DIGESTIVE TRACT: Chronic | ICD-10-CM

## 2024-12-21 DIAGNOSIS — A41.9 SEPSIS, UNSPECIFIED ORGANISM: ICD-10-CM

## 2024-12-21 LAB
ALBUMIN SERPL ELPH-MCNC: 4.2 G/DL — SIGNIFICANT CHANGE UP (ref 3.5–5.2)
ALBUMIN SERPL ELPH-MCNC: 4.6 G/DL — SIGNIFICANT CHANGE UP (ref 3.5–5.2)
ALP SERPL-CCNC: 73 U/L — SIGNIFICANT CHANGE UP (ref 30–115)
ALP SERPL-CCNC: 85 U/L — SIGNIFICANT CHANGE UP (ref 30–115)
ALT FLD-CCNC: 22 U/L — SIGNIFICANT CHANGE UP (ref 0–41)
ALT FLD-CCNC: 27 U/L — SIGNIFICANT CHANGE UP (ref 0–41)
ANION GAP SERPL CALC-SCNC: 13 MMOL/L — SIGNIFICANT CHANGE UP (ref 7–14)
ANION GAP SERPL CALC-SCNC: 17 MMOL/L — HIGH (ref 7–14)
APPEARANCE UR: ABNORMAL
APTT BLD: 26.2 SEC — LOW (ref 27–39.2)
AST SERPL-CCNC: 31 U/L — SIGNIFICANT CHANGE UP (ref 0–41)
AST SERPL-CCNC: 35 U/L — SIGNIFICANT CHANGE UP (ref 0–41)
BASOPHILS # BLD AUTO: 0.04 K/UL — SIGNIFICANT CHANGE UP (ref 0–0.2)
BASOPHILS NFR BLD AUTO: 0.3 % — SIGNIFICANT CHANGE UP (ref 0–1)
BILIRUB DIRECT SERPL-MCNC: 0.3 MG/DL — SIGNIFICANT CHANGE UP (ref 0–0.3)
BILIRUB INDIRECT FLD-MCNC: 0.8 MG/DL — SIGNIFICANT CHANGE UP (ref 0.2–1.2)
BILIRUB SERPL-MCNC: 1.1 MG/DL — SIGNIFICANT CHANGE UP (ref 0.2–1.2)
BILIRUB SERPL-MCNC: 1.1 MG/DL — SIGNIFICANT CHANGE UP (ref 0.2–1.2)
BILIRUB UR-MCNC: NEGATIVE — SIGNIFICANT CHANGE UP
BUN SERPL-MCNC: 13 MG/DL — SIGNIFICANT CHANGE UP (ref 10–20)
BUN SERPL-MCNC: 14 MG/DL — SIGNIFICANT CHANGE UP (ref 10–20)
CALCIUM SERPL-MCNC: 9.1 MG/DL — SIGNIFICANT CHANGE UP (ref 8.4–10.5)
CALCIUM SERPL-MCNC: 9.8 MG/DL — SIGNIFICANT CHANGE UP (ref 8.4–10.5)
CHLORIDE SERPL-SCNC: 101 MMOL/L — SIGNIFICANT CHANGE UP (ref 98–110)
CHLORIDE SERPL-SCNC: 99 MMOL/L — SIGNIFICANT CHANGE UP (ref 98–110)
CO2 SERPL-SCNC: 20 MMOL/L — SIGNIFICANT CHANGE UP (ref 17–32)
CO2 SERPL-SCNC: 22 MMOL/L — SIGNIFICANT CHANGE UP (ref 17–32)
COLOR SPEC: SIGNIFICANT CHANGE UP
CREAT SERPL-MCNC: 0.8 MG/DL — SIGNIFICANT CHANGE UP (ref 0.7–1.5)
CREAT SERPL-MCNC: 0.9 MG/DL — SIGNIFICANT CHANGE UP (ref 0.7–1.5)
DIFF PNL FLD: NEGATIVE — SIGNIFICANT CHANGE UP
EGFR: 120 ML/MIN/1.73M2 — SIGNIFICANT CHANGE UP
EGFR: 124 ML/MIN/1.73M2 — SIGNIFICANT CHANGE UP
EOSINOPHIL # BLD AUTO: 0 K/UL — SIGNIFICANT CHANGE UP (ref 0–0.7)
EOSINOPHIL NFR BLD AUTO: 0 % — SIGNIFICANT CHANGE UP (ref 0–8)
ETHANOL SERPL-MCNC: <10 MG/DL — SIGNIFICANT CHANGE UP
GLUCOSE SERPL-MCNC: 101 MG/DL — HIGH (ref 70–99)
GLUCOSE SERPL-MCNC: 88 MG/DL — SIGNIFICANT CHANGE UP (ref 70–99)
GLUCOSE UR QL: NEGATIVE MG/DL — SIGNIFICANT CHANGE UP
HCT VFR BLD CALC: 45 % — SIGNIFICANT CHANGE UP (ref 42–52)
HGB BLD-MCNC: 16.5 G/DL — SIGNIFICANT CHANGE UP (ref 14–18)
IMM GRANULOCYTES NFR BLD AUTO: 0.5 % — HIGH (ref 0.1–0.3)
INR BLD: 1.03 RATIO — SIGNIFICANT CHANGE UP (ref 0.65–1.3)
KETONES UR-MCNC: 80 MG/DL
LACTATE SERPL-SCNC: 1.6 MMOL/L — SIGNIFICANT CHANGE UP (ref 0.7–2)
LACTATE SERPL-SCNC: 1.8 MMOL/L — SIGNIFICANT CHANGE UP (ref 0.7–2)
LACTATE SERPL-SCNC: 2.9 MMOL/L — HIGH (ref 0.7–2)
LEUKOCYTE ESTERASE UR-ACNC: NEGATIVE — SIGNIFICANT CHANGE UP
LYMPHOCYTES # BLD AUTO: 1.65 K/UL — SIGNIFICANT CHANGE UP (ref 1.2–3.4)
LYMPHOCYTES # BLD AUTO: 10.4 % — LOW (ref 20.5–51.1)
MAGNESIUM SERPL-MCNC: 1.9 MG/DL — SIGNIFICANT CHANGE UP (ref 1.8–2.4)
MCHC RBC-ENTMCNC: 30.5 PG — SIGNIFICANT CHANGE UP (ref 27–31)
MCHC RBC-ENTMCNC: 36.7 G/DL — SIGNIFICANT CHANGE UP (ref 32–37)
MCV RBC AUTO: 83.2 FL — SIGNIFICANT CHANGE UP (ref 80–94)
MONOCYTES # BLD AUTO: 1.14 K/UL — HIGH (ref 0.1–0.6)
MONOCYTES NFR BLD AUTO: 7.2 % — SIGNIFICANT CHANGE UP (ref 1.7–9.3)
NEUTROPHILS # BLD AUTO: 13.01 K/UL — HIGH (ref 1.4–6.5)
NEUTROPHILS NFR BLD AUTO: 81.6 % — HIGH (ref 42.2–75.2)
NITRITE UR-MCNC: NEGATIVE — SIGNIFICANT CHANGE UP
NRBC # BLD: 0 /100 WBCS — SIGNIFICANT CHANGE UP (ref 0–0)
PH UR: 6.5 — SIGNIFICANT CHANGE UP (ref 5–8)
PHOSPHATE SERPL-MCNC: 3.3 MG/DL — SIGNIFICANT CHANGE UP (ref 2.1–4.9)
PLATELET # BLD AUTO: 364 K/UL — SIGNIFICANT CHANGE UP (ref 130–400)
PMV BLD: 10 FL — SIGNIFICANT CHANGE UP (ref 7.4–10.4)
POTASSIUM SERPL-MCNC: 3.7 MMOL/L — SIGNIFICANT CHANGE UP (ref 3.5–5)
POTASSIUM SERPL-MCNC: 3.9 MMOL/L — SIGNIFICANT CHANGE UP (ref 3.5–5)
POTASSIUM SERPL-SCNC: 3.7 MMOL/L — SIGNIFICANT CHANGE UP (ref 3.5–5)
POTASSIUM SERPL-SCNC: 3.9 MMOL/L — SIGNIFICANT CHANGE UP (ref 3.5–5)
PROT SERPL-MCNC: 6.4 G/DL — SIGNIFICANT CHANGE UP (ref 6–8)
PROT SERPL-MCNC: 7.3 G/DL — SIGNIFICANT CHANGE UP (ref 6–8)
PROT UR-MCNC: 30 MG/DL
PROTHROM AB SERPL-ACNC: 12.2 SEC — SIGNIFICANT CHANGE UP (ref 9.95–12.87)
RBC # BLD: 5.41 M/UL — SIGNIFICANT CHANGE UP (ref 4.7–6.1)
RBC # FLD: 13.1 % — SIGNIFICANT CHANGE UP (ref 11.5–14.5)
SODIUM SERPL-SCNC: 136 MMOL/L — SIGNIFICANT CHANGE UP (ref 135–146)
SODIUM SERPL-SCNC: 136 MMOL/L — SIGNIFICANT CHANGE UP (ref 135–146)
SP GR SPEC: >1.03 — HIGH (ref 1–1.03)
TROPONIN T, HIGH SENSITIVITY RESULT: 8 NG/L — SIGNIFICANT CHANGE UP (ref 6–21)
UROBILINOGEN FLD QL: 1 MG/DL — SIGNIFICANT CHANGE UP (ref 0.2–1)
WBC # BLD: 15.92 K/UL — HIGH (ref 4.8–10.8)
WBC # FLD AUTO: 15.92 K/UL — HIGH (ref 4.8–10.8)

## 2024-12-21 PROCEDURE — 84100 ASSAY OF PHOSPHORUS: CPT

## 2024-12-21 PROCEDURE — 83540 ASSAY OF IRON: CPT

## 2024-12-21 PROCEDURE — 99285 EMERGENCY DEPT VISIT HI MDM: CPT

## 2024-12-21 PROCEDURE — 83550 IRON BINDING TEST: CPT

## 2024-12-21 PROCEDURE — 84145 PROCALCITONIN (PCT): CPT

## 2024-12-21 PROCEDURE — 85027 COMPLETE CBC AUTOMATED: CPT

## 2024-12-21 PROCEDURE — 80346 BENZODIAZEPINES1-12: CPT

## 2024-12-21 PROCEDURE — 85652 RBC SED RATE AUTOMATED: CPT

## 2024-12-21 PROCEDURE — 80076 HEPATIC FUNCTION PANEL: CPT

## 2024-12-21 PROCEDURE — 80048 BASIC METABOLIC PNL TOTAL CA: CPT

## 2024-12-21 PROCEDURE — 80053 COMPREHEN METABOLIC PANEL: CPT

## 2024-12-21 PROCEDURE — 86140 C-REACTIVE PROTEIN: CPT

## 2024-12-21 PROCEDURE — 99223 1ST HOSP IP/OBS HIGH 75: CPT

## 2024-12-21 PROCEDURE — 80307 DRUG TEST PRSMV CHEM ANLYZR: CPT

## 2024-12-21 PROCEDURE — 83605 ASSAY OF LACTIC ACID: CPT

## 2024-12-21 PROCEDURE — 84443 ASSAY THYROID STIM HORMONE: CPT

## 2024-12-21 PROCEDURE — 80354 DRUG SCREENING FENTANYL: CPT

## 2024-12-21 PROCEDURE — 83735 ASSAY OF MAGNESIUM: CPT

## 2024-12-21 PROCEDURE — 74177 CT ABD & PELVIS W/CONTRAST: CPT | Mod: 26,MC

## 2024-12-21 PROCEDURE — 36415 COLL VENOUS BLD VENIPUNCTURE: CPT

## 2024-12-21 PROCEDURE — 80349 CANNABINOIDS NATURAL: CPT

## 2024-12-21 RX ORDER — KETOROLAC TROMETHAMINE 30 MG/ML
15 INJECTION INTRAMUSCULAR; INTRAVENOUS ONCE
Refills: 0 | Status: DISCONTINUED | OUTPATIENT
Start: 2024-12-21 | End: 2024-12-21

## 2024-12-21 RX ORDER — LORAZEPAM 1 MG/1
2 TABLET ORAL
Refills: 0 | Status: DISCONTINUED | OUTPATIENT
Start: 2024-12-21 | End: 2024-12-21

## 2024-12-21 RX ORDER — ONDANSETRON 4 MG/1
4 TABLET ORAL ONCE
Refills: 0 | Status: COMPLETED | OUTPATIENT
Start: 2024-12-21 | End: 2024-12-21

## 2024-12-21 RX ORDER — SODIUM CHLORIDE 9 MG/ML
1000 INJECTION, SOLUTION INTRAVENOUS ONCE
Refills: 0 | Status: COMPLETED | OUTPATIENT
Start: 2024-12-21 | End: 2024-12-21

## 2024-12-21 RX ORDER — ALBUTEROL SULFATE 90 UG/1
2 INHALANT RESPIRATORY (INHALATION)
Refills: 0 | DISCHARGE

## 2024-12-21 RX ORDER — ACETAMINOPHEN 80 MG/.8ML
650 SOLUTION/ DROPS ORAL EVERY 6 HOURS
Refills: 0 | Status: DISCONTINUED | OUTPATIENT
Start: 2024-12-21 | End: 2024-12-23

## 2024-12-21 RX ORDER — METRONIDAZOLE 250 MG/1
TABLET ORAL
Refills: 0 | Status: DISCONTINUED | OUTPATIENT
Start: 2024-12-21 | End: 2024-12-21

## 2024-12-21 RX ORDER — THIAMINE HYDROCHLORIDE 100 MG/ML
100 INJECTION, SOLUTION INTRAMUSCULAR; INTRAVENOUS DAILY
Refills: 0 | Status: DISCONTINUED | OUTPATIENT
Start: 2024-12-21 | End: 2024-12-23

## 2024-12-21 RX ORDER — SODIUM CHLORIDE 9 MG/ML
2400 INJECTION, SOLUTION INTRAVENOUS ONCE
Refills: 0 | Status: COMPLETED | OUTPATIENT
Start: 2024-12-21 | End: 2024-12-21

## 2024-12-21 RX ORDER — LORAZEPAM 1 MG/1
2 TABLET ORAL
Refills: 0 | Status: DISCONTINUED | OUTPATIENT
Start: 2024-12-21 | End: 2024-12-23

## 2024-12-21 RX ORDER — SODIUM CHLORIDE 9 MG/ML
1000 INJECTION, SOLUTION INTRAVENOUS
Refills: 0 | Status: DISCONTINUED | OUTPATIENT
Start: 2024-12-21 | End: 2024-12-23

## 2024-12-21 RX ORDER — METRONIDAZOLE 250 MG/1
500 TABLET ORAL EVERY 8 HOURS
Refills: 0 | Status: DISCONTINUED | OUTPATIENT
Start: 2024-12-21 | End: 2024-12-21

## 2024-12-21 RX ORDER — METRONIDAZOLE 250 MG/1
500 TABLET ORAL ONCE
Refills: 0 | Status: COMPLETED | OUTPATIENT
Start: 2024-12-21 | End: 2024-12-21

## 2024-12-21 RX ORDER — ONDANSETRON 4 MG/1
4 TABLET ORAL THREE TIMES A DAY
Refills: 0 | Status: DISCONTINUED | OUTPATIENT
Start: 2024-12-21 | End: 2024-12-23

## 2024-12-21 RX ORDER — ALBUTEROL SULFATE 90 UG/1
1 INHALANT RESPIRATORY (INHALATION)
Refills: 0 | Status: DISCONTINUED | OUTPATIENT
Start: 2024-12-21 | End: 2024-12-23

## 2024-12-21 RX ORDER — INFLUENZA A VIRUS A/WISCONSIN/588/2019 (H1N1) RECOMBINANT HEMAGGLUTININ ANTIGEN, INFLUENZA A VIRUS A/DARWIN/6/2021 (H3N2) RECOMBINANT HEMAGGLUTININ ANTIGEN, INFLUENZA B VIRUS B/AUSTRIA/1359417/2021 RECOMBINANT HEMAGGLUTININ ANTIGEN, AND INFLUENZA B VIRUS B/PHUKET/3073/2013 RECOMBINANT HEMAGGLUTININ ANTIGEN 45; 45; 45; 45 UG/.5ML; UG/.5ML; UG/.5ML; UG/.5ML
0.5 INJECTION INTRAMUSCULAR ONCE
Refills: 0 | Status: DISCONTINUED | OUTPATIENT
Start: 2024-12-21 | End: 2024-12-23

## 2024-12-21 RX ORDER — LORAZEPAM 1 MG/1
1 TABLET ORAL ONCE
Refills: 0 | Status: DISCONTINUED | OUTPATIENT
Start: 2024-12-21 | End: 2024-12-21

## 2024-12-21 RX ORDER — VITAMIN A 10000 UNIT
1 TABLET ORAL DAILY
Refills: 0 | Status: DISCONTINUED | OUTPATIENT
Start: 2024-12-21 | End: 2024-12-23

## 2024-12-21 RX ADMIN — Medication 100 MILLIGRAM(S): at 11:54

## 2024-12-21 RX ADMIN — SODIUM CHLORIDE 1000 MILLILITER(S): 9 INJECTION, SOLUTION INTRAVENOUS at 14:00

## 2024-12-21 RX ADMIN — KETOROLAC TROMETHAMINE 15 MILLIGRAM(S): 30 INJECTION INTRAMUSCULAR; INTRAVENOUS at 16:07

## 2024-12-21 RX ADMIN — ONDANSETRON 4 MILLIGRAM(S): 4 TABLET ORAL at 13:29

## 2024-12-21 RX ADMIN — SODIUM CHLORIDE 1000 MILLILITER(S): 9 INJECTION, SOLUTION INTRAVENOUS at 11:54

## 2024-12-21 RX ADMIN — METRONIDAZOLE 500 MILLIGRAM(S): 250 TABLET ORAL at 12:00

## 2024-12-21 RX ADMIN — METRONIDAZOLE 100 MILLIGRAM(S): 250 TABLET ORAL at 11:54

## 2024-12-21 RX ADMIN — LORAZEPAM 1 MILLIGRAM(S): 1 TABLET ORAL at 17:43

## 2024-12-21 RX ADMIN — SODIUM CHLORIDE 2400 MILLILITER(S): 9 INJECTION, SOLUTION INTRAVENOUS at 11:53

## 2024-12-21 RX ADMIN — KETOROLAC TROMETHAMINE 15 MILLIGRAM(S): 30 INJECTION INTRAMUSCULAR; INTRAVENOUS at 13:29

## 2024-12-21 RX ADMIN — SODIUM CHLORIDE 2400 MILLILITER(S): 9 INJECTION, SOLUTION INTRAVENOUS at 13:30

## 2024-12-21 RX ADMIN — Medication 2000 MILLIGRAM(S): at 12:54

## 2024-12-21 RX ADMIN — LORAZEPAM 2 MILLIGRAM(S): 1 TABLET ORAL at 22:24

## 2024-12-21 NOTE — H&P ADULT - HISTORY OF PRESENT ILLNESS
29 year old man with a past medical history of alcohol use disorder, chronic marijuana use, and asthma on albuterol inhalers presented for Vomiting and severe tremors. Patient presented yesterday to the ED for vomiting and abdominal pain where he underwent a CT-abdomen/pelvis that showed possible signs of colitis. He reported that he ate chinese food from a take away restaurant yesterday and he started having abdominal pain and vomiting shortly afterwards. he was prescribed Augmentin by the ED after being discharged. Today he reported having excessive sweating, tremors, vomiting, palpitations, and dizziness. He does not have abdominal pain at the moment, headache, hallucinations, changes in bowel habits, fever or chills. He vehemently denies drinking alcohol for the last 2 months, smoking more that usual, or doing hard drugs. He has no known allergies.     In the ED: T: 36.8   BP: 128/80  HR: 109 RR: 18   Spo2: 98% on room air  CT abdomen/pelvis: No abnormal bowel dilation or wall thickening. Post appendectomy. No ascites. No CT evidence of acute abdominopelvic pathology.    Patient received metronidazole, cefepime, lorazepam (for which he improved afterwards), LR    Labs: WBC 15.922 (22.44 yesterday)    Lac: 2.8--->1.8 after fluids 29 year old man with a past medical history of alcohol use disorder, chronic marijuana use, and asthma on albuterol inhalers presented for Vomiting and severe tremors. Patient presented yesterday to the ED for vomiting and abdominal pain where he underwent a CT-abdomen/pelvis that showed possible signs of colitis. He reported that he ate chinese food from a take away restaurant yesterday and he started having abdominal pain and vomiting shortly afterwards. he was prescribed Augmentin by the ED after being discharged. Today he reported having excessive sweating, tremors, vomiting, palpitations, and dizziness. He does not have abdominal pain at the moment, headache, hallucinations, changes in bowel habits, fever or chills. He vehemently denies drinking alcohol for the last 2 months, smoking more that usual, or doing hard drugs. He has no known allergies. 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 Post appendectomy. No ascites. No CT evidence of acute abdominopelvic pathology.    Patient received metronidazole, cefepime, lorazepam (for which he improved afterwards), LR    Labs: WBC 15.922 (22.44 yesterday)    Lac: 2.8--->1.8 after fluids

## 2024-12-21 NOTE — ED PROVIDER NOTE - CARE PLAN
Principal Discharge DX:	Sepsis  Secondary Diagnosis:	Nausea & vomiting  Secondary Diagnosis:	Abdominal pain  Secondary Diagnosis:	Rigors   1

## 2024-12-21 NOTE — ED PROVIDER NOTE - PROGRESS NOTE DETAILS
De Adrianna: Pt given ativan for ongoing rigors. Lactate downtrended. CT A/P negative for acute intra-abdominal infection. Tachycardia has improved. Will admit given inability to tolerate PO

## 2024-12-21 NOTE — H&P ADULT - ATTENDING COMMENTS
Assessment    Vomiting and tremors possibly secondary to alcohol withdrawal (resolved currently)  Hx of alcohol abuse  Chronic leukocytosis  Chronic marijuana use  Asthma    Plan    - c/w CIWA protocol as patient has been having improvement in symptoms, denies alcohol use, f/u alcohol level, SDS and UDS, folic acid, thiamine, MVI  - f/u ESR, CRP, procal  - home albuterol prn    Pending: clinical improvement, monitoring on ciwa    # DVT PPX: ambulatory    GENERAL: NAD, lying in bed comfortably  HEAD:  Atraumatic, normocephalic  NERVOUS SYSTEM:  A&Ox3, moving all extremities, no focal deficits   EYES: EOMI, PERRL  NECK: Supple, trachea midline, no JVD  HEART: Regular rate and rhythm  LUNGS: Clear to auscultation bilaterally, no crackles, wheezing, or rhonchi  ABDOMEN: Soft, nontender, nondistended, +BS  EXTREMITIES: 2+ peripheral pulses bilaterally. No clubbing, cyanosis, or edema    75 minutes spent on review of labs, imaging studies, old records, obtaining history, personally examining patient, counselling and communicating with patient/ family, entering orders for medications/tests/etc, discussions with other health care providers, documentation in electronic health records, independent interpretation of labs, imaging/procedure results and care coordination.

## 2024-12-21 NOTE — ED PROVIDER NOTE - PHYSICAL EXAMINATION
CONSTITUTIONAL: Diaphoretic and uncomfortable   HEAD: Normocephalic; atraumatic.   EYES: PERRL; EOM intact. Conjunctiva normal B/L.   ENT: Normal pharynx with no tonsillar hypertrophy. MMM.  CHEST: Normal chest excursion with respiration.   CARDIOVASCULAR: Normal S1, S2; no murmurs, rubs, or gallops.   RESPIRATORY: Normal chest excursion with respiration; breath sounds clear and equal bilaterally; no wheezes, rhonchi, or rales.  GI/: Soft, non-distended, tender to palpation diffusely most significantly in the b/l lower quadrants with mild rebound, no guarding   EXT: Normal ROM in all four extremities; non-tender to palpation; distal pulses are normal. No leg edema B/L.   SKIN: Diaphoretic and pale   NEURO: A & O x 4

## 2024-12-21 NOTE — ED ADULT NURSE NOTE - OBJECTIVE STATEMENT
pt is a 29 y.o. male c/o lower abdominal pain, pt is ax4, on room air, ambulatory w/ a steady gait, pt reports " I have food poison, and I do no feel good", pt reports being dx w/ colon infection and on antibiotics, but still no relief, pt has a hx of asthma, skin si dry & intact, breath sounds are non labored, no other concerns are noted. bed is in low position, side rails are raised, call light within reach.

## 2024-12-21 NOTE — ED ADULT NURSE NOTE - CHIEF COMPLAINT
[facility]ing:  I discussed with the patient the risks of golimumab including but not limited to myelosuppression, immunosuppression, autoimmune hepatitis, demyelinating diseases, lymphoma, and serious infections.  The patient understands that monitoring is required including a PPD at baseline and must alert us or the primary physician if symptoms of infection or other concerning signs are noted. The patient is a 29y Male complaining of abdominal pain.

## 2024-12-21 NOTE — H&P ADULT - NSHPLABSRESULTS_GEN_ALL_CORE
16.5   15.92 )-----------( 364      ( 21 Dec 2024 12:38 )             45.0       12-21    136  |  99  |  14  ----------------------------<  101[H]  3.9   |  20  |  0.9    Ca    9.8      21 Dec 2024 12:38    TPro  7.3  /  Alb  4.6  /  TBili  1.1  /  DBili  x   /  AST  35  /  ALT  27  /  AlkPhos  85  12-21              Urinalysis Basic - ( 21 Dec 2024 12:38 )    Color: Dark Yellow / Appearance: Cloudy / SG: >1.030 / pH: x  Gluc: 101 mg/dL / Ketone: 80 mg/dL  / Bili: Negative / Urobili: 1.0 mg/dL   Blood: x / Protein: 30 mg/dL / Nitrite: Negative   Leuk Esterase: Negative / RBC: 2 /HPF / WBC 1 /HPF   Sq Epi: x / Non Sq Epi: 2 /HPF / Bacteria: Occasional /HPF        PT/INR - ( 21 Dec 2024 12:38 )   PT: 12.20 sec;   INR: 1.03 ratio         PTT - ( 21 Dec 2024 12:38 )  PTT:26.2 sec    Lactate Trend  12-21 @ 15:54 Lactate:1.8   12-21 @ 12:38 Lactate:2.9             CAPILLARY BLOOD GLUCOSE      POCT Blood Glucose.: 161 mg/dL (20 Dec 2024 01:59)              RADIOLOGY & ADDITIONAL TESTS:    Imaging Personally Reviewed:  [ ] YES     EKG personally reviewed [ ] YES, interpretation:     Telemetry reviewed:

## 2024-12-21 NOTE — ED ADULT NURSE REASSESSMENT NOTE - NS ED NURSE REASSESS COMMENT FT1
pt is resting, pt reports feeling better, bed is in low position, side rails are raised, no other concerns are noted.

## 2024-12-21 NOTE — PATIENT PROFILE ADULT - FALL HARM RISK - HARM RISK INTERVENTIONS
Assistance with ambulation/Assistance OOB with selected safe patient handling equipment/Communicate Risk of Fall with Harm to all staff/Monitor for mental status changes/Monitor gait and stability/Reinforce activity limits and safety measures with patient and family/Tailored Fall Risk Interventions/Toileting schedule using arm’s reach rule for commode and bathroom/Use of alarms - bed, chair and/or voice tab/Visual Cue: Yellow wristband and red socks/Bed in lowest position, wheels locked, appropriate side rails in place/Call bell, personal items and telephone in reach/Instruct patient to call for assistance before getting out of bed or chair/Non-slip footwear when patient is out of bed/[place] to call system/Physically safe environment - no spills, clutter or unnecessary equipment/Purposeful Proactive Rounding/Room/bathroom lighting operational, light cord in reach

## 2024-12-21 NOTE — ED PROVIDER NOTE - CLINICAL SUMMARY MEDICAL DECISION MAKING FREE TEXT BOX
29-year-old man, no significant past medical history presents with 2 days of persistent nausea, vomiting, lower abdominal pain.  He was seen in the ED yesterday, symptoms improved somewhat, CT showed mild colitis.  Patient was given antibiotics, but has not been able to tolerate p.o.  On exam he is pale, diaphoretic, uncomfortable.  Labs okay, other than increased lactate, liikely due to vomiting, but sepsis also on ddx. patient continued to be uncomfortable in the ED, tachycardic, diaphoretic.  Asked multiple times about possible alcohol/opiate use as the symptoms could be secondary to withdrawal. Pt denies. Symptoms improved slightly with, patient admitted for further hydration, symptom control and management.

## 2024-12-21 NOTE — H&P ADULT - ASSESSMENT
29 year old man with a past medical history of alcohol use disorder, chronic marijuana use, and asthma on albuterol inhalers presented for Vomiting and severe tremors    #Possible mild alcohol withdrawal  - Even though patient  denied using alcohol for the past 2 months since his last admission, patient's CIWA score at least 8 and above. Clinical pictures leans toward withdrawal symptoms  - Patient was admitted in August for alcohol withdrawal  -  He started having abdominal pain and vomiting reportedly shortly afterward eating chinese food yesterday. He was prescribed Augmentin by the ED after being discharged. Today he reported having excessive sweating, tremors, vomiting, palpitations, and dizziness  - In the ED: T: 36.8   BP: 128/80  HR: 109 RR: 18   Spo2: 98% on room air  -CT abdomen/pelvis: No abnormal bowel dilation or wall thickening. Post appendectomy. No ascites. No CT evidence of acute abdominopelvic pathology.  -Patient received metronidazole, cefepime, lorazepam (for which he improved afterwards), LR  --Labs: WBC 15.922 (22.44 yesterday)    Lac: 2.8--->1.8 after fluids  - Will start patient on CIWA protocol  - Start folic acid and thiamine  - Will hold abx. Patient's abdomen is soft, he does not have bowel habit changes, nor does he report abdominal pain. Main complaint is vomiting, tremor, anxiousness, dizziness  - Will start patient on LR 75cc/hr   - f/u Alcohol level  - f/u urine drug level  - f/u TSH level   - Trend wbcs and lactate    #Asthma  - On albuterol inhalers PRN    #Full code 29 year old man with a past medical history of alcohol use disorder, chronic marijuana use, and asthma on albuterol inhalers presented for Vomiting and severe tremors    #Possible mild alcohol withdrawal  #Possible viral gastritis  - Even though patient  denied using alcohol for the past 2 months since his last admission, patient's CIWA score at least 8 and above. Clinical pictures leans toward withdrawal symptoms  - Patient was admitted in August for alcohol withdrawal  -  He started having abdominal pain and vomiting reportedly shortly afterward eating chinese food yesterday. He was prescribed Augmentin by the ED after being discharged. Today he reported having excessive sweating, tremors, vomiting, palpitations, and dizziness  - In the ED: T: 36.8   BP: 128/80  HR: 109 RR: 18   Spo2: 98% on room air  -CT abdomen/pelvis: No abnormal bowel dilation or wall thickening. Post appendectomy. No ascites. No CT evidence of acute abdominopelvic pathology.  -Patient received metronidazole, cefepime, lorazepam (for which he improved afterwards), LR  --Labs: WBC 15.922 (22.44 yesterday)    Lac: 2.8--->1.8 after fluids  - Will start patient on CIWA protocol  - Start folic acid and thiamine  - Will hold abx. Patient's abdomen is soft, he does not have bowel habit changes, nor does he report abdominal pain. Main complaint is vomiting, tremor, anxiousness, dizziness  - Will start patient on LR 75cc/hr   - Zofran PRN for nausea/vomiting  - f/u Alcohol level  - f/u urine drug level  - f/u TSH level   - Trend wbcs and lactate    #Asthma  - On albuterol inhalers PRN    #Full code 29 year old man with a past medical history of alcohol use disorder, chronic marijuana use, and asthma on albuterol inhalers presented for Vomiting and severe tremors    #Possible mild alcohol withdrawal  #Possible viral gastritis  - Even though patient  denied using alcohol for the past 2 months since his last admission, patient's CIWA score at least 8 and above. Clinical pictures leans toward withdrawal symptoms  - Patient was admitted in August for alcohol withdrawal  -  He started having abdominal pain and vomiting reportedly shortly afterward eating chinese food yesterday. He was prescribed Augmentin by the ED after being discharged. Today he reported having excessive sweating, tremors, vomiting, palpitations, and dizziness  - In the ED: T: 36.8   BP: 128/80  HR: 109 RR: 18   Spo2: 98% on room air  -CT abdomen/pelvis: No abnormal bowel dilation or wall thickening. Post appendectomy. No ascites. No CT evidence of acute abdominopelvic pathology.  -Patient received metronidazole, cefepime, lorazepam (for which he improved afterwards), LR  --Labs: WBC 15.922 (22.44 yesterday)    Lac: 2.8--->1.8 after fluids  - Will start patient on CIWA protocol  - Start folic acid and thiamine  - Will hold abx. High WBC was also present on previous admission. Patient's abdomen is soft, he does not have bowel habit changes, nor does he report abdominal pain. Main complaint is vomiting, tremor, anxiousness, dizziness  - Will start patient on LR 75cc/hr   - Zofran PRN for nausea/vomiting  - f/u Alcohol level  - f/u urine drug level  - f/u TSH level   - Trend wbcs and lactate    #Asthma  - On albuterol inhalers PRN    #Full code

## 2024-12-21 NOTE — ED ADULT NURSE REASSESSMENT NOTE - NS ED NURSE REASSESS COMMENT FT1
Pt has tremors, pt states he is not going through withdrawals of any substance, provider made aware, pt in the bed, bed in low position, side rails are raised, pt near nurse station, no other concerns are noted. Ativan ordered for pt.

## 2024-12-21 NOTE — ED ADULT NURSE REASSESSMENT NOTE - NS ED NURSE REASSESS COMMENT FT1
pt reports he feels better after ativan, pt does not need anything at the time, pt has not vomited in 4 hours, pt is aware of POC, no other concerns are noted. bed is in low position, side rails are raised, no other concerns are noted.

## 2024-12-21 NOTE — PATIENT PROFILE ADULT - NSPROPASSIVESMOKEEXPOSURE_GEN_A_NUR
Patient is calling about the below medication -     gabapentin (NEURONTIN) 800 MG tablet    08/06/19  --  Clifford Tracy MD     Take 1.5 tablets by mouth 3 times daily.     The patient states the increase in the medication is not helping.  Please call patient back to discuss increase in dosage or an alternative.         Unknown

## 2024-12-21 NOTE — ED PROVIDER NOTE - OBJECTIVE STATEMENT
29-year-old male with no significant PMH, PSH appendectomy who presents to the ED with 2 days of uncontrollable nausea, vomiting and lower abdominal pain.  Patient was seen in the ER yesterday, at that time nausea and vomiting resolved and a CT scan showed mild colitis.  The patient was subsequently sent home with a p.o. dose of Augmentin what he has been compliant with so far.  He states that when he got home his nausea and vomiting returned. This morning he woke up with worsening abdominal pain, profuse diaphoresis and chills prompting his presentation back to the ER. Last BM was 3 days ago. Passed flatus this morning.

## 2024-12-21 NOTE — H&P ADULT - NSHPPHYSICALEXAM_GEN_ALL_CORE
T(C): 36.8 (12-21-24 @ 16:51), Max: 36.9 (12-21-24 @ 15:30)  HR: 88 (12-21-24 @ 16:51) (74 - 109)  BP: 121/58 (12-21-24 @ 16:51) (121/58 - 132/77)  RR: 18 (12-21-24 @ 16:51) (17 - 20)  SpO2: 96% (12-21-24 @ 16:51) (96% - 99%)    CONSTITUTIONAL: Well groomed, appears anxious  EYES: PERRLA and symmetric, EOMI, No conjunctival or scleral injection, non-icteric  ENMT: Oral mucosa with moist membranes. Normal dentition; no pharyngeal injection or exudates             NECK: Supple, symmetric and without tracheal deviation   RESP: No respiratory distress, no use of accessory muscles; CTA b/l, no WRR  CV: RRR, +S1S2, no MRG; no JVD; no peripheral edema  GI: Soft, NT, ND, no rebound, no guarding; no palpable masses; no hepatosplenomegaly; no hernia palpated  LYMPH: No cervical LAD or tenderness; no axillary LAD or tenderness; no inguinal LAD or tenderness  MSK: Normal gait; No digital clubbing or cyanosis; examination of the (head/neck/spine/ribs/pelvis, RUE, LUE, RLE, LLE) without misalignment,            Normal ROM without pain, no spinal tenderness, normal muscle strength/tone  SKIN: No rashes or ulcers noted; no subcutaneous nodules or induration palpable  NEURO:Patient having tremors in all his extremities  PSYCH: Appropriate insight/judgment; A+O x 3, mood and affect appropriate, recent/remote memory intact

## 2024-12-22 LAB
ALBUMIN SERPL ELPH-MCNC: 4.1 G/DL — SIGNIFICANT CHANGE UP (ref 3.5–5.2)
ALBUMIN SERPL ELPH-MCNC: 4.4 G/DL — SIGNIFICANT CHANGE UP (ref 3.5–5.2)
ALP SERPL-CCNC: 70 U/L — SIGNIFICANT CHANGE UP (ref 30–115)
ALP SERPL-CCNC: 78 U/L — SIGNIFICANT CHANGE UP (ref 30–115)
ALT FLD-CCNC: 26 U/L — SIGNIFICANT CHANGE UP (ref 0–41)
ALT FLD-CCNC: 26 U/L — SIGNIFICANT CHANGE UP (ref 0–41)
ANION GAP SERPL CALC-SCNC: 13 MMOL/L — SIGNIFICANT CHANGE UP (ref 7–14)
ANION GAP SERPL CALC-SCNC: 14 MMOL/L — SIGNIFICANT CHANGE UP (ref 7–14)
AST SERPL-CCNC: 32 U/L — SIGNIFICANT CHANGE UP (ref 0–41)
AST SERPL-CCNC: 32 U/L — SIGNIFICANT CHANGE UP (ref 0–41)
BILIRUB DIRECT SERPL-MCNC: 0.3 MG/DL — SIGNIFICANT CHANGE UP (ref 0–0.3)
BILIRUB INDIRECT FLD-MCNC: 0.6 MG/DL — SIGNIFICANT CHANGE UP (ref 0.2–1.2)
BILIRUB SERPL-MCNC: 0.9 MG/DL — SIGNIFICANT CHANGE UP (ref 0.2–1.2)
BILIRUB SERPL-MCNC: 1.2 MG/DL — SIGNIFICANT CHANGE UP (ref 0.2–1.2)
BUN SERPL-MCNC: 11 MG/DL — SIGNIFICANT CHANGE UP (ref 10–20)
BUN SERPL-MCNC: 13 MG/DL — SIGNIFICANT CHANGE UP (ref 10–20)
CALCIUM SERPL-MCNC: 8.8 MG/DL — SIGNIFICANT CHANGE UP (ref 8.4–10.4)
CALCIUM SERPL-MCNC: 9.5 MG/DL — SIGNIFICANT CHANGE UP (ref 8.4–10.5)
CHLORIDE SERPL-SCNC: 100 MMOL/L — SIGNIFICANT CHANGE UP (ref 98–110)
CHLORIDE SERPL-SCNC: 99 MMOL/L — SIGNIFICANT CHANGE UP (ref 98–110)
CO2 SERPL-SCNC: 23 MMOL/L — SIGNIFICANT CHANGE UP (ref 17–32)
CO2 SERPL-SCNC: 24 MMOL/L — SIGNIFICANT CHANGE UP (ref 17–32)
CREAT SERPL-MCNC: 0.8 MG/DL — SIGNIFICANT CHANGE UP (ref 0.7–1.5)
CREAT SERPL-MCNC: 0.8 MG/DL — SIGNIFICANT CHANGE UP (ref 0.7–1.5)
CRP SERPL-MCNC: 7.8 MG/L — HIGH
EGFR: 124 ML/MIN/1.73M2 — SIGNIFICANT CHANGE UP
EGFR: 124 ML/MIN/1.73M2 — SIGNIFICANT CHANGE UP
ERYTHROCYTE [SEDIMENTATION RATE] IN BLOOD: 3 MM/HR — SIGNIFICANT CHANGE UP (ref 0–10)
GLUCOSE SERPL-MCNC: 74 MG/DL — SIGNIFICANT CHANGE UP (ref 70–99)
GLUCOSE SERPL-MCNC: 80 MG/DL — SIGNIFICANT CHANGE UP (ref 70–99)
HCT VFR BLD CALC: 44.6 % — SIGNIFICANT CHANGE UP (ref 42–52)
HGB BLD-MCNC: 15.9 G/DL — SIGNIFICANT CHANGE UP (ref 14–18)
IRON SATN MFR SERPL: 162 UG/DL — HIGH (ref 35–150)
IRON SATN MFR SERPL: 60 % — HIGH (ref 15–50)
MAGNESIUM SERPL-MCNC: 2.3 MG/DL — SIGNIFICANT CHANGE UP (ref 1.8–2.4)
MAGNESIUM SERPL-MCNC: 2.3 MG/DL — SIGNIFICANT CHANGE UP (ref 1.8–2.4)
MCHC RBC-ENTMCNC: 30.7 PG — SIGNIFICANT CHANGE UP (ref 27–31)
MCHC RBC-ENTMCNC: 35.7 G/DL — SIGNIFICANT CHANGE UP (ref 32–37)
MCV RBC AUTO: 86.1 FL — SIGNIFICANT CHANGE UP (ref 80–94)
NRBC # BLD: 0 /100 WBCS — SIGNIFICANT CHANGE UP (ref 0–0)
PHOSPHATE SERPL-MCNC: 3.3 MG/DL — SIGNIFICANT CHANGE UP (ref 2.1–4.9)
PHOSPHATE SERPL-MCNC: 3.5 MG/DL — SIGNIFICANT CHANGE UP (ref 2.1–4.9)
PLATELET # BLD AUTO: 323 K/UL — SIGNIFICANT CHANGE UP (ref 130–400)
PMV BLD: 9.9 FL — SIGNIFICANT CHANGE UP (ref 7.4–10.4)
POTASSIUM SERPL-MCNC: 3.6 MMOL/L — SIGNIFICANT CHANGE UP (ref 3.5–5)
POTASSIUM SERPL-MCNC: 3.6 MMOL/L — SIGNIFICANT CHANGE UP (ref 3.5–5)
POTASSIUM SERPL-SCNC: 3.6 MMOL/L — SIGNIFICANT CHANGE UP (ref 3.5–5)
POTASSIUM SERPL-SCNC: 3.6 MMOL/L — SIGNIFICANT CHANGE UP (ref 3.5–5)
PROT SERPL-MCNC: 6.4 G/DL — SIGNIFICANT CHANGE UP (ref 6–8)
PROT SERPL-MCNC: 6.8 G/DL — SIGNIFICANT CHANGE UP (ref 6–8)
RBC # BLD: 5.18 M/UL — SIGNIFICANT CHANGE UP (ref 4.7–6.1)
RBC # FLD: 13.1 % — SIGNIFICANT CHANGE UP (ref 11.5–14.5)
SODIUM SERPL-SCNC: 136 MMOL/L — SIGNIFICANT CHANGE UP (ref 135–146)
SODIUM SERPL-SCNC: 137 MMOL/L — SIGNIFICANT CHANGE UP (ref 135–146)
TIBC SERPL-MCNC: 268 UG/DL — SIGNIFICANT CHANGE UP (ref 220–430)
UIBC SERPL-MCNC: 106 UG/DL — LOW (ref 110–370)
WBC # BLD: 13.33 K/UL — HIGH (ref 4.8–10.8)
WBC # FLD AUTO: 13.33 K/UL — HIGH (ref 4.8–10.8)

## 2024-12-22 PROCEDURE — 99232 SBSQ HOSP IP/OBS MODERATE 35: CPT

## 2024-12-22 RX ORDER — PANTOPRAZOLE 40 MG/1
40 TABLET, DELAYED RELEASE ORAL
Refills: 0 | Status: DISCONTINUED | OUTPATIENT
Start: 2024-12-22 | End: 2024-12-23

## 2024-12-22 RX ORDER — LORAZEPAM 1 MG/1
1 TABLET ORAL ONCE
Refills: 0 | Status: DISCONTINUED | OUTPATIENT
Start: 2024-12-22 | End: 2024-12-23

## 2024-12-22 RX ORDER — GABAPENTIN 300 MG/1
100 CAPSULE ORAL ONCE
Refills: 0 | Status: COMPLETED | OUTPATIENT
Start: 2024-12-22 | End: 2024-12-22

## 2024-12-22 RX ADMIN — Medication 1 MILLIGRAM(S): at 11:01

## 2024-12-22 RX ADMIN — THIAMINE HYDROCHLORIDE 100 MILLIGRAM(S): 100 INJECTION, SOLUTION INTRAMUSCULAR; INTRAVENOUS at 11:01

## 2024-12-22 RX ADMIN — LORAZEPAM 2 MILLIGRAM(S): 1 TABLET ORAL at 07:07

## 2024-12-22 RX ADMIN — LORAZEPAM 2 MILLIGRAM(S): 1 TABLET ORAL at 15:53

## 2024-12-22 RX ADMIN — GABAPENTIN 100 MILLIGRAM(S): 300 CAPSULE ORAL at 03:54

## 2024-12-22 RX ADMIN — LORAZEPAM 2 MILLIGRAM(S): 1 TABLET ORAL at 22:55

## 2024-12-22 NOTE — PROGRESS NOTE ADULT - ASSESSMENT
29 year old man with a past medical history of alcohol use disorder, chronic marijuana use, and asthma on albuterol inhalers presented for Vomiting and severe tremors. Patient presented yesterday to the ED for vomiting and abdominal pain where he underwent a CT-abdomen/pelvis that showed possible signs of colitis. He reported that he ate chinese food from a take away restaurant yesterday and he started having abdominal pain and vomiting shortly afterwards. he was prescribed Augmentin by the ED after being discharged. Today he reported having excessive sweating, tremors, vomiting, palpitations, and dizziness. He does not have abdominal pain at the moment, headache, hallucinations, changes in bowel habits, fever or chills.    # Lactic acidosis -resolved  # Reactive leucocytosis  # Cannabis abuse  # Possible  alcohol withdrawal  -  CT Abdomen and Pelvis w/ IV Cont (12.21.24 @ 12:43) >No CT evidence of acute abdominopelvic pathology.  - Alcohol, Blood: <10 mg/dL (12.21.24 @ 20:47)  - c/w ativan prn  - c/w thiamine ,folate  - zofran prn  - start protonix  - addiction medicine, catch team eval  - f/u urine drug level    # Asthma- stable   - albuterol prn    # DVT prophylaxis  - pt ambuating in the hallway  - SCD    # Full code    # Pending: UDS, addiction med, catch team gabby, TSH  # Discussed plan of care with patient  # Disposition: Home

## 2024-12-23 VITALS
HEART RATE: 96 BPM | OXYGEN SATURATION: 98 % | TEMPERATURE: 98 F | SYSTOLIC BLOOD PRESSURE: 126 MMHG | DIASTOLIC BLOOD PRESSURE: 69 MMHG | RESPIRATION RATE: 18 BRPM

## 2024-12-23 DIAGNOSIS — F90.9 ATTENTION-DEFICIT HYPERACTIVITY DISORDER, UNSPECIFIED TYPE: ICD-10-CM

## 2024-12-23 DIAGNOSIS — F12.20 CANNABIS DEPENDENCE, UNCOMPLICATED: ICD-10-CM

## 2024-12-23 LAB
AMPHET UR-MCNC: NEGATIVE — SIGNIFICANT CHANGE UP
ANION GAP SERPL CALC-SCNC: 16 MMOL/L — HIGH (ref 7–14)
BARBITURATES UR SCN-MCNC: NEGATIVE — SIGNIFICANT CHANGE UP
BENZODIAZ UR-MCNC: POSITIVE
BUN SERPL-MCNC: 13 MG/DL — SIGNIFICANT CHANGE UP (ref 10–20)
CALCIUM SERPL-MCNC: 9.3 MG/DL — SIGNIFICANT CHANGE UP (ref 8.4–10.5)
CHLORIDE SERPL-SCNC: 98 MMOL/L — SIGNIFICANT CHANGE UP (ref 98–110)
CO2 SERPL-SCNC: 25 MMOL/L — SIGNIFICANT CHANGE UP (ref 17–32)
COCAINE METAB.OTHER UR-MCNC: NEGATIVE — SIGNIFICANT CHANGE UP
CREAT SERPL-MCNC: 0.9 MG/DL — SIGNIFICANT CHANGE UP (ref 0.7–1.5)
DRUG SCREEN 1, URINE RESULT: SIGNIFICANT CHANGE UP
EGFR: 120 ML/MIN/1.73M2 — SIGNIFICANT CHANGE UP
FENTANYL UR QL: NEGATIVE — SIGNIFICANT CHANGE UP
GLUCOSE SERPL-MCNC: 65 MG/DL — LOW (ref 70–99)
HCT VFR BLD CALC: 47.1 % — SIGNIFICANT CHANGE UP (ref 42–52)
HGB BLD-MCNC: 17.2 G/DL — SIGNIFICANT CHANGE UP (ref 14–18)
MCHC RBC-ENTMCNC: 31.3 PG — HIGH (ref 27–31)
MCHC RBC-ENTMCNC: 36.5 G/DL — SIGNIFICANT CHANGE UP (ref 32–37)
MCV RBC AUTO: 85.8 FL — SIGNIFICANT CHANGE UP (ref 80–94)
METHADONE UR-MCNC: NEGATIVE — SIGNIFICANT CHANGE UP
NRBC # BLD: 0 /100 WBCS — SIGNIFICANT CHANGE UP (ref 0–0)
OPIATES UR-MCNC: NEGATIVE — SIGNIFICANT CHANGE UP
OXYCODONE UR-MCNC: NEGATIVE — SIGNIFICANT CHANGE UP
PCP UR-MCNC: NEGATIVE — SIGNIFICANT CHANGE UP
PLATELET # BLD AUTO: 343 K/UL — SIGNIFICANT CHANGE UP (ref 130–400)
PMV BLD: 9.8 FL — SIGNIFICANT CHANGE UP (ref 7.4–10.4)
POTASSIUM SERPL-MCNC: 3.8 MMOL/L — SIGNIFICANT CHANGE UP (ref 3.5–5)
POTASSIUM SERPL-SCNC: 3.8 MMOL/L — SIGNIFICANT CHANGE UP (ref 3.5–5)
PROCALCITONIN SERPL-MCNC: 0.1 NG/ML — SIGNIFICANT CHANGE UP (ref 0.02–0.1)
PROPOXYPHENE QUALITATIVE URINE RESULT: NEGATIVE — SIGNIFICANT CHANGE UP
RBC # BLD: 5.49 M/UL — SIGNIFICANT CHANGE UP (ref 4.7–6.1)
RBC # FLD: 12.7 % — SIGNIFICANT CHANGE UP (ref 11.5–14.5)
SODIUM SERPL-SCNC: 139 MMOL/L — SIGNIFICANT CHANGE UP (ref 135–146)
THC UR QL: POSITIVE
TSH SERPL-MCNC: 0.44 UIU/ML — SIGNIFICANT CHANGE UP (ref 0.27–4.2)
WBC # BLD: 11.69 K/UL — HIGH (ref 4.8–10.8)
WBC # FLD AUTO: 11.69 K/UL — HIGH (ref 4.8–10.8)

## 2024-12-23 PROCEDURE — 99253 IP/OBS CNSLTJ NEW/EST LOW 45: CPT

## 2024-12-23 PROCEDURE — 99239 HOSP IP/OBS DSCHRG MGMT >30: CPT

## 2024-12-23 RX ORDER — THIAMINE HYDROCHLORIDE 100 MG/ML
1 INJECTION, SOLUTION INTRAMUSCULAR; INTRAVENOUS
Qty: 30 | Refills: 0
Start: 2024-12-23 | End: 2025-01-21

## 2024-12-23 RX ORDER — POLYETHYLENE GLYCOL 3350 17 G/DOSE
17 POWDER (GRAM) ORAL ONCE
Refills: 0 | Status: COMPLETED | OUTPATIENT
Start: 2024-12-23 | End: 2024-12-23

## 2024-12-23 RX ORDER — PANTOPRAZOLE 40 MG/1
1 TABLET, DELAYED RELEASE ORAL
Qty: 30 | Refills: 0
Start: 2024-12-23 | End: 2025-01-21

## 2024-12-23 RX ORDER — VITAMIN A 10000 UNIT
1 TABLET ORAL
Qty: 30 | Refills: 0
Start: 2024-12-23 | End: 2025-01-21

## 2024-12-23 RX ORDER — VITAMIN A 10000 UNIT
1 TABLET ORAL
Qty: 0 | Refills: 0 | DISCHARGE
Start: 2024-12-23

## 2024-12-23 RX ADMIN — LORAZEPAM 2 MILLIGRAM(S): 1 TABLET ORAL at 06:29

## 2024-12-23 RX ADMIN — Medication 17 GRAM(S): at 06:43

## 2024-12-23 RX ADMIN — PANTOPRAZOLE 40 MILLIGRAM(S): 40 TABLET, DELAYED RELEASE ORAL at 05:06

## 2024-12-23 RX ADMIN — LORAZEPAM 2 MILLIGRAM(S): 1 TABLET ORAL at 13:11

## 2024-12-23 RX ADMIN — THIAMINE HYDROCHLORIDE 100 MILLIGRAM(S): 100 INJECTION, SOLUTION INTRAMUSCULAR; INTRAVENOUS at 11:07

## 2024-12-23 RX ADMIN — Medication 1 MILLIGRAM(S): at 11:08

## 2024-12-23 NOTE — DISCHARGE NOTE PROVIDER - ATTENDING DISCHARGE PHYSICAL EXAMINATION:
T(C): 36.8 (12-23-24 @ 08:42), Max: 36.8 (12-23-24 @ 08:42)  HR: 79 (12-23-24 @ 08:42) (71 - 87)  BP: 118/80 (12-23-24 @ 08:42) (110/61 - 138/88)  RR: 18 (12-23-24 @ 08:42) (18 - 18)  SpO2: 97% (12-23-24 @ 08:42) (96% - 98%)    O/E:  Awake, alert, not in distress.  HEENT: atraumatic, EOMI.  Chest: clear.  CVS: SIS2 +, no murmur.  P/A: Soft, BS+  CNS: awake, alert, b/l hand tremors+chronic  Ext: no edema feet.  All systems reviewed positive findings as above.

## 2024-12-23 NOTE — PROGRESS NOTE ADULT - ASSESSMENT
28 yo domiciled male with a past medical history of alcohol use disorder, chronic marijuana use, ADHD, and asthma on albuterol inhalers presented for vomiting and severe tremors. Patient was admitted for workup for suspected colitis, which was eventually ruled out. Addiction Medicine consulted for substance use.    #Cannabis Use Disorder  #Cannabinoid Hyperemesis Syndrome  - almost daily use of 10-15 joints of marijuana in the past 12 years, was referred to inpatient rehab 2 years ago but did not complete the treatment, currently no in treatment  - nausea/vomiting on admission 12/21, currently resolved  - denied recent use of alcohol, CIWA 0; denied use of other substances  - [facility] following, patient has motivation to cut down on smoking, will coordinate with [facility] team counsellors to connect to our CDOP    #ADHD, remote  - was on Adderall when 8-10 yo, not needing treatment for 20 yrs    Addiction Medicine will sign off. Please re-consult if questions arise. 28 yo domiciled male with a past medical history of alcohol use disorder, chronic marijuana use, ADHD, and asthma on albuterol inhalers presented for vomiting and severe tremors. Patient was admitted for workup for suspected colitis, which was eventually ruled out. Addiction Medicine consulted for substance use.    #Cannabis Use Disorder  #Possible S/S of Cannabinoid Hyperemesis Syndrome  - almost daily use of 10-15 joints of marijuana in the past 12 years, was referred to inpatient rehab 2 years ago but did not complete the treatment, currently not in treatment  - nausea/vomiting on admission 12/21, currently resolved  - denied recent use of alcohol, CIWA 0; denied use of other substances.   - patient counseled on the risks of prolonged consistent exposure to cannabis use with symptoms such as hyperemesis, worsening anxiety, paranoia, sleep irregularities, gastritis, blurred vision, headache.  - diagnosis of CHS is based off of modified [place] IV criteria with greater than 3 episodes a year, duration of >1 year and frequency of use 4 or more times per week and resolution of symptoms after a period of 6 months free from cannabis or after a period of time noting 3 typical cycles of symptoms. Reviewed with patient that he is recommended to titrate off cannabis to better assess his health and to continue to work on his goal of getting into the .  - [facility] following, patient has motivation to cut down on smoking with goals of enrolling in the  someday, will coordinate with [facility] team counsellors to connect to our CDOP. CATCH counselor will assist with scheduling outpatient appt. [facility] at [address] [place] [address]  ([phone]).     #ADHD, remote  - was on Adderall when 8-10 yo, not needing treatment for 20 yrs    Addiction Medicine will sign off. Please re-consult if questions arise.

## 2024-12-23 NOTE — DIETITIAN INITIAL EVALUATION ADULT - PERTINENT MEDS FT
MEDICATIONS  (STANDING):  folic acid 1 milliGRAM(s) Oral daily  influenza   Vaccine 0.5 milliLiter(s) IntraMuscular once  lactated ringers. 1000 milliLiter(s) (75 mL/Hr) IV Continuous <Continuous>  LORazepam     Tablet 1 milliGRAM(s) Oral once  pantoprazole    Tablet 40 milliGRAM(s) Oral before breakfast  thiamine 100 milliGRAM(s) Oral daily    MEDICATIONS  (PRN):  acetaminophen     Tablet .. 650 milliGRAM(s) Oral every 6 hours PRN Mild Pain (1 - 3)  albuterol    90 MICROgram(s) HFA Inhaler 1 Puff(s) Inhalation four times a day PRN Bronchospasm  LORazepam     Tablet 2 milliGRAM(s) Oral every 2 hours PRN CIWA-Ar score increase by 2 points and a total score of 7 or less  ondansetron Injectable 4 milliGRAM(s) IV Push three times a day PRN Nausea and/or Vomiting

## 2024-12-23 NOTE — DISCHARGE NOTE PROVIDER - HOSPITAL COURSE
29-year-old male with a past medical history of alcohol use disorder, chronic marijuana use, and asthma on albuterol inhalers presented for vomiting and severe tremors. Patient presented yesterday to the ED the day prior to admission for vomiting and abdominal pain; CT-abdomen/pelvis obtained at that time showed possible signs of colitis. Pt was prescribed  Augmentin by the ED and was discharged. On the day of admission, patient reported  excessive sweating, tremors, vomiting, palpitations, and dizziness.     In the ED: T: 36.8   BP: 128/80  HR: 109 RR: 18   Spo2: 98% on room air.   Labs: WBC 15.922 (22.44 yesterday)    Lac: 2.8--->1.8 after fluids  CT abdomen/pelvis: No abnormal bowel dilation or wall thickening. Post appendectomy. No ascites. No CT evidence of acute abdominopelvic pathology.    Patient received metronidazole, cefepime, lorazepam (for which he improved afterwards) in the ED. Pt was admitted to medicine for further management.     While admitted, patient's symptoms improved with Zofran, and PRN ativan. Patient's Lactic acidosis improved from 2.9 on admission to 1.8 with administration of IVF. Patient now reports complete symptomatic improvement, and is medically clear for discharge home.     Discussion of discharge plan of care, including discharge diagnoses, medication reconciliation, and follow-ups, was conducted with Dr. Matt on 12/23/2024, and discharge was approved. 29-year-old male with a past medical history of alcohol use disorder, chronic marijuana use, and asthma on albuterol inhalers presented for vomiting and severe tremors. Patient presented yesterday to the ED the day prior to admission for vomiting and abdominal pain; CT-abdomen/pelvis obtained at that time showed possible signs of colitis. Pt was prescribed  Augmentin by the ED and was discharged. On the day of admission, patient reported  excessive sweating, tremors, vomiting, palpitations, and dizziness.     In the ED: T: 36.8   BP: 128/80  HR: 109 RR: 18   Spo2: 98% on room air.   Labs: WBC 15.922 (22.44 yesterday)    Lac: 2.8--->1.8 after fluids  CT abdomen/pelvis: No abnormal bowel dilation or wall thickening. Post appendectomy. No ascites. No CT evidence of acute abdominopelvic pathology.    Patient received metronidazole, cefepime, lorazepam (for which he improved afterwards) in the ED. Pt was admitted to medicine for further management.     While admitted, patient's symptoms improved with Zofran, and PRN ativan. Patient's Lactic acidosis improved from 2.9 on admission to 1.8 with administration of IVF. Patient now reports complete symptomatic improvement, and is medically clear for discharge home.     # Lactic acidosis -resolved  # Reactive leucocytosis- resolving  # Cannabis abuse  # Possible  alcohol withdrawal  -  CT Abdomen and Pelvis w/ IV Cont (12.21.24 @ 12:43) >No CT evidence of acute abdominopelvic pathology.  - Alcohol, Blood: <10 mg/dL (12.21.24 @ 20:47)  - THC, Urine Qualitative: Positive (12.22.24 @ 16:56)  - S/p ativan prn  - c/w thiamine ,folate  - zofran prn  - c/w protonix      # Asthma- stable   - albuterol prn     29-year-old male with a past medical history of alcohol use disorder, chronic marijuana use, and asthma on albuterol inhalers presented for vomiting and severe tremors. Patient presented yesterday to the ED the day prior to admission for vomiting and abdominal pain; CT-abdomen/pelvis obtained at that time showed possible signs of colitis. Pt was prescribed  Augmentin by the ED and was discharged. On the day of admission, patient reported  excessive sweating, tremors, vomiting, palpitations, and dizziness.     In the ED: T: 36.8   BP: 128/80  HR: 109 RR: 18   Spo2: 98% on room air.   Labs: WBC 15.922 (22.44 yesterday)    Lac: 2.8--->1.8 after fluids  CT abdomen/pelvis: No abnormal bowel dilation or wall thickening. Post appendectomy. No ascites. No CT evidence of acute abdominopelvic pathology.    Patient received metronidazole, cefepime, lorazepam (for which he improved afterwards) in the ED. Pt was admitted to medicine for further management.     While admitted, patient's symptoms improved with Zofran, and PRN ativan. Patient's Lactic acidosis improved from 2.9 on admission to 1.8 with administration of IVF. Patient now reports complete symptomatic improvement, and is medically clear for discharge home.     #Cannabis Use Disorder  #Cannabinoid Hyperemesis Syndrome  # Lactic acidosis -resolved  # Reactive leucocytosis- resolving  -  CT Abdomen and Pelvis w/ IV Cont (12.21.24 @ 12:43) >No CT evidence of acute abdominopelvic pathology.  - Alcohol, Blood: <10 mg/dL (12.21.24 @ 20:47)  - THC, Urine Qualitative: Positive (12.22.24 @ 16:56)  - S/p ativan prn  - c/w protonix  - evaluated by addiction medicine      # Asthma- stable   - albuterol prn

## 2024-12-23 NOTE — DIETITIAN INITIAL EVALUATION ADULT - ENERGY INTAKE
Pt reports good appetite and PO intake on admission. >75% intake of eggs and [place] toast.   Reports abd pain sx resolved on admission. Tolerating meals well.

## 2024-12-23 NOTE — DIETITIAN INITIAL EVALUATION ADULT - COLLABORATION WITH OTHER PROVIDERS
Intervention: meals and snacks, coordination of care; RN/resident  Monitoring/Evaluation: diet order, energy intake, weights, labs, GI s/s,  BG, skin status, NFPE

## 2024-12-23 NOTE — DIETITIAN INITIAL EVALUATION ADULT - OTHER INFO
29 year old man with a past medical history of alcohol use disorder, chronic marijuana use, and asthma on albuterol inhalers presented for Vomiting and severe tremors. Patient presented yesterday to the ED for vomiting and abdominal pain where he underwent a CT-abdomen/pelvis that showed possible signs of colitis. He reported that he ate chinese food from a take away restaurant yesterday and he started having abdominal pain and vomiting shortly afterwards.    # Lactic acidosis -resolved  # Reactive leucocytosis  # Cannabis abuse  # Possible  alcohol withdrawal

## 2024-12-23 NOTE — DISCHARGE NOTE NURSING/CASE MANAGEMENT/SOCIAL WORK - NSDCPEFALRISK_GEN_ALL_CORE
For information on Fall & Injury Prevention, visit: [email or web address] OR  [email or web address] OR  https://www.cdc.gov/steadi/patient.html

## 2024-12-23 NOTE — DISCHARGE NOTE PROVIDER - NSDCCPCAREPLAN_GEN_ALL_CORE_FT
PRINCIPAL DISCHARGE DIAGNOSIS  Diagnosis: High serum lactate  Assessment and Plan of Treatment: You were admitted to the hospital because the levels of lactate in your blood were high. Lactate is a substance your body makes when your cells don't get enough oxygen to produce energy. High amounts of lactate in your blood can mean your body is struggling to deliver enough oxygen to your tissues.   The likely cause of your elevated lactate levels was due to vomiting, which consequently can result in dehydration and decreased blood flow to your tissues. To treat this, we gave you IV fluids, and your lactate levels resolved.   If you experience worsening vomitting, diarrhea, or abdominal pain, please seek immediate medical care.        PRINCIPAL DISCHARGE DIAGNOSIS  Diagnosis: Cannabis hyperemesis syndrome concurrent with and due to cannabis abuse  Assessment and Plan of Treatment: You were admitted to the hospital because the levels of lactate in your blood were high. Lactate is a substance your body makes when your cells don't get enough oxygen to produce energy. High amounts of lactate in your blood can mean your body is struggling to deliver enough oxygen to your tissues.   The likely cause of your elevated lactate levels was due to vomiting, which consequently can result in dehydration and decreased blood flow to your tissues. To treat this, we gave you IV fluids, and your lactate levels resolved.   If you experience worsening vomitting, diarrhea, or abdominal pain, please seek immediate medical care.

## 2024-12-23 NOTE — PROGRESS NOTE ADULT - ATTENDING COMMENTS
I saw and evaluated the patient today 12-23-24 with resident during key/critical portions of the visit.  Nursing/primary team/consultant records reviewed. I agree with the resident's note including past psych history, home medications, social history, allergies, surgical history, family history, and review of system. I have reviewed relevant vitals, laboratory values, imaging studies, and microbiology.  I agree with the trainee's findings and assessment and plan as documented in the trainee's note.   - Above resident's note was edited by me   - agree with rest of A/P as per detailed resident note, unless noted below.

## 2024-12-23 NOTE — DIETITIAN INITIAL EVALUATION ADULT - PERTINENT LABORATORY DATA
12-23    139  |  98  |  13  ----------------------------<  65[L]  3.8   |  25  |  0.9    Ca    9.3      23 Dec 2024 05:28  Phos  3.3     12-22  Mg     2.3     12-22    TPro  6.8  /  Alb  4.4  /  TBili  1.2  /  DBili  x   /  AST  32  /  ALT  26  /  AlkPhos  78  12-22

## 2024-12-23 NOTE — DISCHARGE NOTE NURSING/CASE MANAGEMENT/SOCIAL WORK - PATIENT PORTAL LINK FT
You can access the FollowMyHealth Patient Portal offered by [facility] by registering at the following website: [email or web address]. By joining [employer]’s FollowMyHealth portal, you will also be able to view your health information using other applications (apps) compatible with our system.

## 2024-12-23 NOTE — PROGRESS NOTE ADULT - SUBJECTIVE AND OBJECTIVE BOX
Pt interviewed, examined and EMR chart reviewed.    30 yo domiciled male with a past medical history of alcohol use disorder, chronic marijuana use, ADHD, and asthma on albuterol inhalers presented for vomiting and severe tremors. Patient was admitted for workup for suspected colitis, which was eventually ruled out. Addiction Medicine consulted for substance use.    Patient reported that on 12/21 he decided to come to the hospital because he woke up having nausea, vomiting, sweating and tremors. The nausea/vomiting has subsided by today, he was still reporting residual tremors and headaches. Patient reported having been using marijuana since he was 17, "not daily but most of the days," using 10-15 joints per day. He reported that 2 yrs ago he was referred to [facility] in  after presented to [facility] with similar episode of nausea/vomiting, but he said he stayed at the rehab for only 1 day because he did not like the people around him. He endorsed having a max 2 month of sobriety when he was 21 yo when he tried to join the . Reported that he liked marijuana because it helped him sleep and eat. Reported wanting to cut down because it gave him headaches and low energy. Patient is motivated to cut down on marijuana use.    Endorsed occasional alcohol use, last use was 6 months ago. Denied benzodiazepines, cocaine and other amphetamines, cannabinoids, ectasy/anurag, GHB, ketamine, PCP, inhalants or misuse of other illicit substances, supplements or prescriptions/medications.     Patient denies increased anxiety, depression, SI/HI, AH/VH. Endorsed having ADHD as a kid, was on Adderall when he was 8-10 yo but stopped because it made him have VH. Currently not in treatment.    [facility] ISTOP: none #[id number]     SOCIAL HISTORY: domiciled with family, finished high school, working various short-term jobs    REVIEW OF SYSTEMS: per HPI     MEDICATIONS  (STANDING):  folic acid 1 milliGRAM(s) Oral daily  influenza   Vaccine 0.5 milliLiter(s) IntraMuscular once  lactated ringers. 1000 milliLiter(s) (75 mL/Hr) IV Continuous <Continuous>  LORazepam     Tablet 1 milliGRAM(s) Oral once  pantoprazole    Tablet 40 milliGRAM(s) Oral before breakfast  thiamine 100 milliGRAM(s) Oral daily    MEDICATIONS  (PRN):  acetaminophen     Tablet .. 650 milliGRAM(s) Oral every 6 hours PRN Mild Pain (1 - 3)  albuterol    90 MICROgram(s) HFA Inhaler 1 Puff(s) Inhalation four times a day PRN Bronchospasm  LORazepam     Tablet 2 milliGRAM(s) Oral every 2 hours PRN CIWA-Ar score increase by 2 points and a total score of 7 or less  ondansetron Injectable 4 milliGRAM(s) IV Push three times a day PRN Nausea and/or Vomiting      Vital Signs Last 24 Hrs  T(C): 36.8 (23 Dec 2024 08:42), Max: 36.8 (23 Dec 2024 08:42)  T(F): 98.2 (23 Dec 2024 08:42), Max: 98.2 (23 Dec 2024 08:42)  HR: 79 (23 Dec 2024 08:42) (71 - 87)  BP: 118/80 (23 Dec 2024 08:42) (110/61 - 138/88)  BP(mean): --  RR: 18 (23 Dec 2024 08:42) (18 - 18)  SpO2: 97% (23 Dec 2024 08:42) (96% - 98%)    Parameters below as of 23 Dec 2024 08:42  Patient On (Oxygen Delivery Method): room air    PHYSICAL EXAM:    Constitutional: NAD, well-groomed, well-developed  HEENT: PERRLA, EOMI  Neck: No LAD, No JVD  Respiratory: no increased work of breathing  Cardiovascular: S1 and S2, RRR  Gastrointestinal: soft, NT/ND  Extremities: No peripheral edema  Neurological: A/O x 3, no focal deficits    MENTAL STATUS EXAM:  Appearance: calm   Appearance in relation to age: looks stated age      Hygiene/Grooming: fair grooming   Attitude toward examiner: fully cooperative  Alertness: alert  Orientation: oriented to time, place and person  Posture: sitting in chair  Gait: not evaluated  Behavior and psychomotor activity: normal  Mood: euthymic  Affect: full range and reactivity      Speech: fluent and spontaneous; normal rate, rhythm, volume and tone   Perceptions: denies hallucinations  Thought process: logical, linear and goal-directed    Thought content: no delusions or particular preoccupation, denies SI/HI  Associations: no loosening of associations   Impulse Control: aware of socially acceptable behavior  Insight: aware of need to change behaviors to remain sober  Judgment: understands consequences of drug use    LABS:                        17.2   11.69 )-----------( 343      ( 23 Dec 2024 05:28 )             47.1     12-23    139  |  98  |  13  ----------------------------<  65[L]  3.8   |  25  |  0.9    Ca    9.3      23 Dec 2024 05:28  Phos  3.3     12-22  Mg     2.3     12-22    TPro  6.8  /  Alb  4.4  /  TBili  1.2  /  DBili  x   /  AST  32  /  ALT  26  /  AlkPhos  78  12-22    PT/INR - ( 21 Dec 2024 12:38 )   PT: 12.20 sec;   INR: 1.03 ratio         PTT - ( 21 Dec 2024 12:38 )  PTT:26.2 sec  Urinalysis Basic - ( 23 Dec 2024 05:28 )    Color: x / Appearance: x / SG: x / pH: x  Gluc: 65 mg/dL / Ketone: x  / Bili: x / Urobili: x   Blood: x / Protein: x / Nitrite: x   Leuk Esterase: x / RBC: x / WBC x   Sq Epi: x / Non Sq Epi: x / Bacteria: x    Drug Screen Urine:  Alcohol Level  Alcohol, Blood: <10 mg/dL (12-21-24 @ 20:47)    Propoxyphene - Urine (12.22.24 @ 16:56)   Propoxyphene Qualitative Urine Result: Negative  Fentanyl, Urine (12.22.24 @ 16:56)   Fentanyl, Ur: Negative: Interpretation of result   Oxycodone, Urine (12.22.24 @ 16:56)   Oxycodone, Urine: Negative: Interpretation of result   Phencyclidine Level, Urine (12.22.24 @ 16:56)   Phencyclidine Level, Urine: Negative  THC, Urine Qualitative (12.22.24 @ 16:56)   THC, Urine Qualitative: Positive  Methadone, Urine (12.22.24 @ 16:56)   Methadone, Urine: Negative: Interpretation of result   Barbiturates Screen, Urine (12.22.24 @ 16:56)   Barbiturates Screen, Urine: Negative  Opiate, Urine (12.22.24 @ 16:56)   Opiate, Urine: Negative  Cocaine Metabolite, Urine (12.22.24 @ 16:56)   Cocaine Metabolite, Urine: Negative  Benzodiazepine, Urine (12.22.24 @ 16:56)   Benzodiazepine, Urine: Positive  Amphetamine, Urine (12.22.24 @ 16:56)   Amphetamine, Urine: Negative      RADIOLOGY & ADDITIONAL STUDIES:    PROCEDURE DATE:  12/21/2024    INTERPRETATION:  CLINICAL STATEMENT: Abdominal pain.  COMPARISON CT: CT abdomen and pelvis 12/20/2024.    OTHER STUDIES USED FOR CORRELATION: None.    FINDINGS:    LOWER CHEST: Unremarkable.  HEPATOBILIARY: Diffuse hepatic steatosis. Vicarious contrast media   extravasation in the gallbladder.  SPLEEN: Unremarkable.  PANCREAS: Unremarkable.  ADRENAL GLANDS: Unremarkable.  KIDNEYS: Unremarkable.  ABDOMINOPELVIC NODES: Unremarkable.  PELVIC ORGANS: Unremarkable.  PERITONEUM/MESENTERY/BOWEL: No abnormal bowel dilation or wall   thickening. Post appendectomy. No ascites.  BONES/SOFT TISSUES: No acute osseous abnormality.  OTHER: Normal caliber abdominal aorta.    IMPRESSION:    No CT evidence of acute abdominopelvic pathology.    --- End of Report ---    
Patient is a 29y old  Male who presents with a chief complaint of Vomiting and severe tremors    Patient was seen and examined.  Denies abd pain , nausea, Vomiting today  Pt says he has mild  hand tremors at baseline  Uses cannabis almost daily  Denies any other complaints.  All systems reviewed positive history as mentioned above.    PAST MEDICAL & SURGICAL HISTORY:  Asthma    History of appendectomy    Allergies  No Known Drug Allergies  shellfish (Rash; Urticaria; Hives)    MEDICATIONS  (STANDING):  folic acid 1 milliGRAM(s) Oral daily  influenza   Vaccine 0.5 milliLiter(s) IntraMuscular once  lactated ringers. 1000 milliLiter(s) (75 mL/Hr) IV Continuous <Continuous>  thiamine 100 milliGRAM(s) Oral daily    MEDICATIONS  (PRN):  acetaminophen     Tablet .. 650 milliGRAM(s) Oral every 6 hours PRN Mild Pain (1 - 3)  albuterol    90 MICROgram(s) HFA Inhaler 1 Puff(s) Inhalation four times a day PRN Bronchospasm  LORazepam     Tablet 2 milliGRAM(s) Oral every 2 hours PRN CIWA-Ar score increase by 2 points and a total score of 7 or less  ondansetron Injectable 4 milliGRAM(s) IV Push three times a day PRN Nausea and/or Vomiting    Vital Signs Last 24 Hrs  T(C): 37  T(F): 98.6  HR: 83  BP: 120/58  BP(mean): --  RR: 18  SpO2: 95%    O/E:  Awake, alert, not in distress.  HEENT: atraumatic, EOMI.  Chest: clear.  CVS: SIS2 +, no murmur.  P/A: Soft, BS+  CNS: awake, alert, b/l hand tremors+  Ext: no edema feet.  All systems reviewed positive findings as above.                        15.9   13.33[H] )-----------( 323      ( 22 Dec 2024 05:49 )             44.6                         16.5   15.92[H] )-----------( 364      ( 21 Dec 2024 12:38 )             45.0     12-22    137  |  99  |  11  ----------------------------<  74  3.6   |  24  |  0.8  12-22    136  |  100  |  13  ----------------------------<  80  3.6   |  23  |  0.8    Ca    9.5      22 Dec 2024 05:49  Ca    8.8      22 Dec 2024 00:49  Ca    9.1      21 Dec 2024 20:47  Ca    9.8      21 Dec 2024 12:38  Phos  3.3     12-22  Mg     2.3     12-22    TPro  6.8  /  Alb  4.4  /  TBili  1.2  /  DBili  x   /  AST  32  /  ALT  26  /  AlkPhos  78  12-22  TPro  6.4  /  Alb  4.1  /  TBili  0.9  /  DBili  0.3  /  AST  32  /  ALT  26  /  AlkPhos  70  12-22  TPro  6.4  /  Alb  4.2  /  TBili  1.1  /  DBili  0.3  /  AST  31  /  ALT  22  /  AlkPhos  73  12-21  TPro  7.3  /  Alb  4.6  /  TBili  1.1  /  DBili  x   /  AST  35  /  ALT  27  /  AlkPhos  85  12-21          PT/INR - ( 21 Dec 2024 12:38 )   PT: 12.20 sec;   INR: 1.03 ratio         PTT - ( 21 Dec 2024 12:38 )  PTT:26.2 sec  Urinalysis Basic - ( 22 Dec 2024 05:49 )    Color: x / Appearance: x / SG: x / pH: x  Gluc: 74 mg/dL / Ketone: x  / Bili: x / Urobili: x   Blood: x / Protein: x / Nitrite: x   Leuk Esterase: x / RBC: x / WBC x   Sq Epi: x / Non Sq Epi: x / Bacteria: x        Urinalysis with Rflx Culture (collected 21 Dec 2024 12:38)

## 2024-12-23 NOTE — DISCHARGE NOTE PROVIDER - NSDCQMSTAIRS_GEN_ALL_CORE
No 41-year-old male presents for suture removal.  Sutures placed in ED 10 days ago.  Sutures intact, healing well

## 2024-12-23 NOTE — DIETITIAN INITIAL EVALUATION ADULT - ADD RECOMMEND
1. Continue with current diet order  2. Encourage PO intake at meal times and obtain food preferences    Pt at moderate risk

## 2024-12-23 NOTE — DISCHARGE NOTE NURSING/CASE MANAGEMENT/SOCIAL WORK - FINANCIAL ASSISTANCE
[facility] provides services at a reduced cost to those who are determined to be eligible through [facility]’s financial assistance program. Information regarding [facility]’s financial assistance program can be found by going to [email or web address] or by calling [phone].

## 2024-12-23 NOTE — DISCHARGE NOTE PROVIDER - TIME SPENT: (MINUTES SPENT ON THE DISCHARGE SERVICE)
[de-identified] : Bilateral profound HL, mixed in at least one ear\par Type B tympanograms bilaterally,  consistent with conductive pathology\par 
41

## 2024-12-23 NOTE — DIETITIAN INITIAL EVALUATION ADULT - EDUCATION DIETARY MODIFICATIONS
Encouraged PO intake, reviewed oral nutrition supplements - pt declines at this time/(1) partially meets; needs review/practice/verbalization

## 2024-12-23 NOTE — DIETITIAN INITIAL EVALUATION ADULT - ORAL INTAKE PTA/DIET HISTORY
Pt reports fair PO intake PTA; states sometimes he skips breakfast and will have lunch and dinner meals. No diet restrictions/cultural diets. Reports seafood allergy. Not vitamin/supplement use reported. Reports unsure of wt loss; UBW ~175 lb taken 1 month ago.     Wt hx per [facility] Tab:  8/14/24: 77.1 kg vs CBW on admission 77.1 kg  - with no weight loss noted     Pt reports fair PO intake PTA; states sometimes he skips breakfast and will have lunch and dinner meals. No diet restrictions/cultural diets. Reports seafood allergy. Pt takes Vit D at home. Reports unsure of wt loss; UBW ~175 lb taken 1 month ago.     Wt hx per [facility] Tab:  8/14/24: 77.1 kg vs CBW on admission 77.1 kg  - with no weight loss noted

## 2024-12-23 NOTE — DISCHARGE NOTE PROVIDER - NSDCMRMEDTOKEN_GEN_ALL_CORE_FT
Albuterol (Eqv-ProAir HFA) 90 mcg/inh inhalation aerosol: 2 inhaled 4 times a day as needed for  bronchospasm  amoxicillin-clavulanate 875 mg-125 mg oral tablet: 875 milligram(s) orally 2 times a day   Albuterol (Eqv-ProAir HFA) 90 mcg/inh inhalation aerosol: 2 inhaled 4 times a day as needed for  bronchospasm   Albuterol (Eqv-ProAir HFA) 90 mcg/inh inhalation aerosol: 2 inhaled 4 times a day as needed for  bronchospasm  folic acid 1 mg oral tablet: 1 tab(s) orally once a day   Albuterol (Eqv-ProAir HFA) 90 mcg/inh inhalation aerosol: 2 inhaled 4 times a day as needed for  bronchospasm  folic acid 1 mg oral tablet: 1 tab(s) orally once a day  folic acid 1 mg oral tablet: 1 tab(s) orally once a day  pantoprazole 40 mg oral delayed release tablet: 1 tab(s) orally once a day (before a meal)  thiamine 100 mg oral tablet: 1 tab(s) orally once a day

## 2024-12-23 NOTE — DISCHARGE NOTE PROVIDER - CARE PROVIDER_API CALL
Alfonso Parada  Internal Medicine  [address] [place] [address], Admin - Room [address] [place] [address]  Phone: [phone]  Fax: [phone]  Follow Up Time: 2 weeks

## 2024-12-26 ENCOUNTER — EMERGENCY (EMERGENCY)
Facility: HOSPITAL | Age: 29
LOS: 0 days | Discharge: HOME | End: 2024-12-27
Attending: EMERGENCY MEDICINE
Payer: MEDICAID

## 2024-12-26 VITALS
HEART RATE: 79 BPM | TEMPERATURE: 98 F | RESPIRATION RATE: 20 BRPM | OXYGEN SATURATION: 100 % | DIASTOLIC BLOOD PRESSURE: 89 MMHG | SYSTOLIC BLOOD PRESSURE: 155 MMHG

## 2024-12-26 DIAGNOSIS — Z90.49 ACQUIRED ABSENCE OF OTHER SPECIFIED PARTS OF DIGESTIVE TRACT: Chronic | ICD-10-CM

## 2024-12-26 LAB
CULTURE RESULTS: SIGNIFICANT CHANGE UP
CULTURE RESULTS: SIGNIFICANT CHANGE UP
SPECIMEN SOURCE: SIGNIFICANT CHANGE UP
SPECIMEN SOURCE: SIGNIFICANT CHANGE UP

## 2024-12-26 PROCEDURE — 99285 EMERGENCY DEPT VISIT HI MDM: CPT | Mod: 25

## 2024-12-26 PROCEDURE — 80053 COMPREHEN METABOLIC PANEL: CPT

## 2024-12-26 PROCEDURE — 96372 THER/PROPH/DIAG INJ SC/IM: CPT | Mod: XU

## 2024-12-26 PROCEDURE — 93005 ELECTROCARDIOGRAM TRACING: CPT

## 2024-12-26 PROCEDURE — 71045 X-RAY EXAM CHEST 1 VIEW: CPT

## 2024-12-26 PROCEDURE — 96374 THER/PROPH/DIAG INJ IV PUSH: CPT

## 2024-12-26 PROCEDURE — 36415 COLL VENOUS BLD VENIPUNCTURE: CPT

## 2024-12-26 PROCEDURE — 96375 TX/PRO/DX INJ NEW DRUG ADDON: CPT

## 2024-12-26 PROCEDURE — 99285 EMERGENCY DEPT VISIT HI MDM: CPT

## 2024-12-26 PROCEDURE — 83735 ASSAY OF MAGNESIUM: CPT

## 2024-12-26 PROCEDURE — 85025 COMPLETE CBC W/AUTO DIFF WBC: CPT

## 2024-12-26 PROCEDURE — 83690 ASSAY OF LIPASE: CPT

## 2024-12-27 VITALS
RESPIRATION RATE: 18 BRPM | SYSTOLIC BLOOD PRESSURE: 113 MMHG | HEART RATE: 98 BPM | OXYGEN SATURATION: 100 % | DIASTOLIC BLOOD PRESSURE: 76 MMHG | TEMPERATURE: 98 F

## 2024-12-27 LAB
ALBUMIN SERPL ELPH-MCNC: 4.2 G/DL — SIGNIFICANT CHANGE UP (ref 3.5–5.2)
ALP SERPL-CCNC: 82 U/L — SIGNIFICANT CHANGE UP (ref 30–115)
ALT FLD-CCNC: 20 U/L — SIGNIFICANT CHANGE UP (ref 0–41)
ANION GAP SERPL CALC-SCNC: 15 MMOL/L — HIGH (ref 7–14)
AST SERPL-CCNC: 20 U/L — SIGNIFICANT CHANGE UP (ref 0–41)
BASOPHILS # BLD AUTO: 0.07 K/UL — SIGNIFICANT CHANGE UP (ref 0–0.2)
BASOPHILS NFR BLD AUTO: 0.6 % — SIGNIFICANT CHANGE UP (ref 0–1)
BILIRUB SERPL-MCNC: 0.5 MG/DL — SIGNIFICANT CHANGE UP (ref 0.2–1.2)
BUN SERPL-MCNC: 12 MG/DL — SIGNIFICANT CHANGE UP (ref 10–20)
CALCIUM SERPL-MCNC: 9.1 MG/DL — SIGNIFICANT CHANGE UP (ref 8.4–10.5)
CHLORIDE SERPL-SCNC: 101 MMOL/L — SIGNIFICANT CHANGE UP (ref 98–110)
CO2 SERPL-SCNC: 19 MMOL/L — SIGNIFICANT CHANGE UP (ref 17–32)
CREAT SERPL-MCNC: 0.8 MG/DL — SIGNIFICANT CHANGE UP (ref 0.7–1.5)
EGFR: 124 ML/MIN/1.73M2 — SIGNIFICANT CHANGE UP
EOSINOPHIL # BLD AUTO: 0.05 K/UL — SIGNIFICANT CHANGE UP (ref 0–0.7)
EOSINOPHIL NFR BLD AUTO: 0.4 % — SIGNIFICANT CHANGE UP (ref 0–8)
GLUCOSE SERPL-MCNC: 108 MG/DL — HIGH (ref 70–99)
HCT VFR BLD CALC: 45.7 % — SIGNIFICANT CHANGE UP (ref 42–52)
HGB BLD-MCNC: 16.8 G/DL — SIGNIFICANT CHANGE UP (ref 14–18)
IMM GRANULOCYTES NFR BLD AUTO: 0.4 % — HIGH (ref 0.1–0.3)
LIDOCAIN IGE QN: 19 U/L — SIGNIFICANT CHANGE UP (ref 7–60)
LYMPHOCYTES # BLD AUTO: 1.42 K/UL — SIGNIFICANT CHANGE UP (ref 1.2–3.4)
LYMPHOCYTES # BLD AUTO: 12.2 % — LOW (ref 20.5–51.1)
MAGNESIUM SERPL-MCNC: 1.7 MG/DL — LOW (ref 1.8–2.4)
MCHC RBC-ENTMCNC: 30.3 PG — SIGNIFICANT CHANGE UP (ref 27–31)
MCHC RBC-ENTMCNC: 36.8 G/DL — SIGNIFICANT CHANGE UP (ref 32–37)
MCV RBC AUTO: 82.5 FL — SIGNIFICANT CHANGE UP (ref 80–94)
MONOCYTES # BLD AUTO: 0.76 K/UL — HIGH (ref 0.1–0.6)
MONOCYTES NFR BLD AUTO: 6.5 % — SIGNIFICANT CHANGE UP (ref 1.7–9.3)
NEUTROPHILS # BLD AUTO: 9.31 K/UL — HIGH (ref 1.4–6.5)
NEUTROPHILS NFR BLD AUTO: 79.9 % — HIGH (ref 42.2–75.2)
NRBC # BLD: 0 /100 WBCS — SIGNIFICANT CHANGE UP (ref 0–0)
PLATELET # BLD AUTO: 392 K/UL — SIGNIFICANT CHANGE UP (ref 130–400)
PMV BLD: 8.9 FL — SIGNIFICANT CHANGE UP (ref 7.4–10.4)
POTASSIUM SERPL-MCNC: 4.4 MMOL/L — SIGNIFICANT CHANGE UP (ref 3.5–5)
POTASSIUM SERPL-SCNC: 4.4 MMOL/L — SIGNIFICANT CHANGE UP (ref 3.5–5)
PROT SERPL-MCNC: 6.6 G/DL — SIGNIFICANT CHANGE UP (ref 6–8)
RBC # BLD: 5.54 M/UL — SIGNIFICANT CHANGE UP (ref 4.7–6.1)
RBC # FLD: 12.9 % — SIGNIFICANT CHANGE UP (ref 11.5–14.5)
SODIUM SERPL-SCNC: 135 MMOL/L — SIGNIFICANT CHANGE UP (ref 135–146)
WBC # BLD: 11.66 K/UL — HIGH (ref 4.8–10.8)
WBC # FLD AUTO: 11.66 K/UL — HIGH (ref 4.8–10.8)

## 2024-12-27 PROCEDURE — 71045 X-RAY EXAM CHEST 1 VIEW: CPT | Mod: 26

## 2024-12-27 PROCEDURE — 93010 ELECTROCARDIOGRAM REPORT: CPT

## 2024-12-27 RX ORDER — SODIUM CHLORIDE 9 MG/ML
1000 INJECTION, SOLUTION INTRAMUSCULAR; INTRAVENOUS; SUBCUTANEOUS ONCE
Refills: 0 | Status: COMPLETED | OUTPATIENT
Start: 2024-12-27 | End: 2024-12-27

## 2024-12-27 RX ORDER — IOHEXOL 300 MG/ML
30 INJECTION, SOLUTION INTRAVENOUS ONCE
Refills: 0 | Status: DISCONTINUED | OUTPATIENT
Start: 2024-12-27 | End: 2024-12-27

## 2024-12-27 RX ORDER — ONDANSETRON HYDROCHLORIDE 4 MG/1
4 TABLET, FILM COATED ORAL ONCE
Refills: 0 | Status: COMPLETED | OUTPATIENT
Start: 2024-12-27 | End: 2024-12-27

## 2024-12-27 RX ORDER — HALOPERIDOL 2 MG
5 TABLET ORAL ONCE
Refills: 0 | Status: COMPLETED | OUTPATIENT
Start: 2024-12-27 | End: 2024-12-27

## 2024-12-27 RX ORDER — LORAZEPAM 2 MG/1
2 TABLET ORAL ONCE
Refills: 0 | Status: DISCONTINUED | OUTPATIENT
Start: 2024-12-27 | End: 2024-12-27

## 2024-12-27 RX ORDER — SODIUM CHLORIDE 9 MG/ML
2000 INJECTION, SOLUTION INTRAMUSCULAR; INTRAVENOUS; SUBCUTANEOUS ONCE
Refills: 0 | Status: COMPLETED | OUTPATIENT
Start: 2024-12-27 | End: 2024-12-27

## 2024-12-27 RX ADMIN — Medication 25 GRAM(S): at 02:12

## 2024-12-27 RX ADMIN — SODIUM CHLORIDE 1000 MILLILITER(S): 9 INJECTION, SOLUTION INTRAMUSCULAR; INTRAVENOUS; SUBCUTANEOUS at 05:14

## 2024-12-27 RX ADMIN — ONDANSETRON HYDROCHLORIDE 4 MILLIGRAM(S): 4 TABLET, FILM COATED ORAL at 00:34

## 2024-12-27 RX ADMIN — Medication 5 MILLIGRAM(S): at 01:25

## 2024-12-27 RX ADMIN — SODIUM CHLORIDE 2000 MILLILITER(S): 9 INJECTION, SOLUTION INTRAMUSCULAR; INTRAVENOUS; SUBCUTANEOUS at 00:34

## 2024-12-27 RX ADMIN — LORAZEPAM 2 MILLIGRAM(S): 2 TABLET ORAL at 01:25

## 2024-12-27 NOTE — ED PROVIDER NOTE - ATTENDING APP SHARED VISIT CONTRIBUTION OF CARE
29-year-old male above past medical history returns to ED with nausea and nonbloody nonbilious vomiting and a shaky sensation, last drink yesterday denies history of withdrawal symptoms, has resumed marijuana smoking, is passing flatus and stool, denies  symptoms, on exam vitals appreciated, tremulous and retching but nontoxic, dry mucous membranes, abdomen soft nontender nondistended, will check labs, treat symptoms, reassess

## 2024-12-27 NOTE — ED PROVIDER NOTE - OBJECTIVE STATEMENT
29-year-old male with past medical history EtOH use disorder, chronic marijuana use, asthma on albuterol presents with complaint of vomiting.  Patient was admitted 12/21 - 12/23 for hyperemesis.  States after discharge she came home and began smoking marijuana once again.  States he has subsequently began experiencing symptoms of recurrent vomiting.  States emesis is nonbloody, however is bilious.  Endorses epigastric abdominal discomfort.  Denies fever, shortness of breath, diaphoresis, diarrhea, dysuria, change in bowel habits, melena/hematochezia.

## 2024-12-27 NOTE — ED PROVIDER NOTE - CLINICAL SUMMARY MEDICAL DECISION MAKING FREE TEXT BOX
Patient with history of hyperemesis status post multiple imaging studies on admission last week returns with similar symptoms, exam as above, labs appreciated, patient hydrated and medicated, slept, feels much better, is p.o. tolerant, abdomen remains soft, will discharge with lifestyle modification and outpatient follow-up, counseled reguarding conditions which should prompt return.

## 2024-12-27 NOTE — ED PROVIDER NOTE - PATIENT PORTAL LINK FT
You can access the FollowMyHealth Patient Portal offered by [facility] by registering at the following website: [email or web address]. By joining [employer]’s FollowMyHealth portal, you will also be able to view your health information using other applications (apps) compatible with our system.

## 2024-12-27 NOTE — ED PROVIDER NOTE - NSFOLLOWUPINSTRUCTIONS_ED_ALL_ED_FT
Follow-up with your PCP in 1-3 days.  Do not use marijuana.    Nausea / Vomiting    Nausea is the feeling that you have to vomit. As nausea gets worse, it can lead to vomiting. Vomiting puts you at an increased risk for dehydration. Older adults and people with other diseases or a weak immune system are at higher risk for dehydration. Drink clear fluids in small but frequent amounts as tolerated. Eat bland, easy-to-digest foods in small amounts as tolerated.    SEEK IMMEDIATE MEDICAL CARE IF YOU HAVE ANY OF THE FOLLOWING SYMPTOMS: fever, inability to keep sufficient fluids down, black or bloody vomitus, black or bloody stools, lightheadedness/dizziness, chest pain, severe headache, rash, shortness of breath, cold or clammy skin, confusion, pain with urination, or any signs of dehydration.

## 2024-12-31 DIAGNOSIS — F12.188 CANNABIS ABUSE WITH OTHER CANNABIS-INDUCED DISORDER: ICD-10-CM

## 2024-12-31 DIAGNOSIS — F17.200 NICOTINE DEPENDENCE, UNSPECIFIED, UNCOMPLICATED: ICD-10-CM

## 2024-12-31 DIAGNOSIS — Z23 ENCOUNTER FOR IMMUNIZATION: ICD-10-CM

## 2024-12-31 DIAGNOSIS — J45.909 UNSPECIFIED ASTHMA, UNCOMPLICATED: ICD-10-CM

## 2024-12-31 DIAGNOSIS — A41.9 SEPSIS, UNSPECIFIED ORGANISM: ICD-10-CM

## 2024-12-31 DIAGNOSIS — F12.99 CANNABIS USE, UNSPECIFIED WITH UNSPECIFIED CANNABIS-INDUCED DISORDER: ICD-10-CM

## 2024-12-31 DIAGNOSIS — E87.20 ACIDOSIS, UNSPECIFIED: ICD-10-CM

## 2024-12-31 DIAGNOSIS — R11.2 NAUSEA WITH VOMITING, UNSPECIFIED: ICD-10-CM
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
## 2024-12-31 DIAGNOSIS — F12.10 CANNABIS ABUSE, UNCOMPLICATED: ICD-10-CM

## 2024-12-31 LAB — DRUG SCREEN, SERUM: SIGNIFICANT CHANGE UP

## 2025-01-08 LAB
1OH-MIDAZOLAM UR CFM-MCNC: NEGATIVE NG/ML — SIGNIFICANT CHANGE UP
7AMINOCLONAZEPAM UR CFM-MCNC: NEGATIVE NG/ML — SIGNIFICANT CHANGE UP
ALPHA-HYDROXYALPRAZOLAM, UR RESULT: NEGATIVE NG/ML — SIGNIFICANT CHANGE UP
ALPRAZ UR CFM-MCNC: NEGATIVE NG/ML — SIGNIFICANT CHANGE UP
BENZODIAZ UR QL SCN: POSITIVE
CLONAZEPAM UR CFM-MCNC: NEGATIVE NG/ML — SIGNIFICANT CHANGE UP
DIAZEPAM UR CFM-MCNC: NEGATIVE NG/ML — SIGNIFICANT CHANGE UP
FLUNITRAZEPAM UR CFM-MCNC: NEGATIVE NG/ML — SIGNIFICANT CHANGE UP
FLURAZEPAM UR CFM-MCNC: NEGATIVE NG/ML — SIGNIFICANT CHANGE UP
LORAZEPAM UR CFM-MCNC: 695 NG/ML — HIGH
MIDAZOLAM UR CFM-MCNC: NEGATIVE NG/ML — SIGNIFICANT CHANGE UP
NORDIAZEPAM, UR RESULT: NEGATIVE NG/ML — SIGNIFICANT CHANGE UP
OXAZEPAM UR QL SCN: NEGATIVE NG/ML — SIGNIFICANT CHANGE UP
TEMAZEPAM UR CFM-MCNC: NEGATIVE NG/ML — SIGNIFICANT CHANGE UP

## 2025-01-10 LAB
CARBOXYTHC UR CFM-MCNC: 1045 NG/ML — HIGH
TOXICOLOGIST REVIEW: POSITIVE — SIGNIFICANT CHANGE UP

## 2025-01-29 NOTE — ED PROVIDER NOTE - CHIEF COMPLAINT
1/29/2025      Fercho Araya  [address] [facility] [address]      Dear Fercho Araya,                                                                                                       Analia Wallace MD wrote a referral for you to see a Behavioral Health provider. Our attempt to reach you by phone has been unsuccessful.     Please call the [facility] Scheduling Patient Line [phone]  at your earliest convenience. Let the  know that a behavioral health referral has been ordered and you are calling to discuss the referral.    We look forward to assisting you with your health care needs.    Sincerely,    [facility] Scheduling Patient Line [phone]        The patient is a 29y Male complaining of abdominal pain.

## 2025-02-07 ENCOUNTER — EMERGENCY (EMERGENCY)
Facility: HOSPITAL | Age: 30
LOS: 0 days | Discharge: HOME | End: 2025-02-07
Attending: EMERGENCY MEDICINE
Payer: MEDICAID

## 2025-02-07 ENCOUNTER — INPATIENT (INPATIENT)
Facility: HOSPITAL | Age: 30
LOS: 1 days | Discharge: HOME | DRG: 249 | End: 2025-02-09
Attending: STUDENT IN AN ORGANIZED HEALTH CARE EDUCATION/TRAINING PROGRAM | Admitting: INTERNAL MEDICINE
Payer: MEDICAID

## 2025-02-07 VITALS
TEMPERATURE: 98 F | RESPIRATION RATE: 22 BRPM | OXYGEN SATURATION: 97 % | DIASTOLIC BLOOD PRESSURE: 71 MMHG | SYSTOLIC BLOOD PRESSURE: 131 MMHG | HEART RATE: 62 BPM

## 2025-02-07 VITALS
SYSTOLIC BLOOD PRESSURE: 136 MMHG | HEART RATE: 98 BPM | RESPIRATION RATE: 17 BRPM | DIASTOLIC BLOOD PRESSURE: 82 MMHG | OXYGEN SATURATION: 98 % | TEMPERATURE: 98 F | WEIGHT: 169.98 LBS

## 2025-02-07 VITALS — SYSTOLIC BLOOD PRESSURE: 129 MMHG | TEMPERATURE: 98 F | HEART RATE: 70 BPM | DIASTOLIC BLOOD PRESSURE: 70 MMHG

## 2025-02-07 DIAGNOSIS — R11.2 NAUSEA WITH VOMITING, UNSPECIFIED: ICD-10-CM

## 2025-02-07 DIAGNOSIS — Z90.49 ACQUIRED ABSENCE OF OTHER SPECIFIED PARTS OF DIGESTIVE TRACT: Chronic | ICD-10-CM

## 2025-02-07 DIAGNOSIS — Z91.013 ALLERGY TO SEAFOOD: ICD-10-CM

## 2025-02-07 DIAGNOSIS — Z90.49 ACQUIRED ABSENCE OF OTHER SPECIFIED PARTS OF DIGESTIVE TRACT: ICD-10-CM

## 2025-02-07 DIAGNOSIS — J45.909 UNSPECIFIED ASTHMA, UNCOMPLICATED: ICD-10-CM

## 2025-02-07 LAB
ALBUMIN SERPL ELPH-MCNC: 4.6 G/DL — SIGNIFICANT CHANGE UP (ref 3.5–5.2)
ALP SERPL-CCNC: 90 U/L — SIGNIFICANT CHANGE UP (ref 30–115)
ALT FLD-CCNC: 22 U/L — SIGNIFICANT CHANGE UP (ref 0–41)
ANION GAP SERPL CALC-SCNC: 16 MMOL/L — HIGH (ref 7–14)
AST SERPL-CCNC: 21 U/L — SIGNIFICANT CHANGE UP (ref 0–41)
BASOPHILS # BLD AUTO: 0.05 K/UL — SIGNIFICANT CHANGE UP (ref 0–0.2)
BASOPHILS NFR BLD AUTO: 0.3 % — SIGNIFICANT CHANGE UP (ref 0–1)
BILIRUB SERPL-MCNC: 0.6 MG/DL — SIGNIFICANT CHANGE UP (ref 0.2–1.2)
BUN SERPL-MCNC: 15 MG/DL — SIGNIFICANT CHANGE UP (ref 10–20)
CALCIUM SERPL-MCNC: 9.2 MG/DL — SIGNIFICANT CHANGE UP (ref 8.4–10.5)
CHLORIDE SERPL-SCNC: 103 MMOL/L — SIGNIFICANT CHANGE UP (ref 98–110)
CO2 SERPL-SCNC: 22 MMOL/L — SIGNIFICANT CHANGE UP (ref 17–32)
CREAT SERPL-MCNC: 0.8 MG/DL — SIGNIFICANT CHANGE UP (ref 0.7–1.5)
EGFR: 124 ML/MIN/1.73M2 — SIGNIFICANT CHANGE UP
EOSINOPHIL # BLD AUTO: 0.22 K/UL — SIGNIFICANT CHANGE UP (ref 0–0.7)
EOSINOPHIL NFR BLD AUTO: 1.2 % — SIGNIFICANT CHANGE UP (ref 0–8)
FLUAV AG NPH QL: SIGNIFICANT CHANGE UP
FLUBV AG NPH QL: SIGNIFICANT CHANGE UP
GLUCOSE SERPL-MCNC: 123 MG/DL — HIGH (ref 70–99)
HCT VFR BLD CALC: 48.7 % — SIGNIFICANT CHANGE UP (ref 42–52)
HGB BLD-MCNC: 17.3 G/DL — SIGNIFICANT CHANGE UP (ref 14–18)
IMM GRANULOCYTES NFR BLD AUTO: 0.4 % — HIGH (ref 0.1–0.3)
LIDOCAIN IGE QN: 60 U/L — SIGNIFICANT CHANGE UP (ref 7–60)
LYMPHOCYTES # BLD AUTO: 1.02 K/UL — LOW (ref 1.2–3.4)
LYMPHOCYTES # BLD AUTO: 5.8 % — LOW (ref 20.5–51.1)
MCHC RBC-ENTMCNC: 30 PG — SIGNIFICANT CHANGE UP (ref 27–31)
MCHC RBC-ENTMCNC: 35.5 G/DL — SIGNIFICANT CHANGE UP (ref 32–37)
MCV RBC AUTO: 84.4 FL — SIGNIFICANT CHANGE UP (ref 80–94)
MONOCYTES # BLD AUTO: 1.22 K/UL — HIGH (ref 0.1–0.6)
MONOCYTES NFR BLD AUTO: 6.9 % — SIGNIFICANT CHANGE UP (ref 1.7–9.3)
NEUTROPHILS # BLD AUTO: 15.12 K/UL — HIGH (ref 1.4–6.5)
NEUTROPHILS NFR BLD AUTO: 85.4 % — HIGH (ref 42.2–75.2)
NRBC # BLD: 0 /100 WBCS — SIGNIFICANT CHANGE UP (ref 0–0)
NRBC BLD-RTO: 0 /100 WBCS — SIGNIFICANT CHANGE UP (ref 0–0)
PLATELET # BLD AUTO: 366 K/UL — SIGNIFICANT CHANGE UP (ref 130–400)
PMV BLD: 9.8 FL — SIGNIFICANT CHANGE UP (ref 7.4–10.4)
POTASSIUM SERPL-MCNC: 4.2 MMOL/L — SIGNIFICANT CHANGE UP (ref 3.5–5)
POTASSIUM SERPL-SCNC: 4.2 MMOL/L — SIGNIFICANT CHANGE UP (ref 3.5–5)
PROT SERPL-MCNC: 6.7 G/DL — SIGNIFICANT CHANGE UP (ref 6–8)
RBC # BLD: 5.77 M/UL — SIGNIFICANT CHANGE UP (ref 4.7–6.1)
RBC # FLD: 12.8 % — SIGNIFICANT CHANGE UP (ref 11.5–14.5)
RSV RNA NPH QL NAA+NON-PROBE: SIGNIFICANT CHANGE UP
SARS-COV-2 RNA SPEC QL NAA+PROBE: SIGNIFICANT CHANGE UP
SODIUM SERPL-SCNC: 141 MMOL/L — SIGNIFICANT CHANGE UP (ref 135–146)
WBC # BLD: 17.7 K/UL — HIGH (ref 4.8–10.8)
WBC # FLD AUTO: 17.7 K/UL — HIGH (ref 4.8–10.8)

## 2025-02-07 PROCEDURE — 36415 COLL VENOUS BLD VENIPUNCTURE: CPT

## 2025-02-07 PROCEDURE — 96375 TX/PRO/DX INJ NEW DRUG ADDON: CPT

## 2025-02-07 PROCEDURE — 99284 EMERGENCY DEPT VISIT MOD MDM: CPT | Mod: 25

## 2025-02-07 PROCEDURE — 85025 COMPLETE CBC W/AUTO DIFF WBC: CPT

## 2025-02-07 PROCEDURE — 86140 C-REACTIVE PROTEIN: CPT

## 2025-02-07 PROCEDURE — 80053 COMPREHEN METABOLIC PANEL: CPT

## 2025-02-07 PROCEDURE — 85652 RBC SED RATE AUTOMATED: CPT

## 2025-02-07 PROCEDURE — 83735 ASSAY OF MAGNESIUM: CPT

## 2025-02-07 PROCEDURE — 99285 EMERGENCY DEPT VISIT HI MDM: CPT

## 2025-02-07 PROCEDURE — 84100 ASSAY OF PHOSPHORUS: CPT

## 2025-02-07 PROCEDURE — 87040 BLOOD CULTURE FOR BACTERIA: CPT

## 2025-02-07 PROCEDURE — 74177 CT ABD & PELVIS W/CONTRAST: CPT | Mod: 26

## 2025-02-07 PROCEDURE — 83036 HEMOGLOBIN GLYCOSYLATED A1C: CPT

## 2025-02-07 PROCEDURE — 93010 ELECTROCARDIOGRAM REPORT: CPT

## 2025-02-07 PROCEDURE — 80076 HEPATIC FUNCTION PANEL: CPT

## 2025-02-07 PROCEDURE — 87077 CULTURE AEROBIC IDENTIFY: CPT

## 2025-02-07 PROCEDURE — 99223 1ST HOSP IP/OBS HIGH 75: CPT

## 2025-02-07 PROCEDURE — 93005 ELECTROCARDIOGRAM TRACING: CPT

## 2025-02-07 PROCEDURE — 0241U: CPT

## 2025-02-07 PROCEDURE — 96374 THER/PROPH/DIAG INJ IV PUSH: CPT

## 2025-02-07 PROCEDURE — 83690 ASSAY OF LIPASE: CPT

## 2025-02-07 PROCEDURE — 87186 SC STD MICRODIL/AGAR DIL: CPT

## 2025-02-07 PROCEDURE — 87150 DNA/RNA AMPLIFIED PROBE: CPT

## 2025-02-07 PROCEDURE — 74177 CT ABD & PELVIS W/CONTRAST: CPT | Mod: MC

## 2025-02-07 RX ORDER — BACTERIOSTATIC SODIUM CHLORIDE 0.9 %
1000 VIAL (ML) INJECTION ONCE
Refills: 0 | Status: COMPLETED | OUTPATIENT
Start: 2025-02-07 | End: 2025-02-07

## 2025-02-07 RX ORDER — ONDANSETRON 4 MG/1
1 TABLET, ORALLY DISINTEGRATING ORAL
Qty: 1 | Refills: 0
Start: 2025-02-07 | End: 2025-02-09

## 2025-02-07 RX ORDER — ONDANSETRON 4 MG/1
4 TABLET, ORALLY DISINTEGRATING ORAL ONCE
Refills: 0 | Status: DISCONTINUED | OUTPATIENT
Start: 2025-02-07 | End: 2025-02-07

## 2025-02-07 RX ORDER — METOCLOPRAMIDE 10 MG/1
10 TABLET ORAL ONCE
Refills: 0 | Status: COMPLETED | OUTPATIENT
Start: 2025-02-07 | End: 2025-02-07

## 2025-02-07 RX ORDER — ACETAMINOPHEN, DIPHENHYDRAMINE HCL, PHENYLEPHRINE HCL 325; 25; 5 MG/1; MG/1; MG/1
3 TABLET ORAL AT BEDTIME
Refills: 0 | Status: DISCONTINUED | OUTPATIENT
Start: 2025-02-07 | End: 2025-02-09

## 2025-02-07 RX ORDER — ONDANSETRON 4 MG/1
4 TABLET, ORALLY DISINTEGRATING ORAL ONCE
Refills: 0 | Status: COMPLETED | OUTPATIENT
Start: 2025-02-07 | End: 2025-02-07

## 2025-02-07 RX ORDER — KETOROLAC TROMETHAMINE 10 MG
15 TABLET ORAL ONCE
Refills: 0 | Status: DISCONTINUED | OUTPATIENT
Start: 2025-02-07 | End: 2025-02-07

## 2025-02-07 RX ORDER — ACETAMINOPHEN 160 MG/5ML
650 SUSPENSION ORAL EVERY 6 HOURS
Refills: 0 | Status: DISCONTINUED | OUTPATIENT
Start: 2025-02-07 | End: 2025-02-09

## 2025-02-07 RX ORDER — HALOPERIDOL 10 MG/1
2.5 TABLET ORAL ONCE
Refills: 0 | Status: DISCONTINUED | OUTPATIENT
Start: 2025-02-07 | End: 2025-02-07

## 2025-02-07 RX ORDER — PANTOPRAZOLE 20 MG/1
40 TABLET, DELAYED RELEASE ORAL ONCE
Refills: 0 | Status: COMPLETED | OUTPATIENT
Start: 2025-02-07 | End: 2025-02-07

## 2025-02-07 RX ORDER — SODIUM CHLORIDE 9 G/ML
1000 INJECTION, SOLUTION INTRAVENOUS
Refills: 0 | Status: DISCONTINUED | OUTPATIENT
Start: 2025-02-07 | End: 2025-02-09

## 2025-02-07 RX ORDER — OMEPRAZOLE 20 MG/1
1 CAPSULE, DELAYED RELEASE ORAL
Qty: 14 | Refills: 0
Start: 2025-02-07 | End: 2025-02-20

## 2025-02-07 RX ORDER — MAGNESIUM, ALUMINUM HYDROXIDE 200-225/5
30 SUSPENSION, ORAL (FINAL DOSE FORM) ORAL EVERY 4 HOURS
Refills: 0 | Status: DISCONTINUED | OUTPATIENT
Start: 2025-02-07 | End: 2025-02-09

## 2025-02-07 RX ORDER — ONDANSETRON 4 MG/1
4 TABLET, ORALLY DISINTEGRATING ORAL EVERY 8 HOURS
Refills: 0 | Status: DISCONTINUED | OUTPATIENT
Start: 2025-02-07 | End: 2025-02-09

## 2025-02-07 RX ADMIN — Medication 15 MILLIGRAM(S): at 11:15

## 2025-02-07 RX ADMIN — PANTOPRAZOLE 40 MILLIGRAM(S): 20 TABLET, DELAYED RELEASE ORAL at 13:42

## 2025-02-07 RX ADMIN — Medication 2 MILLIGRAM(S): at 22:27

## 2025-02-07 RX ADMIN — SODIUM CHLORIDE 75 MILLILITER(S): 9 INJECTION, SOLUTION INTRAVENOUS at 22:09

## 2025-02-07 RX ADMIN — METOCLOPRAMIDE 10 MILLIGRAM(S): 10 TABLET ORAL at 13:42

## 2025-02-07 RX ADMIN — ONDANSETRON 4 MILLIGRAM(S): 4 TABLET, ORALLY DISINTEGRATING ORAL at 11:15

## 2025-02-07 RX ADMIN — Medication 1000 MILLILITER(S): at 11:15

## 2025-02-07 RX ADMIN — ONDANSETRON 4 MILLIGRAM(S): 4 TABLET, ORALLY DISINTEGRATING ORAL at 18:11

## 2025-02-07 RX ADMIN — Medication 1000 MILLILITER(S): at 18:11

## 2025-02-07 NOTE — ED PROVIDER NOTE - NS ED MD DISPO ISOLATION TYPES
None Quality 226: Preventive Care And Screening: Tobacco Use: Screening And Cessation Intervention: Patient screened for tobacco use, is a smoker AND did not received Cessation Counseling for Unknown Reasons Quality 110: Preventive Care And Screening: Influenza Immunization: Influenza Immunization Administered during Influenza season Detail Level: Detailed

## 2025-02-07 NOTE — ED ADULT NURSE NOTE - NSFALLRISK_ED_ALL_ED
RECORD     [x] Admission Note          [x] Progress Note          [] Discharge Summary     Bhavesh Caldwell is a well-appearing male infant born on 2022 at 12:03 PM via , low transverse. His mother is a 24 y.o. .      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   Delivery was complicated by decels, arrest of dilatation.   Presentation was vertex.  APGAR scores were 6 and 8 at one and five minutes, respectively.   Birth Weight: 3.465 kg (7 lb 10.2 oz) which is appropriate for his gestational age.       History     Mother's Prenatal Labs  ABO / Rh Lab Results   Component Value Date/Time    ABORH O POSITIVE 2024 06:20 PM        HIV Negative   RPR / TP-PA Negative   Rubella Immune   HBsAg Negative   C. Trachomatis Negative   N. Gonorrhoeae Negative   Group B Strep Negative     Mother's Medical History  Past Medical History:   Diagnosis Date    Achondroplasia        Current Outpatient Medications   Medication Instructions    Prenatal MV-Min-Fe Fum-FA-DHA (PRENATAL 1 PO) 1 tablet, Oral, DAILY       Labor Events   Labor: No    Steroids: None   Antibiotics During Labor: No   Rupture Date/Time: 2024 10:17 AM   Rupture Type: AROM   Amniotic Fluid Description: Clear    Amniotic Fluid Odor: None    Labor complications: Fetal Intolerance    Additional complications:        Delivery Summary  Delivery Type: , Low Transverse    Delivery Resuscitation: Bulb Suction;Stimulation;CPAP    Number of Vessels:  3 Vessels   Cord Events: None   Meconium Stained: Clear [1]   Amniotic Fluid Description: Clear      Review the Delivery Report for details.     Delivery Note    Attended this unscheduled C/S for fetal decels and maternal achondroplasia. ROM 10/5 @ 1017. Infant emerged with spontaneous cry on field; brought to RW.  Dried, stimulated and bulb suctioned. Infant with intermittent cry, but  No

## 2025-02-07 NOTE — ED ADULT NURSE NOTE - PAIN RATING/NUMBER SCALE (0-10): ACTIVITY
no
0 (no pain/absence of nonverbal indicators of pain)
impairments found/anticipated discharge recommendation/functional limitations in following categories/risk reduction/prevention

## 2025-02-07 NOTE — ED PROVIDER NOTE - CCCP TRG CHIEF CMPLNT
pt presents to ed with c/o Nausea and Vomiting after eating a tuna fish sandwich last night. he dies diarrhea, adbominal pain and hemttochezia/vomiting

## 2025-02-07 NOTE — H&P ADULT - HISTORY OF PRESENT ILLNESS
29-year-old male with PMH of alcohol use disorder, chronic marijuana use, and asthma on albuterol inhalers presenting for vomiting and tremors. Also endorses complains of lightheadedness, lethargy, abdominal pain, headache.  Symptoms began morning of admission. Patient presented to the ED earlier today for similar symptoms, treated with Zofran and pantoprazole and Reglan as well as IVF, CT scan abdomen pelvis WNL. Patient states symptoms improved by DC however once he got home symptoms returned and worsened.  Multiple episodes of NBNB emesis today. States last time using marijuana was 2 days ago; adamantly denies excessive alcohol consumption or other drug use. Of note patient has had multiple ED visits in the past two months for similar symptoms including a hospitalization this past December.     mm Hg / 71 mm Hg  HR 62 /min  RR 22 /min  Temp 97.5 Degrees F  O2 Sat 97 % room air

## 2025-02-07 NOTE — ED PROVIDER NOTE - PHYSICAL EXAMINATION
GENERAL: Uncomfortable appearing, in no acute distress.   SKIN: warm, diaphoretic  HEAD: Normocephalic; atraumatic.  EYES: PERRLA, EOMI, no conjunctival erythema  ENT: No nasal discharge; airway clear. MMM  NECK: Supple; non tender.  CARD: Regular rate and rhythm. S1, S2 normal; no murmurs, gallops, or rubs.   RESP: LCTAB; No wheezes, rales, rhonchi, or stridor.  ABD: soft, mild diffuse tenderness, nondistended  EXT: Normal ROM.  No LE TTP or edema bilaterally.  NEURO: A/ox3, grossly unremarkable  PSYCH: Cooperative, appropriate.

## 2025-02-07 NOTE — ED PROVIDER NOTE - ATTENDING CONTRIBUTION TO CARE
- - -
I have personally seen and examined this patient. I have fully participated in the care of this patient. I have made amendments to the documentation where appropriate and otherwise agree with the history, physical exam, and plan as documented by the Resident.

## 2025-02-07 NOTE — ED PROVIDER NOTE - PHYSICAL EXAMINATION
VITAL SIGNS: I have reviewed nursing notes and confirm.  CONSTITUTIONAL: Well-developed; well-nourished; Diaphoretic and tremulous  SKIN: Skin exam is warm and dry, no acute rash.  HEAD: Normocephalic; atraumatic.  EYES: PERRL, EOM intact; conjunctiva and sclera clear.  ENT: No nasal discharge; airway clear.  NECK: Supple; non tender.  CARD: S1, S2 normal; no murmurs, gallops, or rubs. Regular rate and rhythm.  RESP: mild tachypnea or distress. Lungs CTAB, no wheezes, rales or rhonchi.  ABD: soft, NT/ND.  EXT: Normal ROM. No clubbing, cyanosis or edema.  LYMPH: No acute cervical adenopathy.  NEURO: Alert, oriented. Grossly unremarkable. No focal deficits.  PSYCH: Cooperative, appropriate.

## 2025-02-07 NOTE — ED PROVIDER NOTE - CLINICAL SUMMARY MEDICAL DECISION MAKING FREE TEXT BOX
29-year-old male history of frequent marijuana use, asthma returns to the ED complaining of persistent vomiting.  Symptoms started at 8:00 this morning.  Patient was seen earlier in the ED, had labs, fluids antiemetics CT abdomen/ pelvis, symptoms improved and  he was discharged home.  Upon returning home he  started vomiting again which prompted ED visit.  Review of prior records shows several similar presentations.  No additional acute complaints. Patient denies any recent/excessive alcohol use.  Appears uncomfortable but in no acute distress, no focal abdominal tenderness to palpation, speaking full sentences, lungs clear to auscultation, awake and alert.  Plan: Fluids, antiemetics and admit.

## 2025-02-07 NOTE — ED PROVIDER NOTE - OBJECTIVE STATEMENT
29-year-old male, with past medical history of asthma, alcohol use disorder, chronic marijuana use, appendectomy, presents to the ED with nausea and NBNB emesis since 8 AM.  Reports approximately 5 episodes.  States he believes it is from the tuna that he ate last night.  Denies abdominal pain, diarrhea, fever, chills, dysuria.

## 2025-02-07 NOTE — H&P ADULT - ATTENDING COMMENTS
28 y/o Domiciled male with a PMH of alcohol use disorder, chronic marijuana use, ADHD, and asthma on albuterol inhalers presented for  nausea  and  vomiting.     Agree  with assessment  except for changes below.   Vital Signs Last 24 Hrs  T(C): 36.7 (07 Feb 2025 21:48), Max: 36.9 (07 Feb 2025 10:21)  T(F): 98.1 (07 Feb 2025 21:48), Max: 98.4 (07 Feb 2025 10:21)  HR: 94 (07 Feb 2025 21:48) (62 - 98)  BP: 128/70 (07 Feb 2025 21:48) (128/70 - 136/82)  BP(mean): --  RR: 16 (07 Feb 2025 21:48) (16 - 22)  SpO2: 98% (07 Feb 2025 21:48) (97% - 98%)    Parameters below as of 07 Feb 2025 21:48  Patient On (Oxygen Delivery Method): room air    CT A/P:  No CT evidence for acute intra-abdominal or pelvic pathology.    Chest X-Ray :  Unremarkable     IMPRESSION   Suspected Cannibis Induced Hyperemesis  Syndrome  Flare- Now Resolved   Hx  Marijuana  Abuse  Daily use of 10-15 Joints 15 year   Hx  Cannibis Induced Hyperemesis  Syndrome  Hx  ETOH  Abuse   Elevated  WBC,  RVP Neg  Send  Urine  Drug  Screen   Zofran, Reglan  PRN  Capsacin  Cream   as indicated   IV  Haldol as indicated   IVF   CIWA monitoring   Correct electrolytes (Target Na = 135-145 | Mg = >2.2 | K = 3.5-5)   Send Infectious W/u     Chest Nodule  : OP f/u     Hx  ADHD  - OP f/u     DC Planing    Seen on 02/07

## 2025-02-07 NOTE — ED PROVIDER NOTE - PATIENT PORTAL LINK FT
You can access the FollowMyHealth Patient Portal offered by [facility] by registering at the following website: [email or web address]. By joining [employer]’s FollowMyHealth portal, you will also be able to view your health information using other applications (apps) compatible with our system. You can access the FollowMyHealth Patient Portal offered by [facility] by registering at the following website: [email or web address]. By joining [employer]’s FollowMyHealth portal, you will also be able to view your health information using other applications (apps) compatible with our system.

## 2025-02-07 NOTE — ED PROVIDER NOTE - PROGRESS NOTE DETAILS
Plan to admit patient for intractable vomiting, spoke with MAR regarding patient's clinical presentation.  MAR requesting further blood testing and urine drug screen and states inpatient team will follow.

## 2025-02-07 NOTE — ED PROVIDER NOTE - OBJECTIVE STATEMENT
29-year-old male with PMH of alcohol use disorder, chronic marijuana use and asthma on albuterol inhalers presenting for vomiting and tremors.  A/W lightheadedness, weakness, abdominal pain, headache.  Symptoms began yesterday.  Patient presented to the ED earlier today for similar symptoms, treated with Zofran and pantoprazole and Reglan as well as IVF, CT scan abdomen pelvis WNL.patient states symptoms improved by DC however once he got home symptoms returned and worsened.  Multiple episodes of NBNB emesis today.  Patient states he had to do yesterday but is unsure if is related to his symptoms.  States last time using marijuana was 2 days ago, adamantly denies alcohol use and drug use.  Of note patient has had multiple ED visits in the past 2 months for similar symptoms including 1 hospitalization in the end of December.  Denies chest pain, SOB,, UTI symptoms, URI symptoms, numbness, tingling.  Patient discharged with Zofran ODT prescription however never filled prescription. 29-year-old male with PMH of alcohol use disorder, chronic marijuana use and asthma on albuterol inhalers presenting for vomiting and tremors.  A/W lightheadedness, weakness, abdominal pain, headache.  Symptoms began this morning. Patient presented to the ED earlier today for similar symptoms, treated with Zofran and pantoprazole and Reglan as well as IVF, CT scan abdomen pelvis WNL. patient states symptoms improved by DC however once he got home symptoms returned and worsened.  Multiple episodes of NBNB emesis today.  Patient states he had to do yesterday but is unsure if is related to his symptoms.  States last time using marijuana was 2 days ago, adamantly denies alcohol use and drug use.  Of note patient has had multiple ED visits in the past 2 months for similar symptoms including 1 hospitalization in the end of December.  Denies chest pain, SOB,, UTI symptoms, URI symptoms, numbness, tingling.  Patient discharged with Zofran ODT prescription however never filled prescription.

## 2025-02-07 NOTE — H&P ADULT - ASSESSMENT
28 y/o Domiciled male with a PMH of alcohol use disorder, chronic marijuana use, ADHD, and asthma on albuterol inhalers presented for  nausea  and  vomiting.     Agree  with assessment  except for changes below.   Vital Signs Last 24 Hrs  T(C): 36.7 (07 Feb 2025 21:48), Max: 36.9 (07 Feb 2025 10:21)  T(F): 98.1 (07 Feb 2025 21:48), Max: 98.4 (07 Feb 2025 10:21)  HR: 94 (07 Feb 2025 21:48) (62 - 98)  BP: 128/70 (07 Feb 2025 21:48) (128/70 - 136/82)  BP(mean): --  RR: 16 (07 Feb 2025 21:48) (16 - 22)  SpO2: 98% (07 Feb 2025 21:48) (97% - 98%)    Parameters below as of 07 Feb 2025 21:48  Patient On (Oxygen Delivery Method): room air    CT A/P:  No CT evidence for acute intra-abdominal or pelvic pathology.    Chest X-Ray :  Unremarkable     IMPRESSION   Suspected Cannibis Induced Hyperemesis  Syndrome  Flare   Hx  Marijuana  Abuse  Daily use of 10-15 Joints 15 year   Hx  Cannibis Induced Hyperemesis  Syndrome  Hx  ETOH  Abuse   Elevated  WBC,  RVP Neg  Send  Urine  Drug  Screen   Zofran, Reglan  PRN  Capsacin  Cream   as indicated   IV  Haldol as indicated   IVF   CIWA monitoring   Correct electrolytes (Target Na = 135-145 | Mg = >2.2 | K = 3.5-5)   Send Infectious W/u     Chest Nodule  : OP f/u     Hx  ADHD  - OP f/u            29-year-old male with PMH of alcohol use disorder, chronic marijuana use, and asthma on albuterol inhalers presenting for vomiting and tremors. Also endorses complains of lightheadedness, lethargy, abdominal pain, headache.  Symptoms began morning of admission. Patient presented to the ED earlier today for similar symptoms, treated with Zofran and pantoprazole and Reglan as well as IVF, CT scan abdomen pelvis WNL. Patient states symptoms improved by DC however once he got home symptoms returned and worsened.  Multiple episodes of NBNB emesis today. States last time using marijuana was 2 days ago; adamantly denies excessive alcohol consumption or other drug use. Of note patient has had multiple ED visits in the past two months for similar symptoms including a hospitalization this past December.    #Alcohol Use Disorder  #Chronic Marijuana Use  #Abdominal Pain  - Vitals stable on admission  - CTAP w/ IV contrast - No CT evidence for acute intra-abdominal or pelvic pathology.  - CIWA protocol  - RVP negative  - C/w fluids + antiemetics  - F/u Urine and Serum Tox Screen  - F/u Alcohol Screen  - F/u CRP, ESR, UA, BCx  - Addiction Medicine Consult  - CATCH Team Consult    #Hx of Asthma  - C/w home inhalers as appropriate    #Chest Nodule   - OP f/u     #Hx  ADHD   - OP f/u     DVT PPx: Ambulatory  Diet: Reg  GI: PPI      Attending note below:    28 y/o Domiciled male with a PMH of alcohol use disorder, chronic marijuana use, ADHD, and asthma on albuterol inhalers presented for  nausea  and  vomiting.     Agree  with assessment  except for changes below.   Vital Signs Last 24 Hrs  T(C): 36.7 (07 Feb 2025 21:48), Max: 36.9 (07 Feb 2025 10:21)  T(F): 98.1 (07 Feb 2025 21:48), Max: 98.4 (07 Feb 2025 10:21)  HR: 94 (07 Feb 2025 21:48) (62 - 98)  BP: 128/70 (07 Feb 2025 21:48) (128/70 - 136/82)  BP(mean): --  RR: 16 (07 Feb 2025 21:48) (16 - 22)  SpO2: 98% (07 Feb 2025 21:48) (97% - 98%)    Parameters below as of 07 Feb 2025 21:48  Patient On (Oxygen Delivery Method): room air    CT A/P:  No CT evidence for acute intra-abdominal or pelvic pathology.    Chest X-Ray :  Unremarkable     IMPRESSION   Suspected Cannibis Induced Hyperemesis  Syndrome  Flare   Hx  Marijuana  Abuse  Daily use of 10-15 Joints 15 year   Hx  Cannibis Induced Hyperemesis  Syndrome  Hx  ETOH  Abuse   Elevated  WBC,  RVP Neg  Send  Urine  Drug  Screen   Zofran, Reglan  PRN  Capsacin  Cream   as indicated   IV  Haldol as indicated   IVF   CIWA monitoring   Correct electrolytes (Target Na = 135-145 | Mg = >2.2 | K = 3.5-5)   Send Infectious W/u     Chest Nodule  : OP f/u     Hx  ADHD  - OP f/u            29-year-old male with PMH of alcohol use disorder, chronic marijuana use, and asthma on albuterol inhalers presenting for vomiting and tremors. Also endorses complains of lightheadedness, lethargy, abdominal pain, headache.  Symptoms began morning of admission. Patient presented to the ED earlier today for similar symptoms, treated with Zofran and pantoprazole and Reglan as well as IVF, CT scan abdomen pelvis WNL. Patient states symptoms improved by DC however once he got home symptoms returned and worsened.  Multiple episodes of NBNB emesis today. States last time using marijuana was 2 days ago; adamantly denies excessive alcohol consumption or other drug use. Of note patient has had multiple ED visits in the past two months for similar symptoms including a hospitalization this past December.    #Alcohol Use Disorder  #Chronic Marijuana Use  #Abdominal Pain  - Vitals stable on admission  - CTAP w/ IV contrast - No CT evidence for acute intra-abdominal or pelvic pathology.  - CIWA protocol  - RVP negative  - C/w fluids + antiemetics  - F/u Urine and Serum Tox Screen  - F/u Alcohol Screen  - F/u CRP, ESR, UA, BCx  - Addiction Medicine Consult  - CATCH Team Consult    #Hx of Asthma  - C/w home inhalers PRN    #Chest Nodule   - OP f/u     #Hx  ADHD   - OP f/u     DVT PPx: Ambulatory  Diet: Reg  GI: PPI      Attending note below:    30 y/o Domiciled male with a PMH of alcohol use disorder, chronic marijuana use, ADHD, and asthma on albuterol inhalers presented for  nausea  and  vomiting.     Agree  with assessment  except for changes below.   Vital Signs Last 24 Hrs  T(C): 36.7 (07 Feb 2025 21:48), Max: 36.9 (07 Feb 2025 10:21)  T(F): 98.1 (07 Feb 2025 21:48), Max: 98.4 (07 Feb 2025 10:21)  HR: 94 (07 Feb 2025 21:48) (62 - 98)  BP: 128/70 (07 Feb 2025 21:48) (128/70 - 136/82)  BP(mean): --  RR: 16 (07 Feb 2025 21:48) (16 - 22)  SpO2: 98% (07 Feb 2025 21:48) (97% - 98%)    Parameters below as of 07 Feb 2025 21:48  Patient On (Oxygen Delivery Method): room air    CT A/P:  No CT evidence for acute intra-abdominal or pelvic pathology.    Chest X-Ray :  Unremarkable     IMPRESSION   Suspected Cannibis Induced Hyperemesis  Syndrome  Flare   Hx  Marijuana  Abuse  Daily use of 10-15 Joints 15 year   Hx  Cannibis Induced Hyperemesis  Syndrome  Hx  ETOH  Abuse   Elevated  WBC,  RVP Neg  Send  Urine  Drug  Screen   Zofran, Reglan  PRN  Capsacin  Cream   as indicated   IV  Haldol as indicated   IVF   CIWA monitoring   Correct electrolytes (Target Na = 135-145 | Mg = >2.2 | K = 3.5-5)   Send Infectious W/u     Chest Nodule  : OP f/u     Hx  ADHD  - OP f/u            Where Do You Want The Question To Include Opioid Counseling Located?: Case Summary Tab

## 2025-02-07 NOTE — H&P ADULT - NSHPPHYSICALEXAM_GEN_ALL_CORE
GENERAL: Nondistressed  HEAD: Atraumatic  EYES: Conjunctiva and sclera clear  LUNG: B/L good air entry  HEART: S1 S2 heard  ABDOMEN: Soft, Nontender, Nondistended; Bowel sounds present  EXTREMITIES: No edema GENERAL: Nondistressed  HEAD: Atraumatic  EYES: Conjunctiva and sclera clear  LUNG: B/L good air entry  HEART: S1 S2 heard  ABDOMEN: Soft, Nontender, Nondistended; Bowel sounds present  EXTREMITIES: No edema  NEURO: No tremor present on extension of arms  PSYCH: AOx3

## 2025-02-07 NOTE — ED ADULT TRIAGE NOTE - CHIEF COMPLAINT QUOTE
Pt was here this AM for abd pain, nausea & vomiting.. Went home, didn't fill script for Zofran. Came back to ED.

## 2025-02-07 NOTE — ED PROVIDER NOTE - CLINICAL SUMMARY MEDICAL DECISION MAKING FREE TEXT BOX
28 yo man w/ no pmhx here w/ n/v and abdo pain X 2 days  no fever  nb/nb emesis  hx of appendectomy in past    exam: 1223 (after antiemetics and toradol)  wd/wn  nad  mmm  rrr s1s2 wnl  cta b/l  nt/nd + BS no peritoneal signs  aao x 3  gait stable    28 yo man w/ n/v and abdo pain. Non-concerning exam. CT results pending but likely d/c home 28 yo man w/ no pmhx here w/ n/v and abdo pain X 2 days  no fever  nb/nb emesis  hx of appendectomy in past    exam: 1223 (after antiemetics and toradol)  wd/wn  nad  mmm  rrr s1s2 wnl  cta b/l  nt/nd + BS no peritoneal signs  aao x 3  gait stable    28 yo man w/ n/v and abdo pain. Non-concerning exam. CT results pending but likely d/c home    1300: scan w/o abnormality. pt marc PO. stable for d/c home

## 2025-02-07 NOTE — ED PROVIDER NOTE - PROGRESS NOTE DETAILS
Dr. Carol Kemp, DO: Patient feeling better and requesting discharge Dr. Carol Kemp, DO: Patient vomiting again, more meds ordered Dr. Carol Kemp, DO: Patient feeling better after fluids and meds. Will PO challenge

## 2025-02-07 NOTE — ED PROVIDER NOTE - NSFOLLOWUPINSTRUCTIONS_ED_ALL_ED_FT
return for increased pain, vomiting or any other concerning symtpoms  follow up with your doctor in 1-2 weeks

## 2025-02-08 LAB
A1C WITH ESTIMATED AVERAGE GLUCOSE RESULT: 5 % — SIGNIFICANT CHANGE UP (ref 4–5.6)
ALBUMIN SERPL ELPH-MCNC: 4.1 G/DL — SIGNIFICANT CHANGE UP (ref 3.5–5.2)
ALBUMIN SERPL ELPH-MCNC: 4.2 G/DL — SIGNIFICANT CHANGE UP (ref 3.5–5.2)
ALP SERPL-CCNC: 77 U/L — SIGNIFICANT CHANGE UP (ref 30–115)
ALP SERPL-CCNC: 78 U/L — SIGNIFICANT CHANGE UP (ref 30–115)
ALT FLD-CCNC: 17 U/L — SIGNIFICANT CHANGE UP (ref 0–41)
ALT FLD-CCNC: 17 U/L — SIGNIFICANT CHANGE UP (ref 0–41)
ANION GAP SERPL CALC-SCNC: 14 MMOL/L — SIGNIFICANT CHANGE UP (ref 7–14)
AST SERPL-CCNC: 20 U/L — SIGNIFICANT CHANGE UP (ref 0–41)
AST SERPL-CCNC: 20 U/L — SIGNIFICANT CHANGE UP (ref 0–41)
BASOPHILS # BLD AUTO: 0.04 K/UL — SIGNIFICANT CHANGE UP (ref 0–0.2)
BASOPHILS NFR BLD AUTO: 0.3 % — SIGNIFICANT CHANGE UP (ref 0–1)
BILIRUB DIRECT SERPL-MCNC: 0.3 MG/DL — SIGNIFICANT CHANGE UP (ref 0–0.3)
BILIRUB INDIRECT FLD-MCNC: 0.5 MG/DL — SIGNIFICANT CHANGE UP (ref 0.2–1.2)
BILIRUB SERPL-MCNC: 0.8 MG/DL — SIGNIFICANT CHANGE UP (ref 0.2–1.2)
BILIRUB SERPL-MCNC: 0.8 MG/DL — SIGNIFICANT CHANGE UP (ref 0.2–1.2)
BUN SERPL-MCNC: 9 MG/DL — LOW (ref 10–20)
CALCIUM SERPL-MCNC: 8.8 MG/DL — SIGNIFICANT CHANGE UP (ref 8.4–10.5)
CHLORIDE SERPL-SCNC: 105 MMOL/L — SIGNIFICANT CHANGE UP (ref 98–110)
CO2 SERPL-SCNC: 22 MMOL/L — SIGNIFICANT CHANGE UP (ref 17–32)
CREAT SERPL-MCNC: 0.7 MG/DL — SIGNIFICANT CHANGE UP (ref 0.7–1.5)
CRP SERPL-MCNC: 32.1 MG/L — HIGH
EGFR: 130 ML/MIN/1.73M2 — SIGNIFICANT CHANGE UP
EOSINOPHIL # BLD AUTO: 0.04 K/UL — SIGNIFICANT CHANGE UP (ref 0–0.7)
EOSINOPHIL NFR BLD AUTO: 0.3 % — SIGNIFICANT CHANGE UP (ref 0–8)
ERYTHROCYTE [SEDIMENTATION RATE] IN BLOOD: 4 MM/HR — SIGNIFICANT CHANGE UP (ref 0–10)
ESTIMATED AVERAGE GLUCOSE: 97 MG/DL — SIGNIFICANT CHANGE UP (ref 68–114)
GLUCOSE SERPL-MCNC: 72 MG/DL — SIGNIFICANT CHANGE UP (ref 70–99)
HCT VFR BLD CALC: 42.2 % — SIGNIFICANT CHANGE UP (ref 42–52)
HGB BLD-MCNC: 15.1 G/DL — SIGNIFICANT CHANGE UP (ref 14–18)
IMM GRANULOCYTES NFR BLD AUTO: 0.4 % — HIGH (ref 0.1–0.3)
LYMPHOCYTES # BLD AUTO: 16.6 % — LOW (ref 20.5–51.1)
LYMPHOCYTES # BLD AUTO: 2.45 K/UL — SIGNIFICANT CHANGE UP (ref 1.2–3.4)
MAGNESIUM SERPL-MCNC: 1.7 MG/DL — LOW (ref 1.8–2.4)
MCHC RBC-ENTMCNC: 30.9 PG — SIGNIFICANT CHANGE UP (ref 27–31)
MCHC RBC-ENTMCNC: 35.8 G/DL — SIGNIFICANT CHANGE UP (ref 32–37)
MCV RBC AUTO: 86.3 FL — SIGNIFICANT CHANGE UP (ref 80–94)
MONOCYTES # BLD AUTO: 1.51 K/UL — HIGH (ref 0.1–0.6)
MONOCYTES NFR BLD AUTO: 10.3 % — HIGH (ref 1.7–9.3)
NEUTROPHILS # BLD AUTO: 10.63 K/UL — HIGH (ref 1.4–6.5)
NEUTROPHILS NFR BLD AUTO: 72.1 % — SIGNIFICANT CHANGE UP (ref 42.2–75.2)
NRBC # BLD: 0 /100 WBCS — SIGNIFICANT CHANGE UP (ref 0–0)
NRBC BLD-RTO: 0 /100 WBCS — SIGNIFICANT CHANGE UP (ref 0–0)
PHOSPHATE SERPL-MCNC: 4.1 MG/DL — SIGNIFICANT CHANGE UP (ref 2.1–4.9)
PLATELET # BLD AUTO: 311 K/UL — SIGNIFICANT CHANGE UP (ref 130–400)
PMV BLD: 10.2 FL — SIGNIFICANT CHANGE UP (ref 7.4–10.4)
POTASSIUM SERPL-MCNC: 3.4 MMOL/L — LOW (ref 3.5–5)
POTASSIUM SERPL-SCNC: 3.4 MMOL/L — LOW (ref 3.5–5)
PROT SERPL-MCNC: 6.2 G/DL — SIGNIFICANT CHANGE UP (ref 6–8)
PROT SERPL-MCNC: 6.3 G/DL — SIGNIFICANT CHANGE UP (ref 6–8)
RBC # BLD: 4.89 M/UL — SIGNIFICANT CHANGE UP (ref 4.7–6.1)
RBC # FLD: 13.2 % — SIGNIFICANT CHANGE UP (ref 11.5–14.5)
SODIUM SERPL-SCNC: 141 MMOL/L — SIGNIFICANT CHANGE UP (ref 135–146)
WBC # BLD: 14.73 K/UL — HIGH (ref 4.8–10.8)
WBC # FLD AUTO: 14.73 K/UL — HIGH (ref 4.8–10.8)

## 2025-02-08 PROCEDURE — 99232 SBSQ HOSP IP/OBS MODERATE 35: CPT

## 2025-02-08 PROCEDURE — 93010 ELECTROCARDIOGRAM REPORT: CPT

## 2025-02-08 RX ORDER — ENOXAPARIN SODIUM 100 MG/ML
40 INJECTION SUBCUTANEOUS EVERY 24 HOURS
Refills: 0 | Status: DISCONTINUED | OUTPATIENT
Start: 2025-02-08 | End: 2025-02-09

## 2025-02-08 RX ORDER — POTASSIUM CHLORIDE 750 MG/1
40 TABLET, EXTENDED RELEASE ORAL ONCE
Refills: 0 | Status: COMPLETED | OUTPATIENT
Start: 2025-02-08 | End: 2025-02-08

## 2025-02-08 RX ORDER — DIPHENHYDRAMINE HCL 25 MG
25 CAPSULE ORAL ONCE
Refills: 0 | Status: COMPLETED | OUTPATIENT
Start: 2025-02-08 | End: 2025-02-08

## 2025-02-08 RX ORDER — FOLIC ACID 1 MG
1 TABLET ORAL DAILY
Refills: 0 | Status: DISCONTINUED | OUTPATIENT
Start: 2025-02-08 | End: 2025-02-09

## 2025-02-08 RX ORDER — POTASSIUM CHLORIDE 750 MG/1
20 TABLET, EXTENDED RELEASE ORAL ONCE
Refills: 0 | Status: DISCONTINUED | OUTPATIENT
Start: 2025-02-08 | End: 2025-02-08

## 2025-02-08 RX ORDER — THIAMINE HCL 100 MG
100 TABLET ORAL DAILY
Refills: 0 | Status: DISCONTINUED | OUTPATIENT
Start: 2025-02-08 | End: 2025-02-09

## 2025-02-08 RX ORDER — PANTOPRAZOLE 20 MG/1
40 TABLET, DELAYED RELEASE ORAL
Refills: 0 | Status: DISCONTINUED | OUTPATIENT
Start: 2025-02-08 | End: 2025-02-09

## 2025-02-08 RX ORDER — ALBUTEROL 90 MCG
2 AEROSOL REFILL (GRAM) INHALATION EVERY 6 HOURS
Refills: 0 | Status: DISCONTINUED | OUTPATIENT
Start: 2025-02-08 | End: 2025-02-09

## 2025-02-08 RX ADMIN — Medication 1 MILLIGRAM(S): at 12:29

## 2025-02-08 RX ADMIN — METOCLOPRAMIDE 10 MILLIGRAM(S): 10 TABLET ORAL at 05:50

## 2025-02-08 RX ADMIN — ACETAMINOPHEN, DIPHENHYDRAMINE HCL, PHENYLEPHRINE HCL 3 MILLIGRAM(S): 325; 25; 5 TABLET ORAL at 23:36

## 2025-02-08 RX ADMIN — ONDANSETRON 4 MILLIGRAM(S): 4 TABLET, ORALLY DISINTEGRATING ORAL at 09:36

## 2025-02-08 RX ADMIN — Medication 100 MILLIGRAM(S): at 12:29

## 2025-02-08 RX ADMIN — POTASSIUM CHLORIDE 40 MILLIEQUIVALENT(S): 750 TABLET, EXTENDED RELEASE ORAL at 13:57

## 2025-02-08 RX ADMIN — PANTOPRAZOLE 40 MILLIGRAM(S): 20 TABLET, DELAYED RELEASE ORAL at 08:14

## 2025-02-08 RX ADMIN — SODIUM CHLORIDE 75 MILLILITER(S): 9 INJECTION, SOLUTION INTRAVENOUS at 13:57

## 2025-02-08 RX ADMIN — Medication 25 MILLIGRAM(S): at 18:14

## 2025-02-08 NOTE — PATIENT PROFILE ADULT - FALL HARM RISK - HARM RISK INTERVENTIONS
Assistance with ambulation/Assistance OOB with selected safe patient handling equipment/Communicate Risk of Fall with Harm to all staff/Monitor for mental status changes/Monitor gait and stability/Reinforce activity limits and safety measures with patient and family/Tailored Fall Risk Interventions/Toileting schedule using arm’s reach rule for commode and bathroom/Use of alarms - bed, chair and/or voice tab/Visual Cue: Yellow wristband and red socks/Bed in lowest position, wheels locked, appropriate side rails in place/Call bell, personal items and telephone in reach/Instruct patient to call for assistance before getting out of bed or chair/Non-slip footwear when patient is out of bed/[place] to call system/Physically safe environment - no spills, clutter or unnecessary equipment/Purposeful Proactive Rounding/Room/bathroom lighting operational, light cord in reach

## 2025-02-08 NOTE — PROGRESS NOTE ADULT - SUBJECTIVE AND OBJECTIVE BOX
Patient is a 29y old  Male who presents with a chief complaint of Abdominal Pain + Vomiting (07 Feb 2025 22:28)      OVERNIGHT EVENTS: had 3 x vomiting this AM, no abd pain     SUBJECTIVE / INTERVAL HPI: Patient seen and examined at bedside.     VITAL SIGNS:  Vital Signs Last 24 Hrs  T(C): 36.7 (08 Feb 2025 05:00), Max: 36.7 (07 Feb 2025 11:27)  T(F): 98 (08 Feb 2025 05:00), Max: 98.1 (07 Feb 2025 21:48)  HR: 98 (08 Feb 2025 05:00) (62 - 98)  BP: 112/56 (08 Feb 2025 05:00) (112/56 - 131/71)  BP(mean): --  RR: 18 (08 Feb 2025 05:00) (16 - 22)  SpO2: 94% (08 Feb 2025 05:00) (94% - 98%)    Parameters below as of 07 Feb 2025 21:48  Patient On (Oxygen Delivery Method): room air        PHYSICAL EXAM:    General: WDWN  HEENT: NC/AT; PERRL, clear conjunctiva  Neck: supple  Cardiovascular: +S1/S2; RRR  Respiratory: CTA b/l; no W/R/R  Gastrointestinal: soft, NT/ND; +BSx4  Extremities: WWP; 2+ peripheral pulses; no edema   Neurological: AAOx3; no focal deficits    MEDICATIONS:  MEDICATIONS  (STANDING):  folic acid 1 milliGRAM(s) Oral daily  influenza   Vaccine 0.5 milliLiter(s) IntraMuscular once  lactated ringers. 1000 milliLiter(s) (75 mL/Hr) IV Continuous <Continuous>  pantoprazole    Tablet 40 milliGRAM(s) Oral before breakfast  thiamine 100 milliGRAM(s) Oral daily    MEDICATIONS  (PRN):  acetaminophen     Tablet .. 650 milliGRAM(s) Oral every 6 hours PRN Temp greater or equal to 38C (100.4F), Mild Pain (1 - 3)  albuterol    90 MICROgram(s) HFA Inhaler 2 Puff(s) Inhalation every 6 hours PRN for bronchospasm  aluminum hydroxide/magnesium hydroxide/simethicone Suspension 30 milliLiter(s) Oral every 4 hours PRN Dyspepsia  LORazepam   Injectable 2 milliGRAM(s) IV Push every 1 hour PRN Symptom-triggered: each CIWA -Ar score 8 or GREATER  melatonin 3 milliGRAM(s) Oral at bedtime PRN Insomnia  ondansetron Injectable 4 milliGRAM(s) IV Push every 8 hours PRN Nausea and/or Vomiting      ALLERGIES:  Allergies    No Known Drug Allergies  shellfish (Rash; Urticaria; Hives)    Intolerances        LABS:                        15.1   14.73 )-----------( 311      ( 08 Feb 2025 07:09 )             42.2     02-08    141  |  105  |  9[L]  ----------------------------<  72  3.4[L]   |  22  |  0.7    Ca    8.8      08 Feb 2025 07:09  Phos  4.1     02-08  Mg     1.7     02-08    TPro  6.2  /  Alb  4.2  /  TBili  0.8  /  DBili  0.3  /  AST  20  /  ALT  17  /  AlkPhos  77  02-08      Urinalysis Basic - ( 08 Feb 2025 07:09 )    Color: x / Appearance: x / SG: x / pH: x  Gluc: 72 mg/dL / Ketone: x  / Bili: x / Urobili: x   Blood: x / Protein: x / Nitrite: x   Leuk Esterase: x / RBC: x / WBC x   Sq Epi: x / Non Sq Epi: x / Bacteria: x      CAPILLARY BLOOD GLUCOSE          RADIOLOGY & ADDITIONAL TESTS: Reviewed.    ASSESSMENT:    PLAN:

## 2025-02-08 NOTE — PROGRESS NOTE ADULT - ASSESSMENT
30 y/o male with a PMH of alcohol use disorder, chronic marijuana use, ADHD, and asthma on albuterol inhalers presented for  nausea  and  vomiting.     N/V, Suspected Cannibis Induced Hyperemesis  Syndrome  Flare  Hx  Marijuana  Abuse  Daily use of 10-15 Joints 15 year   Hx  Cannibis Induced Hyperemesis  Syndrome  Hx  ETOH  Abuse   Leukocytosis, reactive, monitor off abs    PLANs:    - anti emetic  - IVF  - ciw monitor   -  to stop Cannibis   - replace K   - DC in 24 hr if symptoms resolved

## 2025-02-09 VITALS — TEMPERATURE: 98 F

## 2025-02-09 LAB
-  STAPHYLOCOCCUS LUGDUNENSIS, METHICILLIN RESISTANT: SIGNIFICANT CHANGE UP
ALBUMIN SERPL ELPH-MCNC: 4.1 G/DL — SIGNIFICANT CHANGE UP (ref 3.5–5.2)
ALP SERPL-CCNC: 76 U/L — SIGNIFICANT CHANGE UP (ref 30–115)
ALT FLD-CCNC: 17 U/L — SIGNIFICANT CHANGE UP (ref 0–41)
ANION GAP SERPL CALC-SCNC: 16 MMOL/L — HIGH (ref 7–14)
AST SERPL-CCNC: 20 U/L — SIGNIFICANT CHANGE UP (ref 0–41)
BILIRUB SERPL-MCNC: 0.8 MG/DL — SIGNIFICANT CHANGE UP (ref 0.2–1.2)
BUN SERPL-MCNC: 8 MG/DL — LOW (ref 10–20)
CALCIUM SERPL-MCNC: 8.5 MG/DL — SIGNIFICANT CHANGE UP (ref 8.4–10.5)
CHLORIDE SERPL-SCNC: 101 MMOL/L — SIGNIFICANT CHANGE UP (ref 98–110)
CO2 SERPL-SCNC: 20 MMOL/L — SIGNIFICANT CHANGE UP (ref 17–32)
CREAT SERPL-MCNC: 0.8 MG/DL — SIGNIFICANT CHANGE UP (ref 0.7–1.5)
EGFR: 124 ML/MIN/1.73M2 — SIGNIFICANT CHANGE UP
GLUCOSE SERPL-MCNC: 82 MG/DL — SIGNIFICANT CHANGE UP (ref 70–99)
GRAM STN FLD: ABNORMAL
MAGNESIUM SERPL-MCNC: 1.7 MG/DL — LOW (ref 1.8–2.4)
METHOD TYPE: SIGNIFICANT CHANGE UP
PHOSPHATE SERPL-MCNC: 2.9 MG/DL — SIGNIFICANT CHANGE UP (ref 2.1–4.9)
POTASSIUM SERPL-MCNC: 3.7 MMOL/L — SIGNIFICANT CHANGE UP (ref 3.5–5)
POTASSIUM SERPL-SCNC: 3.7 MMOL/L — SIGNIFICANT CHANGE UP (ref 3.5–5)
PROT SERPL-MCNC: 6 G/DL — SIGNIFICANT CHANGE UP (ref 6–8)
SODIUM SERPL-SCNC: 137 MMOL/L — SIGNIFICANT CHANGE UP (ref 135–146)
SPECIMEN SOURCE: SIGNIFICANT CHANGE UP

## 2025-02-09 PROCEDURE — 99239 HOSP IP/OBS DSCHRG MGMT >30: CPT

## 2025-02-09 RX ADMIN — Medication 1 MILLIGRAM(S): at 11:21

## 2025-02-09 RX ADMIN — Medication 100 MILLIGRAM(S): at 11:21

## 2025-02-09 RX ADMIN — PANTOPRAZOLE 40 MILLIGRAM(S): 20 TABLET, DELAYED RELEASE ORAL at 07:19

## 2025-02-09 RX ADMIN — SODIUM CHLORIDE 75 MILLILITER(S): 9 INJECTION, SOLUTION INTRAVENOUS at 07:20

## 2025-02-09 NOTE — DISCHARGE NOTE NURSING/CASE MANAGEMENT/SOCIAL WORK - PATIENT PORTAL LINK FT
You can access the FollowMyHealth Patient Portal offered by [facility] by registering at the following website: [email or web address]. By joining [employer]’s FollowMyHealth portal, you will also be able to view your health information using other applications (apps) compatible with our system.

## 2025-02-09 NOTE — DISCHARGE NOTE NURSING/CASE MANAGEMENT/SOCIAL WORK - NSFLUVACAGEDISCH_IMM_ALL_CORE
"BMI for Adults    Body mass index (BMI) is a number that is calculated from a person's weight and height. BMI may help to estimate how much of a person's weight is composed of fat. BMI can help identify those who may be at higher risk for certain medical problems.  How is BMI used with adults?  BMI is used as a screening tool to identify possible weight problems. It is used to check whether a person is obese, overweight, healthy weight, or underweight.  How is BMI calculated?  BMI measures your weight and compares it to your height. This can be done either in English (U.S[place] or metric measurements. Note that charts are available to help you find your BMI quickly and easily without having to do these calculations yourself.  To calculate your BMI in English (U.S.) measurements, your health care provider will:  1. Measure your weight in pounds (lb).  2. Multiply the number of pounds by 703.  ? For example, for a person who weighs 180 lb, multiply that number by 703, which equals 126,540.  3. Measure your height in inches (in). Then multiply that number by itself to get a measurement called \"inches squared.\"  ? For example, for a person who is 70 in tall, the \"inches squared\" measurement is 70 in x 70 in, which equals 4900 inches squared.  4. Divide the total from Step 2 (number of lb x 703) by the total from Step 3 (inches squared): 126,540 ÷ 4900 = 25.8. This is your BMI.  To calculate your BMI in metric measurements, your health care provider will:  1. Measure your weight in kilograms (kg).  2. Measure your height in meters (m). Then multiply that number by itself to get a measurement called \"meters squared.\"  ? For example, for a person who is 1.75 m tall, the \"meters squared\" measurement is 1.75 m x 1.75 m, which is equal to 3.1 meters squared.  3. Divide the number of kilograms (your weight) by the meters squared number. In this example: 70 ÷ 3.1 = 22.6. This is your BMI.  How is BMI interpreted?  To interpret your " results, your health care provider will use BMI charts to identify whether you are underweight, normal weight, overweight, or obese. The following guidelines will be used:  · Underweight: BMI less than 18.5.  · Normal weight: BMI between 18.5 and 24.9.  · Overweight: BMI between 25 and 29.9.  · Obese: BMI of 30 and above.  Please note:  · Weight includes both fat and muscle, so someone with a muscular build, such as an athlete, may have a BMI that is higher than 24.9. In cases like these, BMI is not an accurate measure of body fat.  · To determine if excess body fat is the cause of a BMI of 25 or higher, further assessments may need to be done by a health care provider.  · BMI is usually interpreted in the same way for men and women.  Why is BMI a useful tool?  BMI is useful in two ways:  · Identifying a weight problem that may be related to a medical condition, or that may increase the risk for medical problems.  · Promoting lifestyle and diet changes in order to reach a healthy weight.  Summary  · Body mass index (BMI) is a number that is calculated from a person's weight and height.  · BMI may help to estimate how much of a person's weight is composed of fat. BMI can help identify those who may be at higher risk for certain medical problems.  · BMI can be measured using English measurements or metric measurements.  · To interpret your results, your health care provider will use BMI charts to identify whether you are underweight, normal weight, overweight, or obese.  This information is not intended to replace advice given to you by your health care provider. Make sure you discuss any questions you have with your health care provider.  Document Released: 08/29/2005 Document Revised: 11/30/2018 Document Reviewed: 10/31/2018  Elsevier Patient Education © 2020 [employer] Inc[employer]      Advance Care Planning and Advance Directives     You make decisions on a daily basis - decisions about where you want to live, your career,  your home, your life. Perhaps one of the most important decisions you face is your choice for future medical care. Take time to talk with your family and your healthcare team and start planning today.  Advance Care Planning is a process that can help you:  · Understand possible future healthcare decisions in light of your own experiences  · Reflect on those decision in light of your goals and values  · Discuss your decisions with those closest to you and the healthcare professionals that care for you  · Make a plan by creating a document that reflects your wishes    Surrogate Decision Maker  In the event of a medical emergency, which has left you unable to communicate or to make your own decisions, you would need someone to make decisions for you.  It is important to discuss your preferences for medical treatment with this person while you are in good health.     Qualities of a surrogate decision maker:  • Willing to take on this role and responsibility  • Knows what you want for future medical care  • Willing to follow your wishes even if they don't agree with them  • Able to make difficult medical decisions under stressful circumstances    Advance Directives  These are legal documents you can create that will guide your healthcare team and decision maker(s) when needed. These documents can be stored in the electronic medical record.    · Living Will - a legal document to guide your care if you have a terminal condition or a serious illness and are unable to communicate. States vary by statute in document names/types, but most forms may include one or more of the following:        -  Directions regarding life-prolonging treatments        -  Directions regarding artificially provided nutrition/hydration        -  Choosing a healthcare decision maker        -  Direction regarding organ/tissue donation    · Durable Power of  for Healthcare - this document names an -in-fact to make medical decisions for  you, but it may also allow this person to make personal and financial decisions for you. Please seek the advice of an  if you need this type of document.    **Advance Directives are not required and no one may discriminate against you if you do not sign one.    Medical Orders  Many states allow specific forms/orders signed by your physician to record your wishes for medical treatment in your current state of health. This form, signed in personal communication with your physician, addresses resuscitation and other medical interventions that you may or may not want.      For more information or to schedule a time with a [facility] Advance Care Planning Facilitator contact: [facility].[employer]/ACP or call [phone] and someone will contact you directly.     Adult

## 2025-02-09 NOTE — DISCHARGE NOTE PROVIDER - CARE PROVIDER_API CALL
Alfonso Parada  Internal Medicine  [address] [place] [address], Admin - Room [address] [place] [address]  Phone: [phone]  Fax: [phone]  Follow Up Time: 2 weeks

## 2025-02-09 NOTE — DISCHARGE NOTE PROVIDER - CARE PROVIDERS DIRECT ADDRESSES
[email or web address]

## 2025-02-09 NOTE — DISCHARGE NOTE PROVIDER - NSDCCPCAREPLAN_GEN_ALL_CORE_FT
PRINCIPAL DISCHARGE DIAGNOSIS  Diagnosis: Nausea & vomiting  Assessment and Plan of Treatment: this due to cannabinoid  use, you were counseled about stopping it

## 2025-02-09 NOTE — DISCHARGE NOTE PROVIDER - NSDCDCMDCOMP_GEN_ALL_CORE
Refill request for xanax 0.25 MG medication.      Name of Pharmacy- [facility]    Last visit - 8/26/2019       Pending visit -   Future Appointments   Date Time Provider Luis Alberto Marks   4/27/2020 11:15 AM RONNELL Salvador - [facility]         Last refill -5/1/19    Medication Contract signed -12/19/16   Last Oarrs ran- 5/1/19    Additional Comments This document is complete and the patient is ready for discharge.

## 2025-02-09 NOTE — DISCHARGE NOTE NURSING/CASE MANAGEMENT/SOCIAL WORK - FINANCIAL ASSISTANCE
[facility] provides services at a reduced cost to those who are determined to be eligible through [facility]’s financial assistance program. Information regarding [facility]’s financial assistance program can be found by going to [email or web address] or by calling [phone].

## 2025-02-09 NOTE — DISCHARGE NOTE PROVIDER - HOSPITAL COURSE
28 y/o male with a PMH of alcohol use disorder, chronic marijuana use, ADHD, and asthma on albuterol inhalers presented for  nausea  and  vomiting.     N/V, Suspected Cannibis Induced Hyperemesis  Syndrome  Flare  Hx  Marijuana  Abuse  Daily use of 10-15 Joints 15 year   Hx  Cannibis Induced Hyperemesis  Syndrome  Hx  ETOH  Abuse   Leukocytosis, reactive, monitor off abs    PLANs:    - symptoms resolved   -  to stop Cannibis

## 2025-02-10 ENCOUNTER — EMERGENCY (EMERGENCY)
Facility: HOSPITAL | Age: 30
LOS: 0 days | Discharge: HOME | End: 2025-02-10
Attending: EMERGENCY MEDICINE
Payer: MEDICAID

## 2025-02-10 VITALS
SYSTOLIC BLOOD PRESSURE: 128 MMHG | HEART RATE: 94 BPM | OXYGEN SATURATION: 99 % | RESPIRATION RATE: 18 BRPM | DIASTOLIC BLOOD PRESSURE: 85 MMHG | TEMPERATURE: 98 F | WEIGHT: 177.03 LBS | HEIGHT: 72 IN

## 2025-02-10 DIAGNOSIS — Z90.49 ACQUIRED ABSENCE OF OTHER SPECIFIED PARTS OF DIGESTIVE TRACT: Chronic | ICD-10-CM

## 2025-02-10 DIAGNOSIS — F90.9 ATTENTION-DEFICIT HYPERACTIVITY DISORDER, UNSPECIFIED TYPE: ICD-10-CM

## 2025-02-10 DIAGNOSIS — R79.89 OTHER SPECIFIED ABNORMAL FINDINGS OF BLOOD CHEMISTRY: ICD-10-CM

## 2025-02-10 DIAGNOSIS — F17.200 NICOTINE DEPENDENCE, UNSPECIFIED, UNCOMPLICATED: ICD-10-CM

## 2025-02-10 DIAGNOSIS — J45.909 UNSPECIFIED ASTHMA, UNCOMPLICATED: ICD-10-CM

## 2025-02-10 PROCEDURE — 99283 EMERGENCY DEPT VISIT LOW MDM: CPT

## 2025-02-10 PROCEDURE — 87040 BLOOD CULTURE FOR BACTERIA: CPT

## 2025-02-10 NOTE — ED PROVIDER NOTE - PATIENT PORTAL LINK FT
You can access the FollowMyHealth Patient Portal offered by [facility] by registering at the following website: [email or web address]. By joining [employer]’s FollowMyHealth portal, you will also be able to view your health information using other applications (apps) compatible with our system.

## 2025-02-10 NOTE — ED PROVIDER NOTE - CLINICAL SUMMARY MEDICAL DECISION MAKING FREE TEXT BOX
Patient presented with abnormal blood cultures as documented, possible contaminant. Patient otherwise afebrile, HD stable, completely asymptomatic. Repeat blood cultures sent. Given the above, will discharge home with outpatient follow up and strict return precautions of cultures are confirmed abnormal. Patient agreeable with plan. Agrees to return to ED for any new or worsening symptoms.

## 2025-02-10 NOTE — ED PROVIDER NOTE - PHYSICAL EXAMINATION
VITAL SIGNS: I have reviewed nursing notes and confirm.  CONSTITUTIONAL: Well-appearing, non-toxic, in NAD  SKIN: Warm dry, normal skin turgor  HEAD: NCAT  EYES: No conjunctival injection, scleral anicteric  ENT: Moist mucous membranes, normal pharynx with no erythema or exudates  NECK: Supple; full ROM. Nontender. No cervical LAD  CARD: RRR, no murmurs, rubs or gallops  RESP: Clear to ausculation bilaterally.  No rales, rhonchi, or wheezing.  ABD: Soft, non-distended, non-tender, no rebound or guarding. No CVA tenderness

## 2025-02-10 NOTE — CHART NOTE - NSCHARTNOTEFT_GEN_A_CORE
Bcx noted, 2nd set still not reported  contact numbers on the record called but no one answered will attempt in AM
one blood culture showed staph lugdunensis  and the other bcx is negative, discussed with ID;   recommended to repeat Bcx by primary care doctor or ER,   if its negative then its contaminant and if its +ve that means its true bacteremia and needs treatment and further espino.  pt was called today and advised to get Bcx by his PCP or at the ER, he was made aware that although this could be contaminate, its very important to r/u real bacteremia and understands the risk that if this is true then its  a serious medical; problem that need urgent eval and treatment.

## 2025-02-10 NOTE — ED PROVIDER NOTE - OBJECTIVE STATEMENT
Patient is a 29 year old male with a PMH of alcohol use disorder, chronic marijuana use, ADHD, and asthma presenting for positive blood cultures. Patient states he was recently admitted for intractable vomiting; had blood cultures drawn at the time of admission and today was told to come back to the ER for positive blood cultures. Patient states he is afebrile and asymptomatic at this time.

## 2025-02-10 NOTE — ED PROVIDER NOTE - NSFOLLOWUPINSTRUCTIONS_ED_ALL_ED_FT
Blood Culture Test  Why am I having this test?  A blood culture test is done to see if you have an infection in your blood called septicemia. This type of infection is also called blood poisoning.    You may need this test if:  You have a fever, chills, nausea, or tiredness.  Your health care provider thinks you may have blood poisoning.  What is being tested?  Your sample will be tested for germs called bacteria or fungi.    What kind of sample is taken?  A person having a blood sample taken from the arm.  At least two blood samples are needed for this test. They're taken by putting a needle into a blood vessel. The samples should come from two different veins. Two samples are taken because:  There's a better chance of finding the infection.  There's a better chance of the results being right. Even if you clean well, there may be germs on your skin. These germs can cause a positive result. If this happens, you'll need to have the test again.  How do I prepare for this test?  Tell your provider if you're taking antibiotics.  If possible, blood samples should be taken before you start the medicine.  If blood cultures are done while you're on an antibiotic, the samples should be taken right before you take a dose of the medicine.  How are the results reported?  Your results will be reported as positive or negative. For this test, a normal finding is a negative result.    What do the results mean?  A negative result means you don't have blood poisoning.  A positive result means you may have blood poisoning. It may also mean you have a serious infection.  Talk with your provider about what your results mean. In some cases, your provider may do more testing to confirm the results.    Questions to ask your health care provider  Ask your provider or the department doing the test:  When will my results be ready?  How will I get my results?  What are my treatment options?  What other tests do I need?  What are my next steps?  This information is not intended to replace advice given to you by your health care provider. Make sure you discuss any questions you have with your health care provider.

## 2025-02-11 PROBLEM — F12.90 CANNABIS USE, UNSPECIFIED, UNCOMPLICATED: Chronic | Status: ACTIVE | Noted: 2025-02-08

## 2025-02-11 PROBLEM — F10.10 ALCOHOL ABUSE, UNCOMPLICATED: Chronic | Status: ACTIVE | Noted: 2025-02-08

## 2025-02-12 LAB
-  CLINDAMYCIN: SIGNIFICANT CHANGE UP
-  ERYTHROMYCIN: SIGNIFICANT CHANGE UP
-  GENTAMICIN: SIGNIFICANT CHANGE UP
-  OXACILLIN: SIGNIFICANT CHANGE UP
-  RIFAMPIN: SIGNIFICANT CHANGE UP
-  TETRACYCLINE: SIGNIFICANT CHANGE UP
-  TRIMETHOPRIM/SULFAMETHOXAZOLE: SIGNIFICANT CHANGE UP
-  VANCOMYCIN: SIGNIFICANT CHANGE UP
CULTURE RESULTS: ABNORMAL
METHOD TYPE: SIGNIFICANT CHANGE UP
ORGANISM # SPEC MICROSCOPIC CNT: ABNORMAL
ORGANISM # SPEC MICROSCOPIC CNT: ABNORMAL
ORGANISM # SPEC MICROSCOPIC CNT: SIGNIFICANT CHANGE UP
SPECIMEN SOURCE: SIGNIFICANT CHANGE UP

## 2025-02-13 LAB
CULTURE RESULTS: SIGNIFICANT CHANGE UP
SPECIMEN SOURCE: SIGNIFICANT CHANGE UP

## 2025-02-14 DIAGNOSIS — Z71.51 DRUG ABUSE COUNSELING AND SURVEILLANCE OF DRUG ABUSER: ICD-10-CM

## 2025-02-14 DIAGNOSIS — R11.2 NAUSEA WITH VOMITING, UNSPECIFIED: ICD-10-CM

## 2025-02-14 DIAGNOSIS — R22.2 LOCALIZED SWELLING, MASS AND LUMP, TRUNK: ICD-10-CM

## 2025-02-14 DIAGNOSIS — J45.909 UNSPECIFIED ASTHMA, UNCOMPLICATED: ICD-10-CM

## 2025-02-14 DIAGNOSIS — F10.10 ALCOHOL ABUSE, UNCOMPLICATED: ICD-10-CM

## 2025-02-14 DIAGNOSIS — F90.9 ATTENTION-DEFICIT HYPERACTIVITY DISORDER, UNSPECIFIED TYPE: ICD-10-CM

## 2025-02-14 DIAGNOSIS — Z79.899 OTHER LONG TERM (CURRENT) DRUG THERAPY: ICD-10-CM

## 2025-02-14 DIAGNOSIS — R10.9 UNSPECIFIED ABDOMINAL PAIN: ICD-10-CM

## 2025-02-14 DIAGNOSIS — Z11.52 ENCOUNTER FOR SCREENING FOR COVID-19: ICD-10-CM

## 2025-02-14 DIAGNOSIS — G47.00 INSOMNIA, UNSPECIFIED: ICD-10-CM

## 2025-02-14 DIAGNOSIS — F12.10 CANNABIS ABUSE, UNCOMPLICATED: ICD-10-CM

## 2025-02-14 DIAGNOSIS — F17.200 NICOTINE DEPENDENCE, UNSPECIFIED, UNCOMPLICATED: ICD-10-CM

## 2025-02-14 DIAGNOSIS — D72.829 ELEVATED WHITE BLOOD CELL COUNT, UNSPECIFIED: ICD-10-CM

## 2025-02-14 DIAGNOSIS — Z91.013 ALLERGY TO SEAFOOD: ICD-10-CM

## 2025-02-23 ENCOUNTER — EMERGENCY (EMERGENCY)
Facility: HOSPITAL | Age: 30
LOS: 0 days | Discharge: HOME | End: 2025-02-23
Attending: EMERGENCY MEDICINE
Payer: MEDICAID

## 2025-02-23 VITALS
TEMPERATURE: 98 F | DIASTOLIC BLOOD PRESSURE: 76 MMHG | SYSTOLIC BLOOD PRESSURE: 113 MMHG | HEART RATE: 87 BPM | OXYGEN SATURATION: 97 % | HEIGHT: 72 IN | RESPIRATION RATE: 16 BRPM

## 2025-02-23 DIAGNOSIS — S93.401A SPRAIN OF UNSPECIFIED LIGAMENT OF RIGHT ANKLE, INITIAL ENCOUNTER: ICD-10-CM

## 2025-02-23 DIAGNOSIS — Z90.49 ACQUIRED ABSENCE OF OTHER SPECIFIED PARTS OF DIGESTIVE TRACT: Chronic | ICD-10-CM

## 2025-02-23 DIAGNOSIS — Z23 ENCOUNTER FOR IMMUNIZATION: ICD-10-CM
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
## 2025-02-23 DIAGNOSIS — M25.571 PAIN IN RIGHT ANKLE AND JOINTS OF RIGHT FOOT: ICD-10-CM
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
## 2025-02-23 DIAGNOSIS — J45.909 UNSPECIFIED ASTHMA, UNCOMPLICATED: ICD-10-CM

## 2025-02-23 PROCEDURE — 73610 X-RAY EXAM OF ANKLE: CPT | Mod: RT

## 2025-02-23 PROCEDURE — 90471 IMMUNIZATION ADMIN: CPT

## 2025-02-23 PROCEDURE — 73610 X-RAY EXAM OF ANKLE: CPT | Mod: 26,RT

## 2025-02-23 PROCEDURE — 73630 X-RAY EXAM OF FOOT: CPT | Mod: 26,RT

## 2025-02-23 PROCEDURE — 29515 APPLICATION SHORT LEG SPLINT: CPT | Mod: RT

## 2025-02-23 PROCEDURE — 73630 X-RAY EXAM OF FOOT: CPT | Mod: RT

## 2025-02-23 PROCEDURE — 99284 EMERGENCY DEPT VISIT MOD MDM: CPT | Mod: 25

## 2025-02-23 PROCEDURE — 90715 TDAP VACCINE 7 YRS/> IM: CPT

## 2025-02-23 RX ORDER — CLOSTRIDIUM TETANI TOXOID ANTIGEN (FORMALDEHYDE INACTIVATED), CORYNEBACTERIUM DIPHTHERIAE TOXOID ANTIGEN (FORMALDEHYDE INACTIVATED), BORDETELLA PERTUSSIS TOXOID ANTIGEN (GLUTARALDEHYDE INACTIVATED), BORDETELLA PERTUSSIS FILAMENTOUS HEMAGGLUTININ ANTIGEN (FORMALDEHYDE INACTIVATED), BORDETELLA PERTUSSIS PERTACTIN ANTIGEN, AND BORDETELLA PERTUSSIS FIMBRIAE 2/3 ANTIGEN 5; 2; 2.5; 5; 3; 5 [LF]/.5ML; [LF]/.5ML; UG/.5ML; UG/.5ML; UG/.5ML; UG/.5ML
0.5 INJECTION, SUSPENSION INTRAMUSCULAR ONCE
Refills: 0 | Status: COMPLETED | OUTPATIENT
Start: 2025-02-23 | End: 2025-02-23

## 2025-02-23 RX ORDER — IBUPROFEN 600 MG/1
600 TABLET, FILM COATED ORAL ONCE
Refills: 0 | Status: COMPLETED | OUTPATIENT
Start: 2025-02-23 | End: 2025-02-23

## 2025-02-23 RX ADMIN — CLOSTRIDIUM TETANI TOXOID ANTIGEN (FORMALDEHYDE INACTIVATED), CORYNEBACTERIUM DIPHTHERIAE TOXOID ANTIGEN (FORMALDEHYDE INACTIVATED), BORDETELLA PERTUSSIS TOXOID ANTIGEN (GLUTARALDEHYDE INACTIVATED), BORDETELLA PERTUSSIS FILAMENTOUS HEMAGGLUTININ ANTIGEN (FORMALDEHYDE INACTIVATED), BORDETELLA PERTUSSIS PERTACTIN ANTIGEN, AND BORDETELLA PERTUSSIS FIMBRIAE 2/3 ANTIGEN 0.5 MILLILITER(S): 5; 2; 2.5; 5; 3; 5 INJECTION, SUSPENSION INTRAMUSCULAR at 07:51

## 2025-02-23 RX ADMIN — IBUPROFEN 600 MILLIGRAM(S): 600 TABLET, FILM COATED ORAL at 07:54
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
## 2025-02-23 NOTE — ED ADULT NURSE NOTE - NS ED NURSE LEVEL OF CONSCIOUSNESS MENTAL STATUS
interpretation of diagnostic studies/consultation with other physicians/additional history taking/direct patient care (not related to procedure)/documentation Awake/Alert
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
## 2025-02-23 NOTE — ED ADULT NURSE NOTE - NSFALLUNIVINTERV_ED_ALL_ED
Bed/Stretcher in lowest position, wheels locked, appropriate side rails in place/Call bell, personal items and telephone in reach/Instruct patient to call for assistance before getting out of bed/chair/stretcher/Non-slip footwear applied when patient is off stretcher/[place] to call system/Physically safe environment - no spills, clutter or unnecessary equipment/Purposeful proactive rounding/Room/bathroom lighting operational, light cord in reach

## 2025-02-23 NOTE — ED PROVIDER NOTE - PATIENT PORTAL LINK FT
You can access the FollowMyHealth Patient Portal offered by [facility] by registering at the following website: [email or web address]. By joining [employer]’s FollowMyHealth portal, you will also be able to view your health information using other applications (apps) compatible with our system.

## 2025-02-23 NOTE — ED PROVIDER NOTE - PHYSICAL EXAMINATION
VITAL SIGNS: I have reviewed nursing notes and confirm.  CONSTITUTIONAL: well-appearing, non-toxic, NAD  SKIN: Warm dry, normal skin turgor  HEAD: NCAT  EYES: EOMI, PERRLA, no scleral icterus  NECK: Supple; non tender. Full ROM. Nomidline tenderness  CARD: RRR, no murmurs, rubs or gallops  RESP: clear to ausculation b/l.  No rales, rhonchi, or wheezing.  EXT: (+) Abrasion to the right medial malleolus.  Significant tenderness of the entire right foot and right ankle.  Normal DP and PT pulses.  Full range of motion.  Cap refill less than 2 seconds.  NEURO: normal motor. normal sensory  PSYCH: Cooperative, appropriate.
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
## 2025-02-23 NOTE — ED PROVIDER NOTE - NSFOLLOWUPINSTRUCTIONS_ED_ALL_ED_FT
Our Emergency Department Referral Coordinators will be reaching out to you in the next 24-48 hours from 9:00am to 5:00pm to schedule a follow up appointment. Please expect a phone call from the hospital in that time frame. If you do not receive a call or if you have any questions or concerns, you can reach them at   [phone].    Ankle Sprain    An ankle sprain is a stretch or tear in one of the tough, fiber-like tissues (ligaments) in the ankle. The ligaments in your ankle help to hold the bones of the ankle together.     CAUSES  This condition is often caused by stepping on or falling on the outer edge of the foot.    RISK FACTORS  This condition is more likely to develop in people who play sports.    SYMPTOMS  Symptoms of this condition include:    Pain in your ankle.  Swelling.  Bruising. Bruising may develop right after you sprain your ankle or 1–2 days later.  Trouble standing or walking, especially when you turn or change directions.    DIAGNOSIS  This condition is diagnosed with a physical exam. During the exam, your health care provider will press on certain parts of your foot and ankle and try to move them in certain ways. X-rays may be taken to see how severe the sprain is and to check for broken bones.    TREATMENT  This condition may be treated with:    A brace. This is used to keep the ankle from moving until it heals.  An elastic bandage. This is used to support the ankle.  Crutches.  Pain medicine.  Surgery. This may be needed if the sprain is severe.  Physical therapy. This may help to improve the range of motion in the ankle.    HOME CARE INSTRUCTIONS  Rest your ankle.  Take over-the-counter and prescription medicines only as told by your health care provider.  For 2–3 days, keep your ankle raised (elevated) above the level of your heart as much as possible.  If directed, apply ice to the area:  Put ice in a plastic bag.  Place a towel between your skin and the bag.  Leave the ice on for 20 minutes, 2–3 times a day.  If you were given a brace:  Wear it as directed.  Remove it to shower or bathe.  Try not to move your ankle much, but wiggle your toes from time to time. This helps to prevent swelling.  If you were given an elastic bandage (dressing):  Remove it to shower or bathe.  Try not to move your ankle much, but wiggle your toes from time to time. This helps to prevent swelling.  Adjust the dressing to make it more comfortable if it feels too tight.  Loosen the dressing if you have numbness or tingling in your foot, or if your foot becomes cold and blue.  If you have crutches, use them as told by your health care provider. Continue to use them until you can walk without feeling pain in your ankle.    SEEK MEDICAL CARE IF:  You have rapidly increasing bruising or swelling.  Your pain is not relieved with medicine.    SEEK IMMEDIATE MEDICAL CARE IF:  Your toes or foot becomes numb or blue.  You have severe pain that gets worse.    ADDITIONAL NOTES AND INSTRUCTIONS    Please follow up with your Primary MD in 24-48 hr.  Seek immediate medical care for any new/worsening signs or symptoms.

## 2025-02-23 NOTE — ED ADULT NURSE NOTE - PRO INTERPRETER NEED 2
5/9/2017  Called and LM on identified home phone requesting patient return my call at [phone].
Patient identified with a potential need for Chronic Care Management services. Called patient to introduce self and availability of Chronic Care Management services.   Patient informed about the following service elements:  · Health information sharing - c
English

## 2025-05-27 ENCOUNTER — EMERGENCY (EMERGENCY)
Facility: HOSPITAL | Age: 30
LOS: 0 days | Discharge: HOME | End: 2025-05-27
Attending: STUDENT IN AN ORGANIZED HEALTH CARE EDUCATION/TRAINING PROGRAM
Payer: MEDICAID

## 2025-05-27 VITALS
TEMPERATURE: 98 F | RESPIRATION RATE: 20 BRPM | SYSTOLIC BLOOD PRESSURE: 140 MMHG | HEART RATE: 83 BPM | DIASTOLIC BLOOD PRESSURE: 86 MMHG | OXYGEN SATURATION: 98 %

## 2025-05-27 VITALS
TEMPERATURE: 98 F | HEART RATE: 68 BPM | SYSTOLIC BLOOD PRESSURE: 121 MMHG | RESPIRATION RATE: 18 BRPM | OXYGEN SATURATION: 97 % | DIASTOLIC BLOOD PRESSURE: 71 MMHG

## 2025-05-27 DIAGNOSIS — F17.200 NICOTINE DEPENDENCE, UNSPECIFIED, UNCOMPLICATED: ICD-10-CM

## 2025-05-27 DIAGNOSIS — R11.2 NAUSEA WITH VOMITING, UNSPECIFIED: ICD-10-CM

## 2025-05-27 DIAGNOSIS — Z90.49 ACQUIRED ABSENCE OF OTHER SPECIFIED PARTS OF DIGESTIVE TRACT: Chronic | ICD-10-CM

## 2025-05-27 DIAGNOSIS — R10.13 EPIGASTRIC PAIN: ICD-10-CM

## 2025-05-27 DIAGNOSIS — J45.909 UNSPECIFIED ASTHMA, UNCOMPLICATED: ICD-10-CM

## 2025-05-27 LAB
ALBUMIN SERPL ELPH-MCNC: 4.9 G/DL — SIGNIFICANT CHANGE UP (ref 3.5–5.2)
ALP SERPL-CCNC: 105 U/L — SIGNIFICANT CHANGE UP (ref 30–115)
ALT FLD-CCNC: 29 U/L — SIGNIFICANT CHANGE UP (ref 0–41)
ANION GAP SERPL CALC-SCNC: 17 MMOL/L — HIGH (ref 7–14)
ANION GAP SERPL CALC-SCNC: 23 MMOL/L — HIGH (ref 7–14)
AST SERPL-CCNC: 27 U/L — SIGNIFICANT CHANGE UP (ref 0–41)
BASOPHILS # BLD AUTO: 0.09 K/UL — SIGNIFICANT CHANGE UP (ref 0–0.2)
BASOPHILS NFR BLD AUTO: 0.5 % — SIGNIFICANT CHANGE UP (ref 0–1)
BILIRUB SERPL-MCNC: 0.7 MG/DL — SIGNIFICANT CHANGE UP (ref 0.2–1.2)
BUN SERPL-MCNC: 13 MG/DL — SIGNIFICANT CHANGE UP (ref 10–20)
BUN SERPL-MCNC: 13 MG/DL — SIGNIFICANT CHANGE UP (ref 10–20)
CALCIUM SERPL-MCNC: 10.1 MG/DL — SIGNIFICANT CHANGE UP (ref 8.4–10.5)
CALCIUM SERPL-MCNC: 9.1 MG/DL — SIGNIFICANT CHANGE UP (ref 8.4–10.5)
CHLORIDE SERPL-SCNC: 103 MMOL/L — SIGNIFICANT CHANGE UP (ref 98–110)
CHLORIDE SERPL-SCNC: 105 MMOL/L — SIGNIFICANT CHANGE UP (ref 98–110)
CO2 SERPL-SCNC: 14 MMOL/L — LOW (ref 17–32)
CO2 SERPL-SCNC: 19 MMOL/L — SIGNIFICANT CHANGE UP (ref 17–32)
CREAT SERPL-MCNC: 0.8 MG/DL — SIGNIFICANT CHANGE UP (ref 0.7–1.5)
CREAT SERPL-MCNC: 1 MG/DL — SIGNIFICANT CHANGE UP (ref 0.7–1.5)
EGFR: 105 ML/MIN/1.73M2 — SIGNIFICANT CHANGE UP
EGFR: 105 ML/MIN/1.73M2 — SIGNIFICANT CHANGE UP
EGFR: 124 ML/MIN/1.73M2 — SIGNIFICANT CHANGE UP
EGFR: 124 ML/MIN/1.73M2 — SIGNIFICANT CHANGE UP
EOSINOPHIL # BLD AUTO: 0 K/UL — SIGNIFICANT CHANGE UP (ref 0–0.7)
EOSINOPHIL NFR BLD AUTO: 0 % — SIGNIFICANT CHANGE UP (ref 0–8)
GLUCOSE SERPL-MCNC: 105 MG/DL — HIGH (ref 70–99)
GLUCOSE SERPL-MCNC: 128 MG/DL — HIGH (ref 70–99)
HCT VFR BLD CALC: 50.8 % — SIGNIFICANT CHANGE UP (ref 42–52)
HGB BLD-MCNC: 18.2 G/DL — HIGH (ref 14–18)
IMM GRANULOCYTES NFR BLD AUTO: 0.8 % — HIGH (ref 0.1–0.3)
LIDOCAIN IGE QN: 12 U/L — SIGNIFICANT CHANGE UP (ref 7–60)
LYMPHOCYTES # BLD AUTO: 1.97 K/UL — SIGNIFICANT CHANGE UP (ref 1.2–3.4)
LYMPHOCYTES # BLD AUTO: 11.4 % — LOW (ref 20.5–51.1)
MCHC RBC-ENTMCNC: 30.2 PG — SIGNIFICANT CHANGE UP (ref 27–31)
MCHC RBC-ENTMCNC: 35.8 G/DL — SIGNIFICANT CHANGE UP (ref 32–37)
MCV RBC AUTO: 84.4 FL — SIGNIFICANT CHANGE UP (ref 80–94)
MONOCYTES # BLD AUTO: 1.02 K/UL — HIGH (ref 0.1–0.6)
MONOCYTES NFR BLD AUTO: 5.9 % — SIGNIFICANT CHANGE UP (ref 1.7–9.3)
NEUTROPHILS # BLD AUTO: 14.01 K/UL — HIGH (ref 1.4–6.5)
NEUTROPHILS NFR BLD AUTO: 81.4 % — HIGH (ref 42.2–75.2)
NRBC BLD AUTO-RTO: 0 /100 WBCS — SIGNIFICANT CHANGE UP (ref 0–0)
PLATELET # BLD AUTO: 366 K/UL — SIGNIFICANT CHANGE UP (ref 130–400)
PMV BLD: 9.6 FL — SIGNIFICANT CHANGE UP (ref 7.4–10.4)
POTASSIUM SERPL-MCNC: 4.5 MMOL/L — SIGNIFICANT CHANGE UP (ref 3.5–5)
POTASSIUM SERPL-MCNC: 4.5 MMOL/L — SIGNIFICANT CHANGE UP (ref 3.5–5)
POTASSIUM SERPL-SCNC: 4.5 MMOL/L — SIGNIFICANT CHANGE UP (ref 3.5–5)
POTASSIUM SERPL-SCNC: 4.5 MMOL/L — SIGNIFICANT CHANGE UP (ref 3.5–5)
PROT SERPL-MCNC: 8.5 G/DL — HIGH (ref 6–8)
RBC # BLD: 6.02 M/UL — SIGNIFICANT CHANGE UP (ref 4.7–6.1)
RBC # FLD: 13.6 % — SIGNIFICANT CHANGE UP (ref 11.5–14.5)
SODIUM SERPL-SCNC: 139 MMOL/L — SIGNIFICANT CHANGE UP (ref 135–146)
SODIUM SERPL-SCNC: 142 MMOL/L — SIGNIFICANT CHANGE UP (ref 135–146)
WBC # BLD: 17.23 K/UL — HIGH (ref 4.8–10.8)
WBC # FLD AUTO: 17.23 K/UL — HIGH (ref 4.8–10.8)

## 2025-05-27 PROCEDURE — 80048 BASIC METABOLIC PNL TOTAL CA: CPT

## 2025-05-27 PROCEDURE — 96361 HYDRATE IV INFUSION ADD-ON: CPT

## 2025-05-27 PROCEDURE — 85025 COMPLETE CBC W/AUTO DIFF WBC: CPT

## 2025-05-27 PROCEDURE — 99284 EMERGENCY DEPT VISIT MOD MDM: CPT | Mod: 25

## 2025-05-27 PROCEDURE — 96374 THER/PROPH/DIAG INJ IV PUSH: CPT

## 2025-05-27 PROCEDURE — 80053 COMPREHEN METABOLIC PANEL: CPT

## 2025-05-27 PROCEDURE — 99285 EMERGENCY DEPT VISIT HI MDM: CPT

## 2025-05-27 PROCEDURE — 96372 THER/PROPH/DIAG INJ SC/IM: CPT | Mod: XU

## 2025-05-27 PROCEDURE — 36415 COLL VENOUS BLD VENIPUNCTURE: CPT

## 2025-05-27 PROCEDURE — 83690 ASSAY OF LIPASE: CPT

## 2025-05-27 PROCEDURE — 82962 GLUCOSE BLOOD TEST: CPT

## 2025-05-27 PROCEDURE — 96375 TX/PRO/DX INJ NEW DRUG ADDON: CPT

## 2025-05-27 RX ORDER — DIPHENHYDRAMINE HYDROCHLORIDE AND LIDOCAINE HYDROCHLORIDE AND ALUMINUM HYDROXIDE AND MAGNESIUM HYDRO
30 KIT ONCE
Refills: 0 | Status: COMPLETED | OUTPATIENT
Start: 2025-05-27 | End: 2025-05-27

## 2025-05-27 RX ORDER — ONDANSETRON HCL/PF 4 MG/2 ML
4 VIAL (ML) INJECTION ONCE
Refills: 0 | Status: COMPLETED | OUTPATIENT
Start: 2025-05-27 | End: 2025-05-27

## 2025-05-27 RX ORDER — HALOPERIDOL 10 MG/1
5 TABLET ORAL ONCE
Refills: 0 | Status: COMPLETED | OUTPATIENT
Start: 2025-05-27 | End: 2025-05-27

## 2025-05-27 RX ORDER — SODIUM CHLORIDE 9 G/1000ML
1000 INJECTION, SOLUTION INTRAVENOUS ONCE
Refills: 0 | Status: COMPLETED | OUTPATIENT
Start: 2025-05-27 | End: 2025-05-27

## 2025-05-27 RX ADMIN — Medication 1000 MILLILITER(S): at 10:59

## 2025-05-27 RX ADMIN — Medication 4 MILLIGRAM(S): at 11:52

## 2025-05-27 RX ADMIN — SODIUM CHLORIDE 1000 MILLILITER(S): 9 INJECTION, SOLUTION INTRAVENOUS at 12:38

## 2025-05-27 RX ADMIN — Medication 20 MILLIGRAM(S): at 11:51

## 2025-05-27 RX ADMIN — Medication 1000 MILLILITER(S): at 12:38

## 2025-05-27 RX ADMIN — HALOPERIDOL 5 MILLIGRAM(S): 10 TABLET ORAL at 10:34

## 2025-05-27 RX ADMIN — DIPHENHYDRAMINE HYDROCHLORIDE AND LIDOCAINE HYDROCHLORIDE AND ALUMINUM HYDROXIDE AND MAGNESIUM HYDRO 30 MILLILITER(S): KIT at 11:51

## 2025-05-27 NOTE — ED PROVIDER NOTE - DIFFERENTIAL DIAGNOSIS
differential dx includes but is not limited to:  cannabinoid hyperemesis syndrome, pancreatitis, electrolyte derangement, dehydration Differential Diagnosis

## 2025-05-27 NOTE — ED PROVIDER NOTE - PATIENT PORTAL LINK FT
You can access the FollowMyHealth Patient Portal offered by [facility] by registering at the following website: [email or web address]. By joining [employer]’s FollowMyHealth portal, you will also be able to view your health information using other applications (apps) compatible with our system.

## 2025-05-27 NOTE — ED ADULT TRIAGE NOTE - BEFAST BALANCE
Returned call to patient. Spoke with patient and his significant other Geeta. Patient states he has been unable to sleep due to severe left shoulder pain. He reports he took 3 doses of ZzzQuil in 4 hours last night and did not feel well after with chest pain. Chest pain improved, but left shoulder ongoing and limiting his activity. He has been taking Norco as prescribed with no relief. He notified his PCP who prescribed him a Medrol Dosepak. Geeta states they have not picked it up yet. Informed systemic steroids are NOT recommended s/p cardiac surgery as they will impede sternal healing. Recommend Flexeril TID prn for additional pain relief, advised safety precautions while taking. May also try gentle massage, ice/heat application, and OTC topical lidocaine, Icy-Hot, or tiger balm avoiding surgical incisions. Encouraged to continue to take Norco as prescribed and avoid ZzzQuil. If severe pain persists, recommend evaluation in urgent care or ED. Patient scheduled for consultation with orthopaedics on 5/31/24, okay to proceed with localized left shoulder steroid injection if recommended. Encouraged patient and Geeta to call our office with any additional surgical questions or concerns.     AUSTIN Wilks  [facility]  Cardiovascular & Thoracic Surgery     No

## 2025-05-27 NOTE — ED PROVIDER NOTE - NSFOLLOWUPINSTRUCTIONS_ED_ALL_ED_FT
Nausea / Vomiting    Nausea is the feeling that you have to vomit. As nausea gets worse, it can lead to vomiting. Vomiting puts you at an increased risk for dehydration. Older adults and people with other diseases or a weak immune system are at higher risk for dehydration. Drink clear fluids in small but frequent amounts as tolerated. Eat bland, easy-to-digest foods in small amounts as tolerated.    SEEK IMMEDIATE MEDICAL CARE IF YOU HAVE ANY OF THE FOLLOWING SYMPTOMS: fever, inability to keep sufficient fluids down, black or bloody vomitus, black or bloody stools, lightheadedness/dizziness, chest pain, severe headache, rash, shortness of breath, cold or clammy skin, confusion, pain with urination, or any signs of dehydration.    Cyclic Vomiting Syndrome    WHAT YOU NEED TO KNOW:    What is cyclic vomiting syndrome? Cyclic vomiting syndrome is a condition that causes you to vomit many times in a row for no known reason. You first have a sense that an episode is about to start. This is followed by nausea, and vomiting that can continue for hours to days. This may happen over and over for several days. Then you have no nausea or vomiting for weeks or months before the cycle starts again.    What other signs and symptoms may I have?   - Retching or heaving (vomiting but nothing comes out), or gagging  - Feeling dizzy or tired  - Sensitivity to light or sound  - Abdominal pain, loss of appetite, or diarrhea  - A fever    What causes or increases my risk for cyclic vomiting syndrome?   - Migraine headaches, or your mother had migraines  - An anxiety disorder  - A problem with your digestive system or your nervous system  - A hormone imbalance  - Using large amounts of marijuana    How is cyclic vomiting syndrome diagnosed and treated?     No test is available to diagnose cyclic vomiting syndrome. Your healthcare provider will examine you and ask about your symptoms. He or she will ask if you have had 3 or more episodes in the past year. Your provider will ask if you or anyone in your family has cyclic vomiting syndrome or migraines. Tests may be used to rule out medical conditions that can cause vomiting. An endoscopy is a test that uses a scope to see your stomach and intestines. CT or MRI scans may be used to check for blockages or other problems. A motility test checks how well food moves through your digestive system.    Your symptoms may be controlled with medicines. Medicines may be used to prevent or stop migraine headaches. This may be given if you have a history of migraines or are at risk because of a family history of migraines. Medicine may also be used to reduce the amount of acid your stomach makes. You may also be given medicine to reduce nausea.    What can I do to prevent or manage episodes?     Avoid triggers. Certain foods can trigger episodes, such as chocolate, cheese, and monosodium glutamate (MSG). Caffeine can also trigger an episode. Your healthcare provider or dietitian can help you identify foods that trigger an episode. This will help you create meal plans to avoid those triggers. Other triggers include too much exercise, motion sickness, or being in hot weather too long.    Drink more liquids as directed. Vomiting can lead to dehydration. It is important to drink more liquids to help replace lost body fluids. Ask your healthcare provider how much liquid to drink each day and which liquids are best for you. Your provider may recommend that you drink an oral rehydration solution (ORS). ORS contains water, salts, and sugar that are needed to replace the lost body fluids. Ask what kind of ORS to use, how much to drink, and where to get it.    Eat smaller meals, more often. Eat small amounts of food every 2 to 3 hours, even if you are not hungry. Food in your stomach may decrease your nausea.    Control stress. Stress or anxiety can trigger an episode. Find ways to relax and manage your stress. Get more rest and sleep.     Do not drink alcohol. Alcohol may upset or irritate your stomach. Too much alcohol can also cause nausea and vomiting.    Do not use marijuana (cannabis). Repeated use of marijuana over a long period of time (chronic use) can cause episodes. This is called cannabis hyperemesis syndrome. If you have an episode caused by marijuana use, a hot shower may relieve your symptoms. Ask your healthcare provider for information if want to quit using marijuana and need help quitting.    When should I seek immediate care?   - You see blood in your vomit or your bowel movements.  - You have sudden, severe pain in your chest and upper abdomen after hard vomiting or retching.  - You have swelling in your neck and chest.   - You are dizzy, cold, and thirsty, and your eyes and mouth are dry.  - You are urinating very little or not at all.  - You have muscle weakness, leg cramps, and trouble breathing.  - Your heart is beating much faster than normal.  - You continue to vomit for more than 48 hours.    When should I contact my healthcare provider?   - You have frequent dry heaves (vomiting but nothing comes out).  - You have questions or concerns about your condition or care.

## 2025-05-27 NOTE — ED ADULT NURSE NOTE - NSFALLHARMRISKINTERV_ED_ALL_ED
Assistance OOB with selected safe patient handling equipment if applicable/Assistance with ambulation/Communicate risk of Fall with Harm to all staff, patient, and family/Monitor gait and stability/Monitor for mental status changes and reorient to person, place, and time, as needed/Provide visual cue: red socks, yellow wristband, yellow gown, etc/Reinforce activity limits and safety measures with patient and family/Toileting schedule using arm’s reach rule for commode and bathroom/Use of alarms - bed, stretcher, chair and/or video monitoring/Bed in lowest position, wheels locked, appropriate side rails in place/Call bell, personal items and telephone in reach/Instruct patient to call for assistance before getting out of bed/chair/stretcher/Non-slip footwear applied when patient is off stretcher/[place] to call system/Physically safe environment - no spills, clutter or unnecessary equipment/Purposeful Proactive Rounding/Room/bathroom lighting operational, light cord in reach

## 2025-05-27 NOTE — ED PROVIDER NOTE - PRINCIPAL DIAGNOSIS
Mildly to Moderately Impaired: difficulty hearing in some environments or speaker may need to increase volume or speak distinctly/hearing aids bilateral
Nausea & vomiting

## 2025-05-27 NOTE — ED PROVIDER NOTE - PROGRESS NOTE DETAILS
On reexamination patient states symptoms have improved significantly, tolerating p.o.  Patient states nausea has improved and wants to go home. pending rpt bmp

## 2025-05-27 NOTE — ED PROVIDER NOTE - OBJECTIVE STATEMENT
30-year-old male past medical history of asthma, daily marijuana use, alcohol use disorder (last drink several months ago) presenting for evaluation of vomiting since this morning.  Patient states he drank 5 [place] ice teas overnight and smoked marijuana 2 hours ago.  Endorses similar symptoms in the past with hospitalization for.  Denies chest pain, SOB, dizziness, lightheadedness, UTI symptoms, URI symptoms.

## 2025-05-27 NOTE — ED PROVIDER NOTE - PHYSICAL EXAMINATION
CONSTITUTIONAL: well developed, nontoxic appearing, in no acute distress but actively retching in ED, speaking in full sentences  SKIN: warm, dry, no rash, cap refill < 2 seconds  HEENT: normocephalic, atraumatic, no conjunctival erythema, moist mucous membranes, patent airway  NECK: supple  CV:  regular rate, regular rhythm, 2+ radial pulses bilaterally  RESP: no wheezes, no rales, no rhonchi, normal work of breathing  ABD: soft, no tenderness, nondistended, no rebound, no guarding  MSK: normal ROM, no cyanosis, no edema  NEURO: alert, oriented, grossly unremarkable  PSYCH: cooperative, appropriate

## 2025-05-27 NOTE — ED PROVIDER NOTE - CLINICAL SUMMARY MEDICAL DECISION MAKING FREE TEXT BOX
Pt here with nbnb vomiting in setting of daily marijuana use and recent etoh use. Vitals wnl in ED. No abd tenderness on exam. Low suspicion for appendicitis, diverticulitis, testicular torsion, epididymoorchitis given no lower abd tenderness on exam and no concerning  sx. Low suspicion for pancreatitis, cholecystitis, choledocholithiasis, cholangitis given no upper abd tenderness or s/sx of obstructive jaundice. Low suspicion for SBO given no abd distension, normal BMs/passing gas. Low suspicion for hernia strangulation or incarceration given no e/o hernia on exam, no overlying skin changes. No further imaging indicated at this time. Plan for labs r/o pancreatitis, electrolyte derangement, dehydration. Given GI cocktail. Labs notable for elevated AG and low bicarb, suspect from elevated lactate in setting of vomiting. WBC elevated likely 2/2 vomiting. Repeat BMP shows improvement of AG and bicarb. On reassessment pt feels better and tolerated po. Seen by SBIRT. Stable for discharge. Strict ED return precautions given. Pt verbalized understanding and was agreeable with plan.

## 2025-05-27 NOTE — ED PROVIDER NOTE - ATTENDING CONTRIBUTION TO CARE
29 yo M with hx of etoh abuse, asthma, daily marijuana use who presents with nbnb vomiting which started this AM associated with epigastric pain. No fever, cp, sob, diarrhea, dysuria. Drank 5 [place] ice teas last night, does not drink daily. Hx of appendectomy, no other abd surg.    Does not remember PMD name    CONSTITUTIONAL: well developed, nontoxic appearing, in no acute distress but actively retching in ED, speaking in full sentences  SKIN: warm, dry, no rash, cap refill < 2 seconds  HEENT: normocephalic, atraumatic, no conjunctival erythema, moist mucous membranes, patent airway  NECK: supple  CV:  regular rate, regular rhythm, 2+ radial pulses bilaterally  RESP: no wheezes, no rales, no rhonchi, normal work of breathing  ABD: soft, no tenderness, nondistended, no rebound, no guarding  MSK: normal ROM, no cyanosis, no edema  NEURO: alert, oriented, grossly unremarkable  PSYCH: cooperative, appropriate    A&P:  Pt here with nbnb *** 31 yo M with hx of etoh abuse, asthma, daily marijuana use who presents with nbnb vomiting which started this AM associated with epigastric pain. No fever, cp, sob, diarrhea, dysuria. Drank 5 [place] ice teas last night, does not drink daily. Hx of appendectomy, no other abd surg.    Does not remember PMD name    CONSTITUTIONAL: well developed, nontoxic appearing, in no acute distress but actively retching in ED, speaking in full sentences  SKIN: warm, dry, no rash, cap refill < 2 seconds  HEENT: normocephalic, atraumatic, no conjunctival erythema, moist mucous membranes, patent airway  NECK: supple  CV:  regular rate, regular rhythm, 2+ radial pulses bilaterally  RESP: no wheezes, no rales, no rhonchi, normal work of breathing  ABD: soft, no tenderness, nondistended, no rebound, no guarding  MSK: normal ROM, no cyanosis, no edema  NEURO: alert, oriented, grossly unremarkable  PSYCH: cooperative, appropriate    A&P:  Pt here with nbnb vomiting in setting of daily marijuana use and recent etoh use. Vitals wnl in ED. No abd tenderness on exam. Low suspicion for appendicitis, diverticulitis, testicular torsion, epididymoorchitis given no lower abd tenderness on exam and no concerning  sx. Low suspicion for pancreatitis, cholecystitis, choledocholithiasis, cholangitis given no upper abd tenderness or s/sx of obstructive jaundice. Low suspicion for SBO given no abd distension, normal BMs/passing gas. Low suspicion for hernia strangulation or incarceration given no e/o hernia on exam, no overlying skin changes. No further imaging indicated at this time. Plan for labs r/o pancreatitis, electrolyte derangement, dehydration. GI cocktail and reassess. Stable, controlled w/ no meds

## 2025-05-28 ENCOUNTER — INPATIENT (INPATIENT)
Facility: HOSPITAL | Age: 30
LOS: 1 days | Discharge: HOME | DRG: 249 | End: 2025-05-30
Attending: HOSPITALIST | Admitting: HOSPITALIST
Payer: MEDICAID

## 2025-05-28 VITALS
RESPIRATION RATE: 18 BRPM | DIASTOLIC BLOOD PRESSURE: 80 MMHG | HEIGHT: 72 IN | HEART RATE: 100 BPM | SYSTOLIC BLOOD PRESSURE: 123 MMHG | OXYGEN SATURATION: 96 % | WEIGHT: 169.98 LBS | TEMPERATURE: 99 F

## 2025-05-28 DIAGNOSIS — Z90.49 ACQUIRED ABSENCE OF OTHER SPECIFIED PARTS OF DIGESTIVE TRACT: Chronic | ICD-10-CM

## 2025-05-28 DIAGNOSIS — K52.839 MICROSCOPIC COLITIS, UNSPECIFIED: ICD-10-CM

## 2025-05-28 LAB
ALBUMIN SERPL ELPH-MCNC: 4.5 G/DL — SIGNIFICANT CHANGE UP (ref 3.5–5.2)
ALP SERPL-CCNC: 92 U/L — SIGNIFICANT CHANGE UP (ref 30–115)
ALT FLD-CCNC: 42 U/L — HIGH (ref 0–41)
ANION GAP SERPL CALC-SCNC: 20 MMOL/L — HIGH (ref 7–14)
AST SERPL-CCNC: 49 U/L — HIGH (ref 0–41)
BASE EXCESS BLDV CALC-SCNC: 2.1 MMOL/L — SIGNIFICANT CHANGE UP (ref -2–3)
BASE EXCESS BLDV CALC-SCNC: 3.2 MMOL/L — HIGH (ref -2–3)
BASOPHILS # BLD AUTO: 0.06 K/UL — SIGNIFICANT CHANGE UP (ref 0–0.2)
BASOPHILS NFR BLD AUTO: 0.4 % — SIGNIFICANT CHANGE UP (ref 0–1)
BILIRUB SERPL-MCNC: 1.5 MG/DL — HIGH (ref 0.2–1.2)
BUN SERPL-MCNC: 18 MG/DL — SIGNIFICANT CHANGE UP (ref 10–20)
CA-I SERPL-SCNC: 1.09 MMOL/L — LOW (ref 1.15–1.33)
CA-I SERPL-SCNC: 1.15 MMOL/L — SIGNIFICANT CHANGE UP (ref 1.15–1.33)
CALCIUM SERPL-MCNC: 10 MG/DL — SIGNIFICANT CHANGE UP (ref 8.4–10.5)
CHLORIDE SERPL-SCNC: 99 MMOL/L — SIGNIFICANT CHANGE UP (ref 98–110)
CO2 SERPL-SCNC: 18 MMOL/L — SIGNIFICANT CHANGE UP (ref 17–32)
CREAT SERPL-MCNC: 1.1 MG/DL — SIGNIFICANT CHANGE UP (ref 0.7–1.5)
EGFR: 94 ML/MIN/1.73M2 — SIGNIFICANT CHANGE UP
EGFR: 94 ML/MIN/1.73M2 — SIGNIFICANT CHANGE UP
EOSINOPHIL # BLD AUTO: 0.01 K/UL — SIGNIFICANT CHANGE UP (ref 0–0.7)
EOSINOPHIL NFR BLD AUTO: 0.1 % — SIGNIFICANT CHANGE UP (ref 0–8)
GAS PNL BLDV: 131 MMOL/L — LOW (ref 136–145)
GAS PNL BLDV: 133 MMOL/L — LOW (ref 136–145)
GAS PNL BLDV: SIGNIFICANT CHANGE UP
GLUCOSE SERPL-MCNC: 132 MG/DL — HIGH (ref 70–99)
HCO3 BLDV-SCNC: 18 MMOL/L — LOW (ref 22–29)
HCO3 BLDV-SCNC: 23 MMOL/L — SIGNIFICANT CHANGE UP (ref 22–29)
HCT VFR BLD CALC: 45.4 % — SIGNIFICANT CHANGE UP (ref 42–52)
HCT VFR BLDA CALC: 52 % — HIGH (ref 39–51)
HCT VFR BLDA CALC: 55 % — HIGH (ref 39–51)
HGB BLD CALC-MCNC: 17.3 G/DL — SIGNIFICANT CHANGE UP (ref 12.6–17.4)
HGB BLD CALC-MCNC: 18.4 G/DL — HIGH (ref 12.6–17.4)
HGB BLD-MCNC: 16.6 G/DL — SIGNIFICANT CHANGE UP (ref 14–18)
IMM GRANULOCYTES NFR BLD AUTO: 0.5 % — HIGH (ref 0.1–0.3)
LACTATE BLDV-MCNC: 2.5 MMOL/L — HIGH (ref 0.5–2)
LACTATE BLDV-MCNC: 6 MMOL/L — CRITICAL HIGH (ref 0.5–2)
LACTATE SERPL-SCNC: 1.8 MMOL/L — SIGNIFICANT CHANGE UP (ref 0.7–2)
LIDOCAIN IGE QN: 16 U/L — SIGNIFICANT CHANGE UP (ref 7–60)
LYMPHOCYTES # BLD AUTO: 1.34 K/UL — SIGNIFICANT CHANGE UP (ref 1.2–3.4)
LYMPHOCYTES # BLD AUTO: 8.5 % — LOW (ref 20.5–51.1)
MCHC RBC-ENTMCNC: 30.4 PG — SIGNIFICANT CHANGE UP (ref 27–31)
MCHC RBC-ENTMCNC: 36.6 G/DL — SIGNIFICANT CHANGE UP (ref 32–37)
MCV RBC AUTO: 83.2 FL — SIGNIFICANT CHANGE UP (ref 80–94)
MONOCYTES # BLD AUTO: 1.5 K/UL — HIGH (ref 0.1–0.6)
MONOCYTES NFR BLD AUTO: 9.5 % — HIGH (ref 1.7–9.3)
NEUTROPHILS # BLD AUTO: 12.73 K/UL — HIGH (ref 1.4–6.5)
NEUTROPHILS NFR BLD AUTO: 81 % — HIGH (ref 42.2–75.2)
NRBC BLD AUTO-RTO: 0 /100 WBCS — SIGNIFICANT CHANGE UP (ref 0–0)
PCO2 BLDV: 23 MMHG — LOW (ref 42–55)
PCO2 BLDV: <19 MMHG — LOW (ref 42–55)
PH BLDV: 7.6 — CRITICAL HIGH (ref 7.32–7.43)
PH BLDV: 7.67 — CRITICAL HIGH (ref 7.32–7.43)
PLATELET # BLD AUTO: 327 K/UL — SIGNIFICANT CHANGE UP (ref 130–400)
PMV BLD: 9.9 FL — SIGNIFICANT CHANGE UP (ref 7.4–10.4)
PO2 BLDV: 118 MMHG — HIGH (ref 25–45)
PO2 BLDV: 88 MMHG — HIGH (ref 25–45)
POTASSIUM BLDV-SCNC: 3.5 MMOL/L — SIGNIFICANT CHANGE UP (ref 3.5–5.1)
POTASSIUM BLDV-SCNC: 3.7 MMOL/L — SIGNIFICANT CHANGE UP (ref 3.5–5.1)
POTASSIUM SERPL-MCNC: 4 MMOL/L — SIGNIFICANT CHANGE UP (ref 3.5–5)
POTASSIUM SERPL-SCNC: 4 MMOL/L — SIGNIFICANT CHANGE UP (ref 3.5–5)
PROT SERPL-MCNC: 7.6 G/DL — SIGNIFICANT CHANGE UP (ref 6–8)
RBC # BLD: 5.46 M/UL — SIGNIFICANT CHANGE UP (ref 4.7–6.1)
RBC # FLD: 13.2 % — SIGNIFICANT CHANGE UP (ref 11.5–14.5)
SAO2 % BLDV: 98.8 % — HIGH (ref 67–88)
SAO2 % BLDV: 99.1 % — HIGH (ref 67–88)
SODIUM SERPL-SCNC: 137 MMOL/L — SIGNIFICANT CHANGE UP (ref 135–146)
WBC # BLD: 15.72 K/UL — HIGH (ref 4.8–10.8)
WBC # FLD AUTO: 15.72 K/UL — HIGH (ref 4.8–10.8)

## 2025-05-28 PROCEDURE — 83605 ASSAY OF LACTIC ACID: CPT

## 2025-05-28 PROCEDURE — 83735 ASSAY OF MAGNESIUM: CPT

## 2025-05-28 PROCEDURE — 85027 COMPLETE CBC AUTOMATED: CPT

## 2025-05-28 PROCEDURE — 99223 1ST HOSP IP/OBS HIGH 75: CPT

## 2025-05-28 PROCEDURE — 85025 COMPLETE CBC W/AUTO DIFF WBC: CPT

## 2025-05-28 PROCEDURE — 74177 CT ABD & PELVIS W/CONTRAST: CPT | Mod: 26

## 2025-05-28 PROCEDURE — 36415 COLL VENOUS BLD VENIPUNCTURE: CPT

## 2025-05-28 PROCEDURE — 99285 EMERGENCY DEPT VISIT HI MDM: CPT

## 2025-05-28 PROCEDURE — 80053 COMPREHEN METABOLIC PANEL: CPT

## 2025-05-28 RX ORDER — ONDANSETRON HCL/PF 4 MG/2 ML
4 VIAL (ML) INJECTION ONCE
Refills: 0 | Status: COMPLETED | OUTPATIENT
Start: 2025-05-28 | End: 2025-05-28

## 2025-05-28 RX ORDER — METRONIDAZOLE 250 MG
500 TABLET ORAL ONCE
Refills: 0 | Status: COMPLETED | OUTPATIENT
Start: 2025-05-28 | End: 2025-05-28

## 2025-05-28 RX ORDER — CIPROFLOXACIN HCL 250 MG
400 TABLET ORAL ONCE
Refills: 0 | Status: COMPLETED | OUTPATIENT
Start: 2025-05-28 | End: 2025-05-28

## 2025-05-28 RX ORDER — ENOXAPARIN SODIUM 100 MG/ML
40 INJECTION SUBCUTANEOUS EVERY 24 HOURS
Refills: 0 | Status: DISCONTINUED | OUTPATIENT
Start: 2025-05-28 | End: 2025-05-30

## 2025-05-28 RX ORDER — CIPROFLOXACIN HCL 250 MG
400 TABLET ORAL EVERY 12 HOURS
Refills: 0 | Status: DISCONTINUED | OUTPATIENT
Start: 2025-05-28 | End: 2025-05-30

## 2025-05-28 RX ORDER — SODIUM CHLORIDE 9 G/1000ML
1000 INJECTION, SOLUTION INTRAVENOUS ONCE
Refills: 0 | Status: COMPLETED | OUTPATIENT
Start: 2025-05-28 | End: 2025-05-28

## 2025-05-28 RX ORDER — ALBUTEROL SULFATE 2.5 MG/3ML
2 VIAL, NEBULIZER (ML) INHALATION EVERY 6 HOURS
Refills: 0 | Status: DISCONTINUED | OUTPATIENT
Start: 2025-05-28 | End: 2025-05-30

## 2025-05-28 RX ORDER — METRONIDAZOLE 250 MG
500 TABLET ORAL EVERY 8 HOURS
Refills: 0 | Status: DISCONTINUED | OUTPATIENT
Start: 2025-05-28 | End: 2025-05-30

## 2025-05-28 RX ORDER — FOLIC ACID 1 MG/1
1 TABLET ORAL DAILY
Refills: 0 | Status: DISCONTINUED | OUTPATIENT
Start: 2025-05-28 | End: 2025-05-30

## 2025-05-28 RX ORDER — LACTOBACILLUS ACIDOPHILUS/PECT 75 MM-100
1 CAPSULE ORAL DAILY
Refills: 0 | Status: DISCONTINUED | OUTPATIENT
Start: 2025-05-28 | End: 2025-05-30

## 2025-05-28 RX ORDER — ONDANSETRON HCL/PF 4 MG/2 ML
4 VIAL (ML) INJECTION EVERY 6 HOURS
Refills: 0 | Status: DISCONTINUED | OUTPATIENT
Start: 2025-05-28 | End: 2025-05-30

## 2025-05-28 RX ORDER — ACETAMINOPHEN 500 MG/5ML
975 LIQUID (ML) ORAL EVERY 8 HOURS
Refills: 0 | Status: DISCONTINUED | OUTPATIENT
Start: 2025-05-28 | End: 2025-05-30

## 2025-05-28 RX ADMIN — Medication 4 MILLIGRAM(S): at 16:14

## 2025-05-28 RX ADMIN — Medication 200 MILLIGRAM(S): at 18:03

## 2025-05-28 RX ADMIN — Medication 4 MILLIGRAM(S): at 22:56

## 2025-05-28 RX ADMIN — Medication 1000 MILLILITER(S): at 14:14

## 2025-05-28 RX ADMIN — Medication 100 MILLIGRAM(S): at 17:09

## 2025-05-28 RX ADMIN — Medication 4 MILLIGRAM(S): at 17:29

## 2025-05-28 RX ADMIN — Medication 4 MILLIGRAM(S): at 13:19

## 2025-05-28 RX ADMIN — Medication 75 MILLILITER(S): at 19:32

## 2025-05-28 RX ADMIN — Medication 1000 MILLILITER(S): at 13:19

## 2025-05-28 RX ADMIN — SODIUM CHLORIDE 1000 MILLILITER(S): 9 INJECTION, SOLUTION INTRAVENOUS at 16:14

## 2025-05-28 NOTE — ED ADULT TRIAGE NOTE - CHIEF COMPLAINT QUOTE
BIBA from home for vomiting, abdominal pain  and tremors that started yesterday. AAOX4 In triage. Denies any alcohol intake and drug intake. . Denies any chest pain and SOB BIBA from home for vomiting, abdominal pain  and tremors that started yesterday. AAOX4 In triage. Denies any alcohol intake and drug intake. Denies drinking daily. . Denies any chest pain and SOB

## 2025-05-28 NOTE — ED PROVIDER NOTE - NS ED ROS FT
Review of Systems:  	•	CONSTITUTIONAL - no fever, +diaphoresis, no chills  	•	SKIN - no rash  	•	HEMATOLOGIC - no bleeding, no bruising  	•	EYES - no eye pain, no blurry vision  	•	ENT - no congestion  	•	RESPIRATORY - no shortness of breath, no cough  	•	CARDIAC - no chest pain, no palpitations  	•	GI - +abd pain,+ nausea, + vomiting, no diarrhea, no constipation  	•	GENITO-URINARY - no dysuria; no hematuria, no increased urinary frequency  	•	MUSCULOSKELETAL - no joint paint, no swelling, no redness  	•	NEUROLOGIC - no weakness, no headache   	All other ROS are negative except as documented in HPI.

## 2025-05-28 NOTE — ED PROVIDER NOTE - OBJECTIVE STATEMENT
30-year-old male past medical history of asthma, daily marijuana use, prior ETOH abuse presents to the ER with c/o nausea and vomiting x 2 days. Was in this ER yesterday for same symptoms. Reports symptoms began after going to a party and drinking alcohol and eating food. States since arriving home he has been unable to tolerate liquids without vomiting. Has generalized abdominal pain and cold sweats. Denies fever, sick contacts, diarrhea, or other complaints.

## 2025-05-28 NOTE — H&P ADULT - ASSESSMENT
30-year-old male past medical history of asthma, daily marijuana use, prior ETOH abuse presents to the ER with c/o nausea and vomiting x 2 days. Was in this ER yesterday for same symptoms. Reports symptoms began after going to a party and drinking alcohol and eating food. States since arriving home he has been unable to tolerate liquids without vomiting. Has generalized abdominal pain and cold sweats. Denies fever, sick contacts, diarrhea, or other complaints.    #Sepsis POA  #Severe Colitis w/ inability to tolerate PO  - Vitals: Temp 98.8F, /80, , RR 18, SpO2 96% on RA  - Labs: WBC 15.72, AG 20, VBG lactate 6.0>>2.5  - CT A/P w/ IV contrast shows:  1. No bowel obstruction  2. Post appendectomy.  3. Circumferential wall thickening of the sigmoid colon compatible with colitis as well as element of underdistention  - s/p 3L IVF bolus and Cipro and Flagyl IV in the ED  - c/w Cipro and Flagyl IV  - start probiotics  - start gentle IVF  - advance diet as tolerate    #KRISTY - likely pre-renal  - Cr 1.1 (baseline 0.8)  - s/p 3L IVF bolus and on gentle IVF  - monitor Cr    #Transaminitis  - AST 49, ALT 42  - CT A/P unremarkable for hepatobiliary pathology  - likely elevated in acute illness  - continue to monitor    #Asthma - not in exacerbation  - c/w home albuterol PRN    #Marijuana Use / Active Smoker  #ETOH abuse history  - c/w home thiamine and folic acid    #DVT ppx: Lovenox SubQ  #GI ppx: n/a  #Diet: advance as tolerate - regular  #Activity: AAT  #Code Status: Full Code  #Dispo: from home, acute 30-year-old male past medical history of asthma, daily marijuana use, prior ETOH abuse presents to the ER with c/o nausea and vomiting x 2 days. Was in this ER yesterday for same symptoms. Reports symptoms began after going to a party and drinking alcohol and eating food. States since arriving home he has been unable to tolerate liquids without vomiting. Has generalized abdominal pain and cold sweats. Denies fever, sick contacts, diarrhea, or other complaints.    #Sepsis POA  #Severe Colitis w/ inability to tolerate PO  #Elevated Lactate - Improving  - Vitals: Temp 98.8F, /80, , RR 18, SpO2 96% on RA  - Labs: WBC 15.72, AG 20, VBG lactate 6.0>>2.5  - CT A/P w/ IV contrast shows:  1. No bowel obstruction  2. Post appendectomy.  3. Circumferential wall thickening of the sigmoid colon compatible with colitis as well as element of underdistention  - s/p 3L IVF bolus and Cipro and Flagyl IV in the ED  - c/w Cipro and Flagyl IV  - start probiotics  - start gentle IVF  - advance diet as tolerate  - trend lactate    #KRISTY - likely pre-renal  - Cr 1.1 (baseline 0.8)  - s/p 3L IVF bolus and on gentle IVF  - monitor Cr    #Transaminitis  - AST 49, ALT 42  - CT A/P unremarkable for hepatobiliary pathology  - likely elevated in acute illness  - continue to monitor    #Asthma - not in exacerbation  - c/w home albuterol PRN    #Marijuana Use / Active Smoker  #ETOH abuse history  - c/w home thiamine and folic acid    #DVT ppx: Lovenox SubQ  #GI ppx: n/a  #Diet: advance as tolerate - regular  #Activity: AAT  #Code Status: Full Code  #Dispo: from home, acute

## 2025-05-28 NOTE — ED PROVIDER NOTE - PHYSICAL EXAMINATION
VITAL SIGNS: I have reviewed nursing notes and confirm.  CONSTITUTIONAL: Patient is in no acute distress.  SKIN: Skin exam is warm and dry, no acute rash.  HEAD: Normocephalic; atraumatic.  EYES: EOM intact; conjunctiva and sclera clear.  ENT: No nasal discharge  CARD: S1, S2 normal; Regular rate and rhythm.  RESP: Clear to auscultation bilaterally. No wheezes, rales or rhonchi.  ABD: soft; non-distended; +diffuse abdominal TTP.  NEURO: Alert, oriented. Grossly unremarkable. No focal deficits.  PSYCH: Cooperative, appropriate.

## 2025-05-28 NOTE — H&P ADULT - NSHPPHYSICALEXAM_GEN_ALL_CORE
VITALS:   Vital Signs Last 24 Hrs  T(C): 37.1 (28 May 2025 12:39), Max: 37.1 (28 May 2025 12:39)  T(F): 98.8 (28 May 2025 12:39), Max: 98.8 (28 May 2025 12:39)  HR: 100 (28 May 2025 12:39) (100 - 100)  BP: 123/80 (28 May 2025 12:39) (123/80 - 123/80)  RR: 18 (28 May 2025 12:39) (18 - 18)  SpO2: 96% (28 May 2025 12:39) (96% - 96%)    Parameters below as of 28 May 2025 12:39  Patient On (Oxygen Delivery Method): room air    CAPILLARY BLOOD GLUCOSE    POCT Blood Glucose.: 150 mg/dL (28 May 2025 12:44)      PHYSICAL EXAM:  General: in moderate distress 2/2 abd pain  HEENT: PERRLA, EOMI, moist mucous membranes  Neurology: A&Ox3, nonfocal, HARRIS x 4  Respiratory: CTA B/L, normal respiratory effort, no wheezes, crackles, rales  CV: RRR, S1S2, no murmurs, rubs or gallops  Abdominal: mildly tender to palpation, Soft, ND, +BS, no guarding or rebound  Extremities: No edema, + peripheral pulses

## 2025-05-28 NOTE — ED PROVIDER NOTE - CLINICAL SUMMARY MEDICAL DECISION MAKING FREE TEXT BOX
Pt with intractable n/v and abd pain with ct read of colitis, started on abx and will admit to med.  Any ordered labs and EKG were reviewed, Dr. Doreen Kulkarni, attending physician.  Any imaging was ordered and reviewed by me, Dr. Doreen Kulkarni, attending physician.  Appropriate medications for patient's presenting complaints were ordered and effects were reassessed.  Patient's records, if available,  (prior hospital, ED visit, and/or nursing home notes if available) were reviewed.  Additional history was obtained from EMS, family, and/or PCP (when available).  Escalation to admission/observation was considered.   Patient requires inpatient hospitalization - monitored setting.

## 2025-05-28 NOTE — H&P ADULT - NSHPLABSRESULTS_GEN_ALL_CORE
LABS:  cret                        16.6   15.72 )-----------( 327      ( 28 May 2025 13:18 )             45.4     05-28    137  |  99  |  18  ----------------------------<  132[H]  4.0   |  18  |  1.1    Ca    10.0      28 May 2025 13:18    TPro  7.6  /  Alb  4.5  /  TBili  1.5[H]  /  DBili  x   /  AST  49[H]  /  ALT  42[H]  /  AlkPhos  92  05-28

## 2025-05-28 NOTE — ED PROVIDER NOTE - DIFFERENTIAL DIAGNOSIS
Abdominal pain, N/V r/o UTI/pyelo, kidney stone, obstruction, perforation, pancreatitis, abscess, appendicitis, ischemia, hepatobiliary abnormality or any other emergent intra-abdominal pathology. Differential Diagnosis

## 2025-05-28 NOTE — H&P ADULT - HISTORY OF PRESENT ILLNESS
30-year-old male past medical history of asthma, daily marijuana use, prior ETOH abuse presents to the ER with c/o nausea and vomiting x 2 days. Was in this ER yesterday for same symptoms. Reports symptoms began after going to a party and drinking alcohol and eating food. States since arriving home he has been unable to tolerate liquids without vomiting. Has generalized abdominal pain and cold sweats. Denies fever, sick contacts, diarrhea, or other complaints.    Vitals: Temp 98.8F, /80, , RR 18, SpO2 96% on RA    Labs: WBC 15.72, AG 20, Cr 1.1 (baseline 0.8), AST 49, ALT 42, VBG lactate 6.0>>2.5    Imaging:  CT A/P w/ IV contrast shows:  - No bowel obstruction. Post appendectomy.  - Circumferential wall thickening of the sigmoid colon compatible with colitis as well as element of underdistention    In the ED:  - s/p 3L IVF bolus  - s/p Cipro and Flagyl  - s/p Morphine and Zofran    Admitted to medicine for inability to tolerate PO, severe abd and sepsis 2/2 severe colitis.

## 2025-05-28 NOTE — H&P ADULT - ATTENDING COMMENTS
HPI:  30-year-old male past medical history of asthma, daily marijuana use, prior ETOH abuse presents to the ER with c/o nausea and vomiting x 2 days. Was in this ER yesterday for same symptoms. Reports symptoms began after going to a party and drinking alcohol and eating food. States since arriving home he has been unable to tolerate liquids without vomiting. Has generalized abdominal pain and cold sweats. Denies fever, sick contacts, diarrhea, or other complaints.    Vitals: Temp 98.8F, /80, , RR 18, SpO2 96% on RA    Labs: WBC 15.72, AG 20, Cr 1.1 (baseline 0.8), AST 49, ALT 42, VBG lactate 6.0>>2.5    Imaging:  CT A/P w/ IV contrast shows:  - No bowel obstruction. Post appendectomy.  - Circumferential wall thickening of the sigmoid colon compatible with colitis as well as element of underdistention    In the ED:  - s/p 3L IVF bolus  - s/p Cipro and Flagyl  - s/p Morphine and Zofran    Admitted to medicine for inability to tolerate PO, severe abd and sepsis 2/2 severe colitis. (28 May 2025 19:10)    Physical Exam:  General: WN/WD NAD  Neurology: A&Ox3, nonfocal, follows commands  Eyes: PERRLA/ EOMI  ENT/Neck: Neck supple, trachea midline, No JVD  Respiratory: CTA B/L, No wheezing, rales, rhonchi  CV: Normal rate regular rhythm, S1S2, no murmurs, rubs or gallops  Abdominal: Soft, NT, ND +BS,   Extremities: No edema, + peripheral pulses  Skin: No Rashes, Hematoma, Ecchymosis    A/p   Sepsis / acute sigmoid colon colitis     Asthma     Alcohol use disorder     Marijuana use HPI:  30-year-old male past medical history of asthma, daily marijuana use, prior ETOH abuse presents to the ER with c/o nausea and vomiting x 2 days. Was in this ER yesterday for same symptoms. Reports symptoms began after going to a party and drinking alcohol and eating food. States since arriving home he has been unable to tolerate liquids without vomiting. Has generalized abdominal pain and cold sweats. Denies fever, sick contacts, diarrhea, or other complaints.    Vitals: Temp 98.8F, /80, , RR 18, SpO2 96% on RA    Labs: WBC 15.72, AG 20, Cr 1.1 (baseline 0.8), AST 49, ALT 42, VBG lactate 6.0>>2.5    Imaging:  CT A/P w/ IV contrast shows:  - No bowel obstruction. Post appendectomy.  - Circumferential wall thickening of the sigmoid colon compatible with colitis as well as element of underdistention    In the ED:  - s/p 3L IVF bolus  - s/p Cipro and Flagyl  - s/p Morphine and Zofran    Admitted to medicine for inability to tolerate PO, severe abd and sepsis 2/2 severe colitis. (28 May 2025 19:10)    Physical Exam:  General: WN/WD NAD  Neurology: A&Ox3, nonfocal, follows commands  Eyes: PERRLA/ EOMI  ENT/Neck: Neck supple, trachea midline, No JVD  Respiratory: CTA B/L, No wheezing, rales, rhonchi  CV: Normal rate regular rhythm, S1S2, no murmurs, rubs or gallops  Abdominal: Soft, NT, ND +BS,   Extremities: No edema, + peripheral pulses  Skin: No Rashes, Hematoma, Ecchymosis    A/p   Sepsis / acute sigmoid colon colitis  HAGMA - 2/2 diarrhea and sepsis   -IV Abx   -PRN pain and PRN fever control   -PRN pain and fever control    -repeat WBC     Asthma   -PRN Albuterol     H/o Alcohol use disorder - no use in several months   Marijuana use  -observation     PATIENT SEE by ATTENDING 5/28/25

## 2025-05-28 NOTE — ED PROVIDER NOTE - ATTENDING APP SHARED VISIT CONTRIBUTION OF CARE
30 year old male pmhx as documented including marijuana use presenting with several days of N/V, abdominal pain, difficulty tolerating PO. Patient seen in the ED several days ago at which time patient was treated with improvement and labs were unremarkable - patient discharged at that time. States his symptoms have worsened so he came back for evaluation. States pain is diffuse, sharp, non-radiating, no palliative or provocative factors. Otherwise denies fevers, chest pain, dyspnea, diarrhea, blood in stool, urinary symptoms or any other complaints. Last BM last night and (+) flatus per patient.    Vital Signs: I have reviewed the initial vital signs.  Constitutional: NAD, well-nourished, appears stated age, no acute distress.  HEENT: Airway patent, moist MM, no erythema/swelling/deformity of oral structures. EOMI, PERRLA.  CV: regular rate, regular rhythm, well-perfused extremities, 2+ b/l DP and radial pulses equal.  Lungs: BCTA, no increased WOB.  ABD: (+) diffuse tenderness, no guarding or rebound, no pulsatile mass, no hernias.   MSK: Neck supple, nontender, nl ROM, no stepoff. Chest nontender. Back nontender in TLS spine or to b/l bony structures or flanks. Ext nontender, nl rom, no deformity.   INTEG: Skin warm, dry, no rash.  NEURO: A&Ox3, normal strength, nl sensation throughout, normal speech.   PSYCH: Calm, cooperative, normal affect and interaction.    Will obtain labs, CT abd/pelvis, UA, symptomatic control, re-eval.

## 2025-05-28 NOTE — ED PROVIDER NOTE - PROGRESS NOTE DETAILS
NICOLLE: Patient initially requesting to trial PO but s/p intake of water patient vomited. States he does not want to be admitted currently but is requesting more nausea medication. More zofran and IVF ordered. Case endorsed to Dr. Kulkarni pending repeat lactate, further symptomatic control, re-eval, dispo.

## 2025-05-29 LAB
ALBUMIN SERPL ELPH-MCNC: 4.2 G/DL — SIGNIFICANT CHANGE UP (ref 3.5–5.2)
ALP SERPL-CCNC: 83 U/L — SIGNIFICANT CHANGE UP (ref 30–115)
ALT FLD-CCNC: 35 U/L — SIGNIFICANT CHANGE UP (ref 0–41)
ANION GAP SERPL CALC-SCNC: 16 MMOL/L — HIGH (ref 7–14)
AST SERPL-CCNC: 39 U/L — SIGNIFICANT CHANGE UP (ref 0–41)
BASOPHILS # BLD AUTO: 0.03 K/UL — SIGNIFICANT CHANGE UP (ref 0–0.2)
BASOPHILS NFR BLD AUTO: 0.2 % — SIGNIFICANT CHANGE UP (ref 0–1)
BILIRUB SERPL-MCNC: 1.4 MG/DL — HIGH (ref 0.2–1.2)
BUN SERPL-MCNC: 11 MG/DL — SIGNIFICANT CHANGE UP (ref 10–20)
CALCIUM SERPL-MCNC: 9.1 MG/DL — SIGNIFICANT CHANGE UP (ref 8.4–10.5)
CHLORIDE SERPL-SCNC: 99 MMOL/L — SIGNIFICANT CHANGE UP (ref 98–110)
CO2 SERPL-SCNC: 21 MMOL/L — SIGNIFICANT CHANGE UP (ref 17–32)
CREAT SERPL-MCNC: 0.8 MG/DL — SIGNIFICANT CHANGE UP (ref 0.7–1.5)
EGFR: 124 ML/MIN/1.73M2 — SIGNIFICANT CHANGE UP
EGFR: 124 ML/MIN/1.73M2 — SIGNIFICANT CHANGE UP
EOSINOPHIL # BLD AUTO: 0.01 K/UL — SIGNIFICANT CHANGE UP (ref 0–0.7)
EOSINOPHIL NFR BLD AUTO: 0.1 % — SIGNIFICANT CHANGE UP (ref 0–8)
GLUCOSE SERPL-MCNC: 112 MG/DL — HIGH (ref 70–99)
HCT VFR BLD CALC: 41 % — LOW (ref 42–52)
HGB BLD-MCNC: 14.7 G/DL — SIGNIFICANT CHANGE UP (ref 14–18)
IMM GRANULOCYTES NFR BLD AUTO: 0.3 % — SIGNIFICANT CHANGE UP (ref 0.1–0.3)
LACTATE SERPL-SCNC: 1.4 MMOL/L — SIGNIFICANT CHANGE UP (ref 0.7–2)
LYMPHOCYTES # BLD AUTO: 1.64 K/UL — SIGNIFICANT CHANGE UP (ref 1.2–3.4)
LYMPHOCYTES # BLD AUTO: 12.3 % — LOW (ref 20.5–51.1)
MAGNESIUM SERPL-MCNC: 1.9 MG/DL — SIGNIFICANT CHANGE UP (ref 1.8–2.4)
MCHC RBC-ENTMCNC: 30.4 PG — SIGNIFICANT CHANGE UP (ref 27–31)
MCHC RBC-ENTMCNC: 35.9 G/DL — SIGNIFICANT CHANGE UP (ref 32–37)
MCV RBC AUTO: 84.7 FL — SIGNIFICANT CHANGE UP (ref 80–94)
MONOCYTES # BLD AUTO: 1.4 K/UL — HIGH (ref 0.1–0.6)
MONOCYTES NFR BLD AUTO: 10.5 % — HIGH (ref 1.7–9.3)
NEUTROPHILS # BLD AUTO: 10.23 K/UL — HIGH (ref 1.4–6.5)
NEUTROPHILS NFR BLD AUTO: 76.6 % — HIGH (ref 42.2–75.2)
NRBC BLD AUTO-RTO: 0 /100 WBCS — SIGNIFICANT CHANGE UP (ref 0–0)
PLATELET # BLD AUTO: 298 K/UL — SIGNIFICANT CHANGE UP (ref 130–400)
PMV BLD: 9.8 FL — SIGNIFICANT CHANGE UP (ref 7.4–10.4)
POTASSIUM SERPL-MCNC: 3.6 MMOL/L — SIGNIFICANT CHANGE UP (ref 3.5–5)
POTASSIUM SERPL-SCNC: 3.6 MMOL/L — SIGNIFICANT CHANGE UP (ref 3.5–5)
PROT SERPL-MCNC: 6.3 G/DL — SIGNIFICANT CHANGE UP (ref 6–8)
RBC # BLD: 4.84 M/UL — SIGNIFICANT CHANGE UP (ref 4.7–6.1)
RBC # FLD: 12.9 % — SIGNIFICANT CHANGE UP (ref 11.5–14.5)
SODIUM SERPL-SCNC: 136 MMOL/L — SIGNIFICANT CHANGE UP (ref 135–146)
WBC # BLD: 13.35 K/UL — HIGH (ref 4.8–10.8)
WBC # FLD AUTO: 13.35 K/UL — HIGH (ref 4.8–10.8)

## 2025-05-29 PROCEDURE — 99233 SBSQ HOSP IP/OBS HIGH 50: CPT

## 2025-05-29 RX ORDER — SODIUM CHLORIDE 9 G/1000ML
1000 INJECTION, SOLUTION INTRAVENOUS
Refills: 0 | Status: DISCONTINUED | OUTPATIENT
Start: 2025-05-29 | End: 2025-05-30

## 2025-05-29 RX ORDER — MELATONIN 5 MG
3 TABLET ORAL AT BEDTIME
Refills: 0 | Status: DISCONTINUED | OUTPATIENT
Start: 2025-05-29 | End: 2025-05-30

## 2025-05-29 RX ORDER — HYDROXYZINE HYDROCHLORIDE 25 MG/1
25 TABLET, FILM COATED ORAL ONCE
Refills: 0 | Status: COMPLETED | OUTPATIENT
Start: 2025-05-29 | End: 2025-05-29

## 2025-05-29 RX ORDER — LORAZEPAM 4 MG/ML
2 VIAL (ML) INJECTION
Refills: 0 | Status: DISCONTINUED | OUTPATIENT
Start: 2025-05-29 | End: 2025-05-30

## 2025-05-29 RX ADMIN — Medication 4 MILLIGRAM(S): at 12:08

## 2025-05-29 RX ADMIN — ENOXAPARIN SODIUM 40 MILLIGRAM(S): 100 INJECTION SUBCUTANEOUS at 22:59

## 2025-05-29 RX ADMIN — Medication 2 MILLIGRAM(S): at 13:22

## 2025-05-29 RX ADMIN — Medication 75 MILLILITER(S): at 13:53

## 2025-05-29 RX ADMIN — FOLIC ACID 1 MILLIGRAM(S): 1 TABLET ORAL at 12:08

## 2025-05-29 RX ADMIN — Medication 100 MILLIGRAM(S): at 16:56

## 2025-05-29 RX ADMIN — Medication 1 TABLET(S): at 12:08

## 2025-05-29 RX ADMIN — HYDROXYZINE HYDROCHLORIDE 25 MILLIGRAM(S): 25 TABLET, FILM COATED ORAL at 01:28

## 2025-05-29 RX ADMIN — SODIUM CHLORIDE 100 MILLILITER(S): 9 INJECTION, SOLUTION INTRAVENOUS at 23:01

## 2025-05-29 RX ADMIN — Medication 3 MILLIGRAM(S): at 22:58

## 2025-05-29 RX ADMIN — Medication 100 MILLIGRAM(S): at 12:08

## 2025-05-29 RX ADMIN — Medication 100 MILLIGRAM(S): at 12:07

## 2025-05-29 RX ADMIN — Medication 200 MILLIGRAM(S): at 18:23

## 2025-05-29 RX ADMIN — Medication 200 MILLIGRAM(S): at 06:01

## 2025-05-29 RX ADMIN — Medication 100 MILLIGRAM(S): at 01:01

## 2025-05-29 NOTE — PROGRESS NOTE ADULT - ASSESSMENT
30-year-old male past medical history of asthma, daily marijuana use, prior ETOH abuse presents to the ER with c/o nausea and vomiting x 2 days. Was in this ER yesterday for same symptoms. Reports symptoms began after going to a party and drinking alcohol and eating food. States since arriving home he has been unable to tolerate liquids without vomiting. Has generalized abdominal pain and cold sweats. Denies fever, sick contacts, diarrhea, or other complaints.    #Sepsis POA  #Severe Colitis w/ inability to tolerate PO  #Elevated Lactate - Improving  - Vitals: Temp 98.8F, /80, , RR 18, SpO2 96% on RA  - Labs: WBC 15.72, AG 20, VBG lactate 6.0>>2.5  - CT A/P w/ IV contrast shows:  1. No bowel obstruction  2. Post appendectomy.  3. Circumferential wall thickening of the sigmoid colon compatible with colitis as well as element of underdistention  - s/p 3L IVF bolus and Cipro and Flagyl IV in the ED  - c/w Cipro and Flagyl IV  - start probiotics  - start gentle IVF  - advance diet as tolerate  - lactate improved  - CIWA  - utox    #KRISTY - likely pre-renal  - Cr 1.1 (baseline 0.8)  - s/p 3L IVF bolus and on gentle IVF  - monitor Cr    #Transaminitis  - AST 49, ALT 42  - CT A/P unremarkable for hepatobiliary pathology  - likely elevated in acute illness  - continue to monitor    #Asthma - not in exacerbation  - c/w home albuterol PRN    #Marijuana Use / Active Smoker  #ETOH abuse history  - c/w home thiamine and folic acid    #MISC  #DVT ppx: Lovenox SubQ  #GI ppx: n/a  #Diet: advance as tolerate - regular  #Activity: AAT  #Code Status: Full Code  #Dispo: from home, acute   30-year-old male past medical history of asthma, daily marijuana use, prior ETOH abuse presents to the ER with c/o nausea and vomiting x 2 days. Was in this ER yesterday for same symptoms. Reports symptoms began after going to a party and drinking alcohol and eating food. States since arriving home he has been unable to tolerate liquids without vomiting. Has generalized abdominal pain and cold sweats. Denies fever, sick contacts, diarrhea, or other complaints.    #Sepsis POA  #Severe Colitis w/ inability to tolerate PO  #Elevated Lactate - Improving  - Vitals: Temp 98.8F, /80, , RR 18, SpO2 96% on RA  - Labs: WBC 15.72, AG 20, VBG lactate 6.0>>2.5  - CT A/P w/ IV contrast shows:  1. No bowel obstruction  2. Post appendectomy.  3. Circumferential wall thickening of the sigmoid colon compatible with colitis as well as element of underdistention  - s/p 3L IVF bolus and Cipro and Flagyl IV in the ED  - c/w Cipro and Flagyl IV  - cw probiotics  - cw gentle IVF  - advance diet as tolerate  - lactate improved  - f/u CATCH team consult  - JACLYN  - love    #KRISTY - likely pre-renal  - Cr 1.1 (baseline 0.8)  - s/p 3L IVF bolus and on gentle IVF  - monitor Cr    #Transaminitis  - AST 49, ALT 42  - CT A/P unremarkable for hepatobiliary pathology  - likely elevated in acute illness  - continue to monitor    #Asthma - not in exacerbation  - c/w home albuterol PRN    #Marijuana Use / Active Smoker  #ETOH abuse history  - c/w home thiamine and folic acid    #MISC  #DVT ppx: Lovenox SubQ  #GI ppx: n/a  #Diet: advance as tolerate - regular  #Activity: AAT  #Code Status: Full Code  #Dispo: from home, acute

## 2025-05-29 NOTE — PROGRESS NOTE ADULT - SUBJECTIVE AND OBJECTIVE BOX
SUBJECTIVE/OVERNIGHT EVENTS  Today is hospital day 1d. This morning patient was seen and examined at bedside, resting comfortably in bed. No acute or major events overnight. Pt said he did not have any episodes of vomiting overnight, feels like he could try and eat this morning. Pt c/o having the shakes in his arms and that he has not been able to sleep for two days. Pt says this has happened to him before where he cannot sleep for a few days.    MEDICATIONS  STANDING MEDICATIONS  ciprofloxacin   IVPB 400 milliGRAM(s) IV Intermittent every 12 hours  enoxaparin Injectable 40 milliGRAM(s) SubCutaneous every 24 hours  folic acid 1 milliGRAM(s) Oral daily  lactobacillus acidophilus 1 Tablet(s) Oral daily  metroNIDAZOLE  IVPB 500 milliGRAM(s) IV Intermittent every 8 hours  sodium chloride 0.9%. 1000 milliLiter(s) IV Continuous <Continuous>  thiamine 100 milliGRAM(s) Oral daily    PRN MEDICATIONS  acetaminophen     Tablet .. 975 milliGRAM(s) Oral every 8 hours PRN  albuterol    90 MICROgram(s) HFA Inhaler 2 Puff(s) Inhalation every 6 hours PRN  ondansetron Injectable 4 milliGRAM(s) IV Push every 6 hours PRN    VITALS  T(F): 97.8 (05-29-25 @ 07:43), Max: 98.8 (05-28-25 @ 12:39)  HR: 73 (05-29-25 @ 07:43) (61 - 100)  BP: 135/77 (05-29-25 @ 07:43) (123/80 - 135/77)  RR: 18 (05-29-25 @ 07:43) (18 - 18)  SpO2: 96% (05-29-25 @ 07:43) (96% - 96%)  POCT Blood Glucose.: 150 mg/dL (05-28-25 @ 12:44)    PHYSICAL EXAM  GENERAL: NAD, lying in bed comfortably  HEAD:  Atraumatic, normocephalic  EYES: EOMI, conjunctiva and sclera clear  NECK: Supple, no JVD  HEART: Regular rate and rhythm, no murmurs, rubs, or gallops  LUNGS: Unlabored respirations.  Clear to auscultation bilaterally, no crackles, wheezing, or rhonchi  ABDOMEN: Soft, nontender, nondistended, +BS  EXTREMITIES: 2+ peripheral pulses bilaterally. No clubbing, cyanosis, or edema  NERVOUS SYSTEM:  A&Ox3, no focal deficits   SKIN: No rashes or lesions    LABS             14.7   13.35 )-----------( 298      ( 05-29-25 @ 07:18 )             41.0     136  |  99  |  11  -------------------------<  112   05-29-25 @ 07:18  3.6  |  21  |  0.8    Ca      9.1     05-29-25 @ 07:18  Mg     1.9     05-29-25 @ 07:18    TPro  6.3  /  Alb  4.2  /  TBili  1.4  /  DBili  x   /  AST  39  /  ALT  35  /  AlkPhos  83  /  GGT  x     05-29-25 @ 07:18        Urinalysis Basic - ( 29 May 2025 07:18 )    Color: x / Appearance: x / SG: x / pH: x  Gluc: 112 mg/dL / Ketone: x  / Bili: x / Urobili: x   Blood: x / Protein: x / Nitrite: x   Leuk Esterase: x / RBC: x / WBC x   Sq Epi: x / Non Sq Epi: x / Bacteria: x

## 2025-05-29 NOTE — PATIENT PROFILE ADULT - FALL HARM RISK - HARM RISK INTERVENTIONS
Assistance with ambulation/Assistance OOB with selected safe patient handling equipment/Communicate Risk of Fall with Harm to all staff/Monitor for mental status changes/Monitor gait and stability/Reinforce activity limits and safety measures with patient and family/Tailored Fall Risk Interventions/Toileting schedule using arm’s reach rule for commode and bathroom/Use of alarms - bed, chair and/or voice tab/Visual Cue: Yellow wristband and red socks/Bed in lowest position, wheels locked, appropriate side rails in place/Call bell, personal items and telephone in reach/Instruct patient to call for assistance before getting out of bed or chair/Non-slip footwear when patient is out of bed/[place] to call system/Physically safe environment - no spills, clutter or unnecessary equipment/Purposeful Proactive Rounding/Room/bathroom lighting operational, light cord in reach

## 2025-05-29 NOTE — ED ADULT NURSE NOTE - CHIEF COMPLAINT QUOTE
BIBA from home for vomiting, abdominal pain  and tremors that started yesterday. AAOX4 In triage. Denies any alcohol intake and drug intake. Denies drinking daily. . Denies any chest pain and SOB

## 2025-05-29 NOTE — PROGRESS NOTE ADULT - ATTENDING COMMENTS
Interval history: Pt seen and examined at bedside. No cp or sob.  states that he has hx of withdrawals   Vital Signs (24 Hrs):  T(C): 36.8 (05-29-25 @ 13:52), Max: 36.9 (05-29-25 @ 00:13)  HR: 85 (05-29-25 @ 13:52) (61 - 85)  BP: 126/81 (05-29-25 @ 13:52) (126/81 - 135/77)  RR: 18 (05-29-25 @ 13:52) (18 - 18)  SpO2: 99% (05-29-25 @ 13:52) (96% - 99%)      PHYSICAL EXAM:  GENERAL: NAD, well-developed  HEAD:  Atraumatic, Normocephalic  EYES: EOMI, PERRLA, conjunctiva and sclera clear  NECK: Supple, No JVD  CHEST/LUNG: Clear to auscultation bilaterally; No wheeze  HEART: Regular rate and rhythm; No murmurs, rubs, or gallops  ABDOMEN: Soft, Nontender, Nondistended; Bowel sounds present  EXTREMITIES:  2+ Peripheral Pulses, No clubbing, cyanosis, or edema  PSYCH: AAOx3  NEUROLOGY: non-focal  SKIN: No rashes or lesions  Labs reviewed  Imaging reviewed independently and reviewed read  < from: CT Abdomen and Pelvis w/ IV Cont (05.28.25 @ 15:15) >    IMPRESSION:  No bowel obstruction. Post appendectomy. Circumferential wall thickening   of the sigmoid colon compatible with colitis as well as element of   underdistention    Plan  30-year-old male past medical history of asthma, daily marijuana use, prior ETOH abuse presents to the ER with c/o nausea and vomiting x 2 days. Was in this ER yesterday for same symptoms. Reports symptoms began after going to a party and drinking alcohol and eating food. States since arriving home he has been unable to tolerate liquids without vomiting. Has generalized abdominal pain and cold sweats. Denies fever, sick contacts, diarrhea, or other complaints.    #Sepsis POA 2/2 Severe Colitis w/ inability to tolerate PO  #Elevated Lactate - Improving  - Vitals: Temp 98.8F, /80, , RR 18, SpO2 96% on RA  - Labs: WBC 15.72, AG 20, VBG lactate 6.0>>2.5  - CT A/P w/ IV contrast shows:  1. No bowel obstruction  2. Post appendectomy.  3. Circumferential wall thickening of the sigmoid colon compatible with colitis as well as element of underdistention  - s/p 3L IVF bolus and Cipro and Flagyl IV in the ED  - c/w Cipro and Flagyl IV  - cw probiotics  - cw gentle IVF  - advance diet as tolerate  - lactate improved  - f/u CATCH team consult  - CIWA  - utox    #KRISTY - likely pre-renal  - Cr 1.1 (baseline 0.8)  - s/p 3L IVF bolus and on gentle IVF  - monitor Cr    #Transaminitis  - AST 49, ALT 42  - CT A/P unremarkable for hepatobiliary pathology  - likely elevated in acute illness  - continue to monitor    #Asthma - not in exacerbation  - c/w home albuterol PRN    #Marijuana Use / Active Smoker  #ETOH abuse history  - c/w home thiamine and folic acid  - CIWA ativan protocol prn   #Progress Note Handoff  Pending (specify):  follow up CIWA , clinical improvement , cultures   Family discussion: house staff updated pt family  Disposition: home 24-48hrs   Decision to admit the pt is based on acuity as above Interval history: Pt seen and examined at bedside. No cp or sob.  states that he has hx of withdrawals   Vital Signs (24 Hrs):  T(C): 36.8 (05-29-25 @ 13:52), Max: 36.9 (05-29-25 @ 00:13)  HR: 85 (05-29-25 @ 13:52) (61 - 85)  BP: 126/81 (05-29-25 @ 13:52) (126/81 - 135/77)  RR: 18 (05-29-25 @ 13:52) (18 - 18)  SpO2: 99% (05-29-25 @ 13:52) (96% - 99%)      PHYSICAL EXAM:  GENERAL: NAD, well-developed  HEAD:  Atraumatic, Normocephalic  EYES: EOMI, PERRLA, conjunctiva and sclera clear  NECK: Supple, No JVD  CHEST/LUNG: Clear to auscultation bilaterally; No wheeze  HEART: Regular rate and rhythm; No murmurs, rubs, or gallops  ABDOMEN: Soft, Nontender, Nondistended; Bowel sounds present  EXTREMITIES:  2+ Peripheral Pulses, No clubbing, cyanosis, or edema  PSYCH: AAOx3  NEUROLOGY: non-focal  SKIN: No rashes or lesions  Labs reviewed  Imaging reviewed independently and reviewed read  < from: CT Abdomen and Pelvis w/ IV Cont (05.28.25 @ 15:15) >    IMPRESSION:  No bowel obstruction. Post appendectomy. Circumferential wall thickening   of the sigmoid colon compatible with colitis as well as element of   underdistention    Plan  30-year-old male past medical history of asthma, daily marijuana use, prior ETOH abuse presents to the ER with c/o nausea and vomiting x 2 days. Was in this ER yesterday for same symptoms. Reports symptoms began after going to a party and drinking alcohol and eating food. States since arriving home he has been unable to tolerate liquids without vomiting. Has generalized abdominal pain and cold sweats. Denies fever, sick contacts, diarrhea, or other complaints.    #Severe Sepsis POA 2/2 Severe Colitis w/ inability to tolerate PO  #Elevated Lactate - Improving, wbc, source  - Vitals: Temp 98.8F, /80, , RR 18, SpO2 96% on RA  - Labs: WBC 15.72, AG 20, VBG lactate 6.0>>2.5  - CT A/P w/ IV contrast shows:  1. No bowel obstruction  2. Post appendectomy.  3. Circumferential wall thickening of the sigmoid colon compatible with colitis as well as element of underdistention  - s/p 3L IVF bolus and Cipro and Flagyl IV in the ED  - c/w Cipro and Flagyl IV  - cw probiotics  - cw gentle IVF  - advance diet as tolerate  - lactate improved  - f/u CATCH team consult  - CIWA  - utox    #KRISTY - likely pre-renal  - Cr 1.1 (baseline 0.8)  - s/p 3L IVF bolus and on gentle IVF  - monitor Cr    #Transaminitis  - AST 49, ALT 42  - CT A/P unremarkable for hepatobiliary pathology  - likely elevated in acute illness  - continue to monitor    #Asthma - not in exacerbation  - c/w home albuterol PRN    #Marijuana Use / Active Smoker  #ETOH abuse history  - c/w home thiamine and folic acid  - CIWA ativan protocol prn   #Progress Note Handoff  Pending (specify):  follow up CIWA , clinical improvement , cultures   Family discussion: house staff updated pt family  Disposition: home 24-48hrs   Decision to admit the pt is based on acuity as above

## 2025-05-30 ENCOUNTER — TRANSCRIPTION ENCOUNTER (OUTPATIENT)
Age: 30
End: 2025-05-30

## 2025-05-30 VITALS
HEART RATE: 65 BPM | SYSTOLIC BLOOD PRESSURE: 112 MMHG | DIASTOLIC BLOOD PRESSURE: 71 MMHG | OXYGEN SATURATION: 99 % | TEMPERATURE: 98 F | RESPIRATION RATE: 18 BRPM

## 2025-05-30 LAB
ALBUMIN SERPL ELPH-MCNC: 4 G/DL — SIGNIFICANT CHANGE UP (ref 3.5–5.2)
ALP SERPL-CCNC: 84 U/L — SIGNIFICANT CHANGE UP (ref 30–115)
ALT FLD-CCNC: 33 U/L — SIGNIFICANT CHANGE UP (ref 0–41)
ANION GAP SERPL CALC-SCNC: 15 MMOL/L — HIGH (ref 7–14)
AST SERPL-CCNC: 29 U/L — SIGNIFICANT CHANGE UP (ref 0–41)
BILIRUB SERPL-MCNC: 1.5 MG/DL — HIGH (ref 0.2–1.2)
BUN SERPL-MCNC: 10 MG/DL — SIGNIFICANT CHANGE UP (ref 10–20)
CALCIUM SERPL-MCNC: 8.9 MG/DL — SIGNIFICANT CHANGE UP (ref 8.4–10.5)
CHLORIDE SERPL-SCNC: 98 MMOL/L — SIGNIFICANT CHANGE UP (ref 98–110)
CO2 SERPL-SCNC: 20 MMOL/L — SIGNIFICANT CHANGE UP (ref 17–32)
CREAT SERPL-MCNC: 0.9 MG/DL — SIGNIFICANT CHANGE UP (ref 0.7–1.5)
EGFR: 119 ML/MIN/1.73M2 — SIGNIFICANT CHANGE UP
EGFR: 119 ML/MIN/1.73M2 — SIGNIFICANT CHANGE UP
GLUCOSE SERPL-MCNC: 101 MG/DL — HIGH (ref 70–99)
HCT VFR BLD CALC: 41.3 % — LOW (ref 42–52)
HGB BLD-MCNC: 14.9 G/DL — SIGNIFICANT CHANGE UP (ref 14–18)
MAGNESIUM SERPL-MCNC: 2.1 MG/DL — SIGNIFICANT CHANGE UP (ref 1.8–2.4)
MCHC RBC-ENTMCNC: 30.2 PG — SIGNIFICANT CHANGE UP (ref 27–31)
MCHC RBC-ENTMCNC: 36.1 G/DL — SIGNIFICANT CHANGE UP (ref 32–37)
MCV RBC AUTO: 83.6 FL — SIGNIFICANT CHANGE UP (ref 80–94)
NRBC BLD AUTO-RTO: 0 /100 WBCS — SIGNIFICANT CHANGE UP (ref 0–0)
PLATELET # BLD AUTO: 295 K/UL — SIGNIFICANT CHANGE UP (ref 130–400)
PMV BLD: 9.6 FL — SIGNIFICANT CHANGE UP (ref 7.4–10.4)
POTASSIUM SERPL-MCNC: 3.2 MMOL/L — LOW (ref 3.5–5)
POTASSIUM SERPL-SCNC: 3.2 MMOL/L — LOW (ref 3.5–5)
PROT SERPL-MCNC: 6.1 G/DL — SIGNIFICANT CHANGE UP (ref 6–8)
RBC # BLD: 4.94 M/UL — SIGNIFICANT CHANGE UP (ref 4.7–6.1)
RBC # FLD: 12.4 % — SIGNIFICANT CHANGE UP (ref 11.5–14.5)
SODIUM SERPL-SCNC: 133 MMOL/L — LOW (ref 135–146)
WBC # BLD: 11.13 K/UL — HIGH (ref 4.8–10.8)
WBC # FLD AUTO: 11.13 K/UL — HIGH (ref 4.8–10.8)

## 2025-05-30 PROCEDURE — 99233 SBSQ HOSP IP/OBS HIGH 50: CPT

## 2025-05-30 RX ORDER — CIPROFLOXACIN HCL 250 MG
1 TABLET ORAL
Qty: 10 | Refills: 0
Start: 2025-05-30 | End: 2025-06-03

## 2025-05-30 RX ORDER — LACTOBACILLUS ACIDOPHILUS/PECT 75 MM-100
1 CAPSULE ORAL
Qty: 7 | Refills: 0
Start: 2025-05-30 | End: 2025-06-05

## 2025-05-30 RX ORDER — METRONIDAZOLE 250 MG
1 TABLET ORAL
Qty: 15 | Refills: 0
Start: 2025-05-30 | End: 2025-06-03

## 2025-05-30 RX ADMIN — Medication 1 APPLICATION(S): at 09:59

## 2025-05-30 RX ADMIN — Medication 40 MILLIEQUIVALENT(S): at 08:46

## 2025-05-30 RX ADMIN — Medication 1 TABLET(S): at 11:38

## 2025-05-30 RX ADMIN — Medication 200 MILLIGRAM(S): at 06:04

## 2025-05-30 RX ADMIN — Medication 100 MILLIGRAM(S): at 08:47

## 2025-05-30 RX ADMIN — Medication 100 MILLIGRAM(S): at 01:44

## 2025-05-30 RX ADMIN — FOLIC ACID 1 MILLIGRAM(S): 1 TABLET ORAL at 11:38

## 2025-05-30 RX ADMIN — Medication 100 MILLIGRAM(S): at 11:38

## 2025-05-30 RX ADMIN — Medication 40 MILLIEQUIVALENT(S): at 10:00

## 2025-05-30 NOTE — DISCHARGE NOTE NURSING/CASE MANAGEMENT/SOCIAL WORK - NSDCVIVACCINE_GEN_ALL_CORE_FT
Tdap; 23-Feb-2025 07:51; Adrienne Saxena (TIAN); [employer]; [id number] (Exp. Date: 01-Aug-2026); IntraMuscular; Deltoid Right.; 0.5 milliLiter(s); VIS (VIS Published: 09-May-2013, VIS Presented: 23-Feb-2025);

## 2025-05-30 NOTE — DISCHARGE NOTE PROVIDER - HOSPITAL COURSE
This 30-year-old male with a history of asthma, daily marijuana use, and prior alcohol abuse presented with two days of nausea, vomiting, and abdominal pain following alcohol consumption and eating at a party. He was found to have severe sepsis secondary to colitis, evidenced by an elevated lactate (6.0), leukocytosis (WBC 15.72), and CT findings of sigmoid colon wall thickening. He also had mild KRISTY, presumed pre-renal, and transient transaminitis. He was treated with IV fluids, Ciprofloxacin, Flagyl, and supportive care. His lactate improved, he tolerated a regular diet, and his abdominal pain resolved. He also received a [facility] protocol for alcohol withdrawal and counseling from the CATCH team. He is being discharged on oral antibiotics with a plan for outpatient follow-up for substance use and mental health support.    Discussion of discharge plan of care, including discharge diagnoses, medication reconciliation, and follow-ups was conducted with Dr. Brown and discharge was approved.

## 2025-05-30 NOTE — DISCHARGE NOTE NURSING/CASE MANAGEMENT/SOCIAL WORK - PATIENT PORTAL LINK FT
You can access the FollowMyHealth Patient Portal offered by [facility] by registering at the following website: [email or web address]. By joining [facility]’s FollowMyHealth portal, you will also be able to view your health information using other applications (apps) compatible with our system.

## 2025-05-30 NOTE — DISCHARGE NOTE PROVIDER - CARE PROVIDER_API CALL
Tawanda Beck  Internal Medicine  [address] [place] [id number]  Phone: [phone]  Fax: [phone]  Follow Up Time: 2 weeks

## 2025-05-30 NOTE — DISCHARGE NOTE PROVIDER - DISCHARGE SERVICE FOR PATIENT
on the discharge service for the patient. I have reviewed and made amendments to the documentation where necessary. no chest pain and no edema.

## 2025-05-30 NOTE — DISCHARGE NOTE NURSING/CASE MANAGEMENT/SOCIAL WORK - FINANCIAL ASSISTANCE
[facility] provides services at a reduced cost to those who are determined to be eligible through [facility]’s financial assistance program. Information regarding [facility]’s financial assistance program can be found by going to [email or web address] or by calling [phone].

## 2025-05-30 NOTE — DISCHARGE NOTE PROVIDER - NSDCMRMEDTOKEN_GEN_ALL_CORE_FT
Albuterol (Eqv-ProAir HFA) 90 mcg/inh inhalation aerosol: 2 inhaled 4 times a day as needed for  bronchospasm  folic acid 1 mg oral tablet: 1 tab(s) orally once a day  thiamine 100 mg oral tablet: 1 tab(s) orally once a day   Albuterol (Eqv-ProAir HFA) 90 mcg/inh inhalation aerosol: 2 inhaled 4 times a day as needed for  bronchospasm  ciprofloxacin 500 mg oral tablet: 1 tab(s) orally 2 times a day  folic acid 1 mg oral tablet: 1 tab(s) orally once a day  lactobacillus acidophilus oral capsule: 1 cap(s) orally once a day  metroNIDAZOLE 500 mg oral tablet: 1 tab(s) orally 3 times a day  thiamine 100 mg oral tablet: 1 tab(s) orally once a day

## 2025-05-30 NOTE — DISCHARGE NOTE PROVIDER - NSDCCPCAREPLAN_GEN_ALL_CORE_FT
PRINCIPAL DISCHARGE DIAGNOSIS  Diagnosis: Acute colitis  Assessment and Plan of Treatment: You were found to have severe sepsis secondary to colitis, CT findings of sigmoid colon wall thickening. You were given IV fluids, Ciprofloxacin, Flagyl, and supportive care. You tolerated a regular diet, and your abdominal pain resolved. You are being discharged on oral antibiotics with a plan for outpatient follow-up for substance use and mental health support. Please continue taking your medications and follow up with your PCP outpatient.

## 2025-05-30 NOTE — DISCHARGE NOTE PROVIDER - ATTENDING DISCHARGE PHYSICAL EXAMINATION:
Attending attestation  Attending DC note  Pt seen and examined at bedside. No cp or sob. Improved   vitals, labs, exam stable  Hospital course as above.  Plan dw pt and agreed to plan  Medically cleared for DC. Med recc completed.  RIAZ resident. Spent 32 mins on case

## 2025-06-04 DIAGNOSIS — J45.909 UNSPECIFIED ASTHMA, UNCOMPLICATED: ICD-10-CM

## 2025-06-04 DIAGNOSIS — F10.139 ALCOHOL ABUSE WITH WITHDRAWAL, UNSPECIFIED: ICD-10-CM

## 2025-06-04 DIAGNOSIS — N17.9 ACUTE KIDNEY FAILURE, UNSPECIFIED: ICD-10-CM

## 2025-06-04 DIAGNOSIS — E87.20 ACIDOSIS, UNSPECIFIED: ICD-10-CM

## 2025-06-04 DIAGNOSIS — A41.9 SEPSIS, UNSPECIFIED ORGANISM: ICD-10-CM

## 2025-06-04 DIAGNOSIS — K52.9 NONINFECTIVE GASTROENTERITIS AND COLITIS, UNSPECIFIED: ICD-10-CM

## 2025-06-04 DIAGNOSIS — R65.20 SEVERE SEPSIS WITHOUT SEPTIC SHOCK: ICD-10-CM

## 2025-06-07 ENCOUNTER — INPATIENT (INPATIENT)
Facility: HOSPITAL | Age: 30
LOS: 0 days | Discharge: HOME | DRG: 249 | End: 2025-06-08
Attending: INTERNAL MEDICINE | Admitting: STUDENT IN AN ORGANIZED HEALTH CARE EDUCATION/TRAINING PROGRAM
Payer: MEDICAID

## 2025-06-07 VITALS
TEMPERATURE: 98 F | RESPIRATION RATE: 18 BRPM | OXYGEN SATURATION: 98 % | HEART RATE: 96 BPM | SYSTOLIC BLOOD PRESSURE: 130 MMHG | DIASTOLIC BLOOD PRESSURE: 82 MMHG | HEIGHT: 72 IN

## 2025-06-07 DIAGNOSIS — R11.2 NAUSEA WITH VOMITING, UNSPECIFIED: ICD-10-CM

## 2025-06-07 DIAGNOSIS — Z90.49 ACQUIRED ABSENCE OF OTHER SPECIFIED PARTS OF DIGESTIVE TRACT: Chronic | ICD-10-CM

## 2025-06-07 LAB
ALBUMIN SERPL ELPH-MCNC: 4.7 G/DL — SIGNIFICANT CHANGE UP (ref 3.5–5.2)
ALP SERPL-CCNC: 94 U/L — SIGNIFICANT CHANGE UP (ref 30–115)
ALT FLD-CCNC: 46 U/L — HIGH (ref 0–41)
ANION GAP SERPL CALC-SCNC: 23 MMOL/L — HIGH (ref 7–14)
APAP SERPL-MCNC: <5 UG/ML — LOW (ref 10–30)
AST SERPL-CCNC: 36 U/L — SIGNIFICANT CHANGE UP (ref 0–41)
BASE EXCESS BLDV CALC-SCNC: -4.5 MMOL/L — LOW (ref -2–3)
BASOPHILS # BLD AUTO: 0.08 K/UL — SIGNIFICANT CHANGE UP (ref 0–0.2)
BASOPHILS NFR BLD AUTO: 0.6 % — SIGNIFICANT CHANGE UP (ref 0–1)
BILIRUB SERPL-MCNC: 0.3 MG/DL — SIGNIFICANT CHANGE UP (ref 0.2–1.2)
BUN SERPL-MCNC: 11 MG/DL — SIGNIFICANT CHANGE UP (ref 10–20)
CA-I SERPL-SCNC: 1.13 MMOL/L — LOW (ref 1.15–1.33)
CALCIUM SERPL-MCNC: 9.7 MG/DL — SIGNIFICANT CHANGE UP (ref 8.4–10.5)
CHLORIDE SERPL-SCNC: 102 MMOL/L — SIGNIFICANT CHANGE UP (ref 98–110)
CO2 SERPL-SCNC: 14 MMOL/L — LOW (ref 17–32)
CREAT SERPL-MCNC: 1 MG/DL — SIGNIFICANT CHANGE UP (ref 0.7–1.5)
EGFR: 105 ML/MIN/1.73M2 — SIGNIFICANT CHANGE UP
EGFR: 105 ML/MIN/1.73M2 — SIGNIFICANT CHANGE UP
EOSINOPHIL # BLD AUTO: 0.03 K/UL — SIGNIFICANT CHANGE UP (ref 0–0.7)
EOSINOPHIL NFR BLD AUTO: 0.2 % — SIGNIFICANT CHANGE UP (ref 0–8)
ETHANOL SERPL-MCNC: <10 MG/DL — SIGNIFICANT CHANGE UP
GAS PNL BLDV: 132 MMOL/L — LOW (ref 136–145)
GAS PNL BLDV: SIGNIFICANT CHANGE UP
GLUCOSE SERPL-MCNC: 135 MG/DL — HIGH (ref 70–99)
HCO3 BLDV-SCNC: 16 MMOL/L — LOW (ref 22–29)
HCT VFR BLD CALC: 45 % — SIGNIFICANT CHANGE UP (ref 42–52)
HCT VFR BLDA CALC: 51 % — SIGNIFICANT CHANGE UP (ref 39–51)
HGB BLD CALC-MCNC: 16.9 G/DL — SIGNIFICANT CHANGE UP (ref 12.6–17.4)
HGB BLD-MCNC: 16.3 G/DL — SIGNIFICANT CHANGE UP (ref 14–18)
IMM GRANULOCYTES NFR BLD AUTO: 1.1 % — HIGH (ref 0.1–0.3)
LACTATE BLDV-MCNC: 7.6 MMOL/L — CRITICAL HIGH (ref 0.5–2)
LACTATE SERPL-SCNC: 2.3 MMOL/L — HIGH (ref 0.7–2)
LIDOCAIN IGE QN: 15 U/L — SIGNIFICANT CHANGE UP (ref 7–60)
LYMPHOCYTES # BLD AUTO: 1.84 K/UL — SIGNIFICANT CHANGE UP (ref 1.2–3.4)
LYMPHOCYTES # BLD AUTO: 13.4 % — LOW (ref 20.5–51.1)
MCHC RBC-ENTMCNC: 30.7 PG — SIGNIFICANT CHANGE UP (ref 27–31)
MCHC RBC-ENTMCNC: 36.2 G/DL — SIGNIFICANT CHANGE UP (ref 32–37)
MCV RBC AUTO: 84.7 FL — SIGNIFICANT CHANGE UP (ref 80–94)
MONOCYTES # BLD AUTO: 0.7 K/UL — HIGH (ref 0.1–0.6)
MONOCYTES NFR BLD AUTO: 5.1 % — SIGNIFICANT CHANGE UP (ref 1.7–9.3)
NEUTROPHILS # BLD AUTO: 10.9 K/UL — HIGH (ref 1.4–6.5)
NEUTROPHILS NFR BLD AUTO: 79.6 % — HIGH (ref 42.2–75.2)
NRBC BLD AUTO-RTO: 0 /100 WBCS — SIGNIFICANT CHANGE UP (ref 0–0)
PCO2 BLDV: 21 MMHG — LOW (ref 42–55)
PH BLDV: 7.49 — HIGH (ref 7.32–7.43)
PLATELET # BLD AUTO: 379 K/UL — SIGNIFICANT CHANGE UP (ref 130–400)
PMV BLD: 9.9 FL — SIGNIFICANT CHANGE UP (ref 7.4–10.4)
PO2 BLDV: 101 MMHG — HIGH (ref 25–45)
POTASSIUM BLDV-SCNC: 3.7 MMOL/L — SIGNIFICANT CHANGE UP (ref 3.5–5.1)
POTASSIUM SERPL-MCNC: 4.2 MMOL/L — SIGNIFICANT CHANGE UP (ref 3.5–5)
POTASSIUM SERPL-SCNC: 4.2 MMOL/L — SIGNIFICANT CHANGE UP (ref 3.5–5)
PROT SERPL-MCNC: 7.3 G/DL — SIGNIFICANT CHANGE UP (ref 6–8)
RBC # BLD: 5.31 M/UL — SIGNIFICANT CHANGE UP (ref 4.7–6.1)
RBC # FLD: 13.9 % — SIGNIFICANT CHANGE UP (ref 11.5–14.5)
SALICYLATES SERPL-MCNC: <0.3 MG/DL — LOW (ref 4–30)
SAO2 % BLDV: 99.6 % — HIGH (ref 67–88)
SODIUM SERPL-SCNC: 139 MMOL/L — SIGNIFICANT CHANGE UP (ref 135–146)
WBC # BLD: 13.7 K/UL — HIGH (ref 4.8–10.8)
WBC # FLD AUTO: 13.7 K/UL — HIGH (ref 4.8–10.8)

## 2025-06-07 PROCEDURE — 83735 ASSAY OF MAGNESIUM: CPT

## 2025-06-07 PROCEDURE — 36415 COLL VENOUS BLD VENIPUNCTURE: CPT

## 2025-06-07 PROCEDURE — 74177 CT ABD & PELVIS W/CONTRAST: CPT | Mod: 26

## 2025-06-07 PROCEDURE — 83605 ASSAY OF LACTIC ACID: CPT

## 2025-06-07 PROCEDURE — 99223 1ST HOSP IP/OBS HIGH 75: CPT

## 2025-06-07 PROCEDURE — 93010 ELECTROCARDIOGRAM REPORT: CPT

## 2025-06-07 PROCEDURE — 80053 COMPREHEN METABOLIC PANEL: CPT

## 2025-06-07 PROCEDURE — 99285 EMERGENCY DEPT VISIT HI MDM: CPT

## 2025-06-07 PROCEDURE — 71045 X-RAY EXAM CHEST 1 VIEW: CPT | Mod: 26

## 2025-06-07 PROCEDURE — 85025 COMPLETE CBC W/AUTO DIFF WBC: CPT

## 2025-06-07 RX ORDER — CIPROFLOXACIN HCL 250 MG
400 TABLET ORAL ONCE
Refills: 0 | Status: COMPLETED | OUTPATIENT
Start: 2025-06-07 | End: 2025-06-07

## 2025-06-07 RX ORDER — CIPROFLOXACIN HCL 250 MG
TABLET ORAL
Refills: 0 | Status: DISCONTINUED | OUTPATIENT
Start: 2025-06-07 | End: 2025-06-08

## 2025-06-07 RX ORDER — ENOXAPARIN SODIUM 100 MG/ML
40 INJECTION SUBCUTANEOUS EVERY 24 HOURS
Refills: 0 | Status: DISCONTINUED | OUTPATIENT
Start: 2025-06-07 | End: 2025-06-08

## 2025-06-07 RX ORDER — SODIUM CHLORIDE 9 G/1000ML
1000 INJECTION, SOLUTION INTRAVENOUS
Refills: 0 | Status: DISCONTINUED | OUTPATIENT
Start: 2025-06-07 | End: 2025-06-08

## 2025-06-07 RX ORDER — METRONIDAZOLE 250 MG
500 TABLET ORAL ONCE
Refills: 0 | Status: COMPLETED | OUTPATIENT
Start: 2025-06-07 | End: 2025-06-07

## 2025-06-07 RX ORDER — FOLIC ACID 1 MG/1
1 TABLET ORAL DAILY
Refills: 0 | Status: DISCONTINUED | OUTPATIENT
Start: 2025-06-07 | End: 2025-06-08

## 2025-06-07 RX ORDER — KETOROLAC TROMETHAMINE 30 MG/ML
15 INJECTION, SOLUTION INTRAMUSCULAR; INTRAVENOUS ONCE
Refills: 0 | Status: DISCONTINUED | OUTPATIENT
Start: 2025-06-07 | End: 2025-06-07

## 2025-06-07 RX ORDER — METRONIDAZOLE 250 MG
500 TABLET ORAL EVERY 8 HOURS
Refills: 0 | Status: DISCONTINUED | OUTPATIENT
Start: 2025-06-08 | End: 2025-06-08

## 2025-06-07 RX ORDER — ONDANSETRON HCL/PF 4 MG/2 ML
4 VIAL (ML) INJECTION ONCE
Refills: 0 | Status: COMPLETED | OUTPATIENT
Start: 2025-06-07 | End: 2025-06-07

## 2025-06-07 RX ORDER — ONDANSETRON HCL/PF 4 MG/2 ML
4 VIAL (ML) INJECTION EVERY 6 HOURS
Refills: 0 | Status: DISCONTINUED | OUTPATIENT
Start: 2025-06-07 | End: 2025-06-08

## 2025-06-07 RX ORDER — SODIUM CHLORIDE 9 G/1000ML
2000 INJECTION, SOLUTION INTRAVENOUS ONCE
Refills: 0 | Status: COMPLETED | OUTPATIENT
Start: 2025-06-07 | End: 2025-06-07

## 2025-06-07 RX ORDER — ALBUTEROL SULFATE 2.5 MG/3ML
2 VIAL, NEBULIZER (ML) INHALATION EVERY 6 HOURS
Refills: 0 | Status: DISCONTINUED | OUTPATIENT
Start: 2025-06-07 | End: 2025-06-08

## 2025-06-07 RX ORDER — LACTOBACILLUS ACIDOPHILUS/PECT 75 MM-100
1 CAPSULE ORAL DAILY
Refills: 0 | Status: DISCONTINUED | OUTPATIENT
Start: 2025-06-07 | End: 2025-06-08

## 2025-06-07 RX ORDER — CIPROFLOXACIN HCL 250 MG
400 TABLET ORAL EVERY 12 HOURS
Refills: 0 | Status: DISCONTINUED | OUTPATIENT
Start: 2025-06-08 | End: 2025-06-08

## 2025-06-07 RX ORDER — HALOPERIDOL 10 MG/1
5 TABLET ORAL ONCE
Refills: 0 | Status: COMPLETED | OUTPATIENT
Start: 2025-06-07 | End: 2025-06-07

## 2025-06-07 RX ORDER — METRONIDAZOLE 250 MG
TABLET ORAL
Refills: 0 | Status: DISCONTINUED | OUTPATIENT
Start: 2025-06-07 | End: 2025-06-08

## 2025-06-07 RX ADMIN — HALOPERIDOL 5 MILLIGRAM(S): 10 TABLET ORAL at 09:40

## 2025-06-07 RX ADMIN — KETOROLAC TROMETHAMINE 15 MILLIGRAM(S): 30 INJECTION, SOLUTION INTRAMUSCULAR; INTRAVENOUS at 09:41

## 2025-06-07 RX ADMIN — SODIUM CHLORIDE 2000 MILLILITER(S): 9 INJECTION, SOLUTION INTRAVENOUS at 09:39

## 2025-06-07 RX ADMIN — SODIUM CHLORIDE 75 MILLILITER(S): 9 INJECTION, SOLUTION INTRAVENOUS at 18:39

## 2025-06-07 RX ADMIN — Medication 200 MILLIGRAM(S): at 18:39

## 2025-06-07 RX ADMIN — Medication 4 MILLIGRAM(S): at 09:41

## 2025-06-07 RX ADMIN — Medication 20 MILLIGRAM(S): at 09:41

## 2025-06-07 RX ADMIN — Medication 100 MILLIGRAM(S): at 18:39

## 2025-06-07 NOTE — H&P ADULT - NSHPPHYSICALEXAM_GEN_ALL_CORE
CONSTITUTIONAL: Well-developed; well-nourished; in no acute distress  HEAD: Normocephalic; atraumatic  EYES: PERRL, EOM intact; conjunctiva and sclera clear  ENT: No nasal discharge;, oropharynx clear without tonsillar hypertrophy or exudates  NECK: Supple; non tender.  CARD: S1, S2 normal; no murmurs, gallops, or rubs. Regular rate and rhythm  RESP: Clear b/l , no wheezes, rales or rhonchi  ABD: Normal bowel sounds; soft; diffuse tenderness ,not distended    EXT: No edema. Distal pulses intact  NEURO: Awake and alert, oriented. Grossly unremarkable. No focal deficits.  SKIN: Skin exam is warm and dry, diaphoretic

## 2025-06-07 NOTE — ED PROVIDER NOTE - PROGRESS NOTE DETAILS
sh  results reviewed  pt still feeling unable to tolerate po   endorses mild abdominal pain  will admit

## 2025-06-07 NOTE — ED PROVIDER NOTE - OBJECTIVE STATEMENT
30-year-old male past medical history of asthma Daily marijuana use prior ethanol abuse presenting to the ED with chief complaint of nausea and vomiting x 2 days.  Patient was recently seen in the ED on May 28 found to have severe sepsis secondary to colitis and mild KRISTY and transient transaminitis treated with antibiotics and IV fluids and discharge on oral antibiotics.  Patient denies any recent URI symptoms shortness of breath chest pain fevers chills trauma acute rash edema.  Patient states he did not take any alcohol for the past few days.  Denies any tremors hallucinations or history of alcohol withdrawal.

## 2025-06-07 NOTE — ED PROVIDER NOTE - DIFFERENTIAL DIAGNOSIS
N/V, abd pain r/o UTI/pyelo, kidney stone, obstruction, perforation, pancreatitis, abscess, appendicitis, ischemia, hepatobiliary abnormality or any other emergent intra-abdominal pathology. Differential Diagnosis

## 2025-06-07 NOTE — ED PROVIDER NOTE - PHYSICAL EXAMINATION
VITAL SIGNS: noted  CONSTITUTIONAL: Well-developed; well-nourished; in no acute distress, pale appearing  HEAD: Normocephalic; atraumatic  EYES: PERRL, EOM intact; conjunctiva and sclera clear  ENT: No nasal discharge;, MMM, oropharynx clear without tonsillar hypertrophy or exudates  NECK: Supple; non tender. No anterior cervical lymphadenopathy noted  CARD: S1, S2 normal; no murmurs, gallops, or rubs. Regular rate and rhythm  RESP: CTAB/L, no wheezes, rales or rhonchi  ABD: Normal bowel sounds; soft; diffuse ttp no non distended; no organoomegaly. No CVA tenderness  EXT: Normal ROM. No calf tenderness or edema. Distal pulses intact  NEURO: Awake and alert, oriented. Grossly unremarkable. No focal deficits.  SKIN: Skin exam is warm and dry, diaphoretic

## 2025-06-07 NOTE — ED PROVIDER NOTE - CLINICAL SUMMARY MEDICAL DECISION MAKING FREE TEXT BOX
Patient presented with nausea and vomiting x 2 days as documented.  Patient with recent history of sepsis secondary to colitis and is status post IV fluids and IV antibiotics inpatient with improvement and subsequently discharged.  Patient states that he feels like he is getting a flare of this.  On arrival to ED, patient tender on abdominal exam but otherwise afebrile, hemodynamically stable.  Obtained labs which were grossly unremarkable including no significant leukocytosis, anemia, signs of dehydration/KRISTY, transaminitis or significant electrolyte abnormalities.  EKG nonischemic. Chest xray negative for pneumothorax, pneumonia, widened mediastinum, evidence of rib fractures, enlarged cardiac silhouette or any other emergent pathologies.  CT of the abdomen and pelvis showed recurrence of colitis, no evidence of bowel obstruction or any other emergent pathologies.  Attempted symptomatic control in the ED, however despite this, patient unable to tolerate p.o. and given the above, patient will require admission for IV hydration and further management.  Patient agreeable with plan.  Hemodynamically stable at time of admission.

## 2025-06-07 NOTE — H&P ADULT - ATTENDING COMMENTS
Assessment    Abdominal pain now completely resolved, possibly secondary to colitis vs. hypermesis syndrome  Elevated lactate now trending down  Asthma    Plan    - at the moment patient is completely asymptomatic, was sleeping on arrival and when woken up states he's not having any pain at all, c/w cipro / flagyl, GI follow up as patient had recent colitis and elevated lactate on admission although trending down  - not having any nausea currently  - c/w IVF for now    Pending: GI follow up    # DVT PPX: lovenox    GENERAL: NAD, lying in bed comfortably  HEAD:  Atraumatic, normocephalic  NERVOUS SYSTEM:  A&Ox3, moving all extremities, no focal deficits   EYES: EOMI, PERRL  NECK: Supple, trachea midline, no JVD  HEART: Regular rate and rhythm  LUNGS: Clear to auscultation bilaterally, no crackles, wheezing, or rhonchi  ABDOMEN: Soft, nontender, nondistended, +BS  EXTREMITIES: 2+ peripheral pulses bilaterally. No clubbing, cyanosis, or edema    75 minutes spent on review of labs, imaging studies, old records, obtaining history, personally examining patient, counselling and communicating with patient/ family, entering orders for medications/tests/etc, discussions with other health care providers, documentation in electronic health records, independent interpretation of labs, imaging/procedure results and care coordination.

## 2025-06-07 NOTE — ED ADULT NURSE NOTE - NSFALLUNIVINTERV_ED_ALL_ED
Bed/Stretcher in lowest position, wheels locked, appropriate side rails in place/Call bell, personal items and telephone in reach/Instruct patient to call for assistance before getting out of bed/chair/stretcher/Non-slip footwear applied when patient is off stretcher/[place] to call system/Physically safe environment - no spills, clutter or unnecessary equipment/Purposeful proactive rounding/Room/bathroom lighting operational, light cord in reach

## 2025-06-07 NOTE — H&P ADULT - HISTORY OF PRESENT ILLNESS
30-year-old male with a past medical history of asthma, daily marijuana use, and prior ethanol abuse presented to the ED with two days of nausea and vomiting.Pt claims to use Marijuana actively  He was recently seen in the ED on May 28th for severe sepsis secondary to colitis and mild acute kidney injury with transient transaminitis. He was treated with IV antibiotics and fluids and discharged on oral antibiotics. Patient also denies any recent use of alcohol.He reported abstaining from alcohol for the past few days   He denied  any tremors , hallucinations , insomnia recent URI symptoms, shortness of breath, chest pain, fever, chills, trauma, rash, or edema.       Vitally Stable in Ed     On Labs WBCs 13.70 e Neutrophil predominance , lactate 2.3 , AG 23   creat 1.0 around baseline     On CT A/P :   Transverse colon mild wall thickening may be due to mild colitis versus   underdistention. No bowel obstruction.    EKG showed Line Normal sinus rhythm with sinus arrhythmia    s/p 2L LR ,famotidine 20 mg IV , halodo5 mg , Toradol 15 mg and Zofran 4 mg stat       admitted for further workup .      30-year-old male with a past medical history of asthma, daily marijuana use, and prior ethanol abuse presented to the ED with two days of nausea and vomiting. Pt claims to use Marijuana actively  He was recently seen in the ED on May 28th for severe sepsis secondary to colitis and mild acute kidney injury with transient transaminitis. He was treated with IV antibiotics and fluids and discharged on oral antibiotics. Patient also denies any recent use of alcohol.He reported abstaining from alcohol for the past few days   He denied  any tremors , hallucinations , insomnia recent URI symptoms, shortness of breath, chest pain, fever, chills, trauma, rash, or edema.     On PE pt was sleeping comfortably  , upon walking waking pt claims his vomiting to be improved since arrival to the hospital. Have  mild diffuse tender on belly exam.    Vitally Stable in Ed     On Labs WBCs 13.70 e Neutrophil predominance , lactate 2.3 , AG 23   creat 1.0 around baseline     On CT A/P :   Transverse colon mild wall thickening may be due to mild colitis versus   underdistention. No bowel obstruction.    EKG showed Line Normal sinus rhythm with sinus arrhythmia    s/p 2L LR ,famotidine 20 mg IV , halodo5 mg , Toradol 15 mg and Zofran 4 mg stat       admitted for further workup .

## 2025-06-07 NOTE — H&P ADULT - ASSESSMENT
30-year-old male with a past medical history of asthma, daily marijuana use, and prior ethanol abuse presented to the ED with two days of nausea and vomiting.Pt claims to use Marijuana actively . He was recently seen in the ED on May 28th for severe sepsis secondary to colitis and mild acute kidney injury with transient transaminitis. He was treated with IV antibiotics and fluids and discharged on oral antibiotics. Patient also denies any recent use of alcohol.He reported abstaining from alcohol for the past few days   He denied  any tremors , hallucinations , insomnia recent URI symptoms, shortness of breath, chest pain, fever, chills, trauma, rash, or edema.        #Sepsis POA 2/2 Mild Colitis   #Hyperemesis syndrome likely 2/2 Marijuana Use / Active Smoker  #HAGMA   #ETOH abuse history  -On vitals in Ed  HR 98 , BP stable , afebrile on RA   -WBCs 13.70 e Neutrophil predominance  -lactate 2.3   -AG 23   -On CT A/P :   Transverse colon mild wall thickening may be due to mild colitis versus   underdistention. No bowel obstruction.    -EKG showed Line Normal sinus rhythm with sinus arrhythmia    -s/p 2L LR ,famotidine 20 mg IV , halodo5 mg , Toradol 15 mg and Zofran 4   PLAN :   -c/w IVF   -starting on cipro and flagyl   -f/u infectious workup ie Bcx , repeat lactate   -ZOFRAN 4mg Q6 hrs PRN , hold if qtc >500   -f/u GI consult   -f/u CATCH team   -f/u Addiction team c/s   -trend WBCs ( pt has chronic leukocytosis)  - advance diet as tolerate  -f/u repeat labs in am   - c/w home thiamine and folic acid      #Transaminitis  - AST 36, ALT 46( trending down )   - likely elevated in acute illness  - continue to monitor    #Asthma - not in exacerbation  - c/w home albuterol PRN        #DVT ppx: Lovenox SubQ  #GI ppx: n/a  #Diet: advance as tolerate - regular  #Activity: AAT  #Code Status: Full Code         med rec done          30-year-old male with a past medical history of asthma, daily marijuana use, and prior ethanol abuse presented to the ED with two days of nausea and vomiting.Pt claims to use Marijuana actively . He was recently seen in the ED on May 28th for severe sepsis secondary to colitis and mild acute kidney injury with transient transaminitis. He was treated with IV antibiotics and fluids and discharged on oral antibiotics. Patient also denies any recent use of alcohol.He reported abstaining from alcohol for the past few days   He denied  any tremors , hallucinations , insomnia recent URI symptoms, shortness of breath, chest pain, fever, chills, trauma, rash, or edema.        # Mild Colitis   #Sepsis ?  #Hyperemesis syndrome likely 2/2 Marijuana Use / Active Smoker  #HAGMA   #ETOH abuse history  -On vitals in Ed  HR 98 , BP stable , afebrile on RA   -WBCs 13.70 e Neutrophil predominance (not new)  -lactate 2.3 , could bcz of dehydration only   -AG 23   -On CT A/P :   Transverse colon mild wall thickening may be due to mild colitis versus   underdistention. No bowel obstruction.    -EKG showed Line Normal sinus rhythm with sinus arrhythmia    -s/p 2L LR ,famotidine 20 mg IV , halodo5 mg , Toradol 15 mg and Zofran 4   PLAN :   -c/w IVF   -starting on cipro and flagyl   -f/u infectious workup ie Bcx , repeat lactate   -ZOFRAN 4mg Q6 hrs PRN , hold if qtc >500   -f/u GI consult   -f/u CATCH team   -f/u Addiction team c/s   -trend WBCs ( pt has chronic leukocytosis)  - advance diet as tolerate  -f/u repeat labs in am   - c/w home thiamine and folic acid      #Transaminitis  - AST 36, ALT 46( trending down )   - likely elevated in acute illness  - continue to monitor    #Asthma - not in exacerbation  - c/w home albuterol PRN        #DVT ppx: Lovenox SubQ  #GI ppx: n/a  #Diet: advance as tolerate - regular  #Activity: AAT  #Code Status: Full Code         med rec done          30-year-old male with a past medical history of asthma, daily marijuana use, and prior ethanol abuse presented to the ED with two days of nausea and vomiting.Pt claims to use Marijuana actively . He was recently seen in the ED on May 28th for severe sepsis secondary to colitis and mild acute kidney injury with transient transaminitis. He was treated with IV antibiotics and fluids and discharged on oral antibiotics. Patient also denies any recent use of alcohol.He reported abstaining from alcohol for the past few days   He denied  any tremors , hallucinations , insomnia recent URI symptoms, shortness of breath, chest pain, fever, chills, trauma, rash, or edema.      # Mild Colitis   #Sepsis ?  #Hyperemesis syndrome likely 2/2 Marijuana Use / Active Smoker  #HAGMA   #ETOH abuse history  -On vitals in Ed  HR 98 , BP stable , afebrile on RA   -WBCs 13.70 e Neutrophil predominance (not new)  -lactate 2.3 , could bcz of dehydration only   -AG 23   -On CT A/P :   Transverse colon mild wall thickening may be due to mild colitis versus   underdistention. No bowel obstruction.    -EKG showed Line Normal sinus rhythm with sinus arrhythmia    -s/p 2L LR ,famotidine 20 mg IV , halodo5 mg , Toradol 15 mg and Zofran 4   PLAN :   -c/w IVF   -starting on cipro and flagyl   -f/u infectious workup ie Bcx , repeat lactate   -ZOFRAN 4mg Q6 hrs PRN , hold if qtc >500   -f/u GI consult   -f/u CATCH team   -f/u Addiction team c/s   -trend WBCs ( pt has chronic leukocytosis)  - advance diet as tolerate  -f/u repeat labs in am   - c/w home thiamine and folic acid      #Transaminitis  - AST 36, ALT 46( trending down )   - likely elevated in acute illness  - continue to monitor    #Asthma - not in exacerbation  - c/w home albuterol PRN        #DVT ppx: Lovenox SubQ  #GI ppx: n/a  #Diet: advance as tolerate - regular  #Activity: AAT  #Code Status: Full Code         med rec done

## 2025-06-07 NOTE — ED ADULT TRIAGE NOTE - CHIEF COMPLAINT QUOTE
Patient BIBA c/o abdominal pain/ cramping associated with nausea, vomiting and sweats after stopping abx and drinking last night. Patient was being treated for colon infection

## 2025-06-07 NOTE — ED ADULT NURSE NOTE - OBJECTIVE STATEMENT
Pt. c/o abdominal pain, nausea, vomiting, and sweats since this morning. Pt. states he drank last night. Pt. was on PO abx for colon infection.

## 2025-06-08 VITALS
TEMPERATURE: 98 F | SYSTOLIC BLOOD PRESSURE: 127 MMHG | OXYGEN SATURATION: 99 % | HEART RATE: 75 BPM | RESPIRATION RATE: 18 BRPM | DIASTOLIC BLOOD PRESSURE: 68 MMHG

## 2025-06-08 LAB
ALBUMIN SERPL ELPH-MCNC: 4.2 G/DL — SIGNIFICANT CHANGE UP (ref 3.5–5.2)
ALP SERPL-CCNC: 82 U/L — SIGNIFICANT CHANGE UP (ref 30–115)
ALT FLD-CCNC: 36 U/L — SIGNIFICANT CHANGE UP (ref 0–41)
ANION GAP SERPL CALC-SCNC: 12 MMOL/L — SIGNIFICANT CHANGE UP (ref 7–14)
AST SERPL-CCNC: 28 U/L — SIGNIFICANT CHANGE UP (ref 0–41)
BASOPHILS # BLD AUTO: 0.08 K/UL — SIGNIFICANT CHANGE UP (ref 0–0.2)
BASOPHILS NFR BLD AUTO: 0.6 % — SIGNIFICANT CHANGE UP (ref 0–1)
BILIRUB SERPL-MCNC: 0.6 MG/DL — SIGNIFICANT CHANGE UP (ref 0.2–1.2)
BUN SERPL-MCNC: 9 MG/DL — LOW (ref 10–20)
CALCIUM SERPL-MCNC: 9.4 MG/DL — SIGNIFICANT CHANGE UP (ref 8.4–10.5)
CHLORIDE SERPL-SCNC: 101 MMOL/L — SIGNIFICANT CHANGE UP (ref 98–110)
CO2 SERPL-SCNC: 21 MMOL/L — SIGNIFICANT CHANGE UP (ref 17–32)
CREAT SERPL-MCNC: 0.8 MG/DL — SIGNIFICANT CHANGE UP (ref 0.7–1.5)
EGFR: 124 ML/MIN/1.73M2 — SIGNIFICANT CHANGE UP
EGFR: 124 ML/MIN/1.73M2 — SIGNIFICANT CHANGE UP
EOSINOPHIL # BLD AUTO: 0.1 K/UL — SIGNIFICANT CHANGE UP (ref 0–0.7)
EOSINOPHIL NFR BLD AUTO: 0.8 % — SIGNIFICANT CHANGE UP (ref 0–8)
GLUCOSE SERPL-MCNC: 103 MG/DL — HIGH (ref 70–99)
HCT VFR BLD CALC: 43.8 % — SIGNIFICANT CHANGE UP (ref 42–52)
HGB BLD-MCNC: 15.7 G/DL — SIGNIFICANT CHANGE UP (ref 14–18)
IMM GRANULOCYTES NFR BLD AUTO: 0.8 % — HIGH (ref 0.1–0.3)
LACTATE SERPL-SCNC: 1.5 MMOL/L — SIGNIFICANT CHANGE UP (ref 0.7–2)
LYMPHOCYTES # BLD AUTO: 19.9 % — LOW (ref 20.5–51.1)
LYMPHOCYTES # BLD AUTO: 2.46 K/UL — SIGNIFICANT CHANGE UP (ref 1.2–3.4)
MAGNESIUM SERPL-MCNC: 2 MG/DL — SIGNIFICANT CHANGE UP (ref 1.8–2.4)
MCHC RBC-ENTMCNC: 30.5 PG — SIGNIFICANT CHANGE UP (ref 27–31)
MCHC RBC-ENTMCNC: 35.8 G/DL — SIGNIFICANT CHANGE UP (ref 32–37)
MCV RBC AUTO: 85 FL — SIGNIFICANT CHANGE UP (ref 80–94)
MONOCYTES # BLD AUTO: 1.19 K/UL — HIGH (ref 0.1–0.6)
MONOCYTES NFR BLD AUTO: 9.6 % — HIGH (ref 1.7–9.3)
NEUTROPHILS # BLD AUTO: 8.42 K/UL — HIGH (ref 1.4–6.5)
NEUTROPHILS NFR BLD AUTO: 68.3 % — SIGNIFICANT CHANGE UP (ref 42.2–75.2)
NRBC BLD AUTO-RTO: 0 /100 WBCS — SIGNIFICANT CHANGE UP (ref 0–0)
PLATELET # BLD AUTO: 371 K/UL — SIGNIFICANT CHANGE UP (ref 130–400)
PMV BLD: 9.6 FL — SIGNIFICANT CHANGE UP (ref 7.4–10.4)
POTASSIUM SERPL-MCNC: 4 MMOL/L — SIGNIFICANT CHANGE UP (ref 3.5–5)
POTASSIUM SERPL-SCNC: 4 MMOL/L — SIGNIFICANT CHANGE UP (ref 3.5–5)
PROT SERPL-MCNC: 6.4 G/DL — SIGNIFICANT CHANGE UP (ref 6–8)
RBC # BLD: 5.15 M/UL — SIGNIFICANT CHANGE UP (ref 4.7–6.1)
RBC # FLD: 13.6 % — SIGNIFICANT CHANGE UP (ref 11.5–14.5)
SODIUM SERPL-SCNC: 134 MMOL/L — LOW (ref 135–146)
WBC # BLD: 12.35 K/UL — HIGH (ref 4.8–10.8)
WBC # FLD AUTO: 12.35 K/UL — HIGH (ref 4.8–10.8)

## 2025-06-08 PROCEDURE — 99223 1ST HOSP IP/OBS HIGH 75: CPT

## 2025-06-08 PROCEDURE — 99239 HOSP IP/OBS DSCHRG MGMT >30: CPT

## 2025-06-08 RX ADMIN — Medication 1 TABLET(S): at 11:10

## 2025-06-08 RX ADMIN — Medication 200 MILLIGRAM(S): at 05:13

## 2025-06-08 RX ADMIN — Medication 100 MILLIGRAM(S): at 05:14

## 2025-06-08 RX ADMIN — FOLIC ACID 1 MILLIGRAM(S): 1 TABLET ORAL at 11:10

## 2025-06-08 RX ADMIN — Medication 100 MILLIGRAM(S): at 13:25

## 2025-06-08 RX ADMIN — SODIUM CHLORIDE 75 MILLILITER(S): 9 INJECTION, SOLUTION INTRAVENOUS at 01:52

## 2025-06-08 RX ADMIN — Medication 100 MILLIGRAM(S): at 11:10

## 2025-06-08 NOTE — CONSULT NOTE ADULT - ASSESSMENT
30-year-old male with a past medical history of asthma, daily marijuana use, and prior ethanol abuse presented to the ED with two days of nausea and vomiting. Rest of care as per primary team.       # Abdominal pain/ N/V likely 2/2 gastritis vs cannabis hyperemesis syndrome- now improved  - HD stable   - Not septic on admission   - LFTs wnl   - Lactate 7.6 on admission likely 2/2 dehydration, repeat normal today   - CT AP with IVC 6/7:   Transverse colon mild wall thickening may be due to mild colitis versus underdistention. No bowel obstruction.  Nonspecific mild periportal edema.          Plan   - Supportive tx   - PPI PO QD   - IVF/ antiemetics prn   - Diet as tolerated  - Obtain RUQ to r/o biliary colic   - Avoid NSAIDs   - If pt recurs can apply topical capsaicin on belly  - If N/V recurs and is severe can consider haldol   - Marijuana cessation advised   - Follow up as OP with [facility] for possible EGD  - Follow up with our [facility] located at [address] [place] [address]. Phone Number: [phone]

## 2025-06-08 NOTE — DISCHARGE NOTE PROVIDER - NSDCCPCAREPLAN_GEN_ALL_CORE_FT
PRINCIPAL DISCHARGE DIAGNOSIS  Diagnosis: Intractable nausea and vomiting  Assessment and Plan of Treatment: please avoid alcohol and marijuana  - please follow up with GI for further assessment of hepatic steatosis and if an EGD is warranted as op

## 2025-06-08 NOTE — DISCHARGE NOTE PROVIDER - CARE PROVIDER_API CALL
Ambar Levin  Gastroenterology  [address] [place] [address]  Phone: [phone]  Fax: [phone]  Follow Up Time:

## 2025-06-08 NOTE — PROGRESS NOTE ADULT - SUBJECTIVE AND OBJECTIVE BOX
Patient is a 30y old  Male who presents with a chief complaint of abdominal pain (08 Jun 2025 10:01)      SUBJECTIVE / OVERNIGHT EVENTS: Patient states his bad pain has improved and has not n/v today and not received any anti emetics. Only able to tolerate liquids so far so would prefer he tolerate solids prior to leaving. Stated he could leave after lunch if he tolerated solids, however he could only keep the soup down and not try the solids.   ADDITIONAL REVIEW OF SYSTEMS: as above    MEDICATIONS  (STANDING):  ciprofloxacin   IVPB 400 milliGRAM(s) IV Intermittent every 12 hours  ciprofloxacin   IVPB      enoxaparin Injectable 40 milliGRAM(s) SubCutaneous every 24 hours  folic acid 1 milliGRAM(s) Oral daily  lactated ringers. 1000 milliLiter(s) (75 mL/Hr) IV Continuous <Continuous>  lactobacillus acidophilus 1 Tablet(s) Oral daily  metroNIDAZOLE  IVPB      metroNIDAZOLE  IVPB 500 milliGRAM(s) IV Intermittent every 8 hours  thiamine 100 milliGRAM(s) Oral daily    MEDICATIONS  (PRN):  albuterol    90 MICROgram(s) HFA Inhaler 2 Puff(s) Inhalation every 6 hours PRN for bronchospasm  ondansetron Injectable 4 milliGRAM(s) IV Push every 6 hours PRN Nausea and/or Vomiting      CAPILLARY BLOOD GLUCOSE        I&O's Summary      PHYSICAL EXAM:  Vital Signs Last 24 Hrs  T(C): 36.6 (08 Jun 2025 07:46), Max: 36.8 (07 Jun 2025 15:59)  T(F): 97.8 (08 Jun 2025 07:46), Max: 98.2 (07 Jun 2025 15:59)  HR: 75 (08 Jun 2025 07:46) (75 - 86)  BP: 127/68 (08 Jun 2025 07:46) (119/68 - 130/85)  BP(mean): 85 (08 Jun 2025 02:00) (85 - 100)  RR: 18 (08 Jun 2025 07:46) (18 - 18)  SpO2: 99% (08 Jun 2025 07:46) (95% - 99%)    Parameters below as of 08 Jun 2025 07:46  Patient On (Oxygen Delivery Method): room air      CONSTITUTIONAL: NAD  EYES: PERRLA; conjunctiva and sclera clear  ENMT: Moist oral mucosa, no pharyngeal injection or exudates; normal dentition  NECK: Supple, no palpable masses; no thyromegaly  RESPIRATORY: Normal respiratory effort; lungs are clear to auscultation bilaterally  CARDIOVASCULAR: Regular rate and rhythm, normal S1 and S2, no murmur/rub/gallop; No lower extremity edema; Peripheral pulses are 2+ bilaterally  ABDOMEN: mild tenderness in epigastric/umbilical, normoactive bowel sounds, no rebound/guarding  MUSCULOSKELETAL:  Normal gait; no clubbing or cyanosis of digits; no joint swelling or tenderness to palpation  PSYCH: A+O to person, place, and time; affect appropriate  SKIN: No rashes; no palpable lesions    LABS:                        15.7   12.35 )-----------( 371      ( 08 Jun 2025 07:19 )             43.8     06-08    134[L]  |  101  |  9[L]  ----------------------------<  103[H]  4.0   |  21  |  0.8    Ca    9.4      08 Jun 2025 07:19  Mg     2.0     06-08    TPro  6.4  /  Alb  4.2  /  TBili  0.6  /  DBili  x   /  AST  28  /  ALT  36  /  AlkPhos  82  06-08          Urinalysis Basic - ( 08 Jun 2025 07:19 )    Color: x / Appearance: x / SG: x / pH: x  Gluc: 103 mg/dL / Ketone: x  / Bili: x / Urobili: x   Blood: x / Protein: x / Nitrite: x   Leuk Esterase: x / RBC: x / WBC x   Sq Epi: x / Non Sq Epi: x / Bacteria: x          RADIOLOGY & ADDITIONAL TESTS:  Results Reviewed:   Imaging Personally Reviewed:  Electrocardiogram Personally Reviewed:    COORDINATION OF CARE:  Care Discussed with Consultants/Other Providers [Y/N]:  Prior or Outpatient Records Reviewed [Y/N]:

## 2025-06-08 NOTE — DISCHARGE NOTE PROVIDER - NSDCMRMEDTOKEN_GEN_ALL_CORE_FT
Albuterol (Eqv-ProAir HFA) 90 mcg/inh inhalation aerosol: 2 inhaled 4 times a day as needed for  bronchospasm  folic acid 1 mg oral tablet: 1 tab(s) orally once a day  lactobacillus acidophilus oral capsule: 1 cap(s) orally once a day  thiamine 100 mg oral tablet: 1 tab(s) orally once a day

## 2025-06-08 NOTE — DISCHARGE NOTE PROVIDER - HOSPITAL COURSE
30-year-old male with a past medical history of asthma, s/p appendectomy (no rupture),daily marijuana use, and prior ethanol abuse presented to the ED with two days of nausea and vomiting in the setting of active marijuana use and recent sepsis from colitis still showing possible thickness vs underdistension of tranverse colon.       #N/V/abd pain with h/o of recent colitis and active marijuana use  #h/o of appendectomy  #ETOH abuse history  - WBC downtrending with improvement in left shift and normalization of lactate  - On CT A/P : Transverse colon mild wall thickening may be due to mild colitis versus   underdistention. No bowel obstruction.  - afebrile  - not requiring antiemetics   - alcohol level <10  -EKG showed Line Normal sinus rhythm with sinus arrhythmia  - continue cipro flagyl given leukocytosis; unlikely reactive given no emesis since admission  -s/p 2L LR ,famotidine 20 mg IV , halodo5 mg , Toradol 15 mg and Zofran 4   - can stop IVF as he is eating  - GI consult appreciated    - appendectomy done prior to any appendiceal rupture as per patient so no sibo or gastroparesis  - CTAP does show thickening towards the pylorus of the stomach so could possibly evaluate with an EGD if no improvement  -f/u CATCH team   -f/u Addiction team c/s   - c/w home thiamine and folic acid  - patient needs to try solid food before he can go home      #Transaminitis, resolved  - AST 36, ALT 46( trending down )   - steatosis of liver noted on CTAP in the setting of alcohol use  - abstinence, CATCH team    #Asthma - not in exacerbation  - c/w home albuterol PRN      #DVT ppx: Lovenox SubQ  #GI ppx: n/a  #Diet: advance as tolerate - regular  #Activity: AAT  #Code Status: Full Code    Patient tolerated a regular diet, no abd pain, and no n/v. Will go home and recommended to abstain from alcohol and cannabis. Needs op GI appt for follow up if an EGD is required as well as management of his steatosis to avoid progression to cirrhosis.

## 2025-06-08 NOTE — DISCHARGE NOTE PROVIDER - ATTENDING DISCHARGE PHYSICAL EXAMINATION:
CONSTITUTIONAL: NAD  EYES: PERRLA; conjunctiva and sclera clear  ENMT: Moist oral mucosa, no pharyngeal injection or exudates; normal dentition  NECK: Supple, no palpable masses; no thyromegaly  RESPIRATORY: Normal respiratory effort; lungs are clear to auscultation bilaterally  CARDIOVASCULAR: Regular rate and rhythm, normal S1 and S2, no murmur/rub/gallop; No lower extremity edema; Peripheral pulses are 2+ bilaterally  ABDOMEN: mild tenderness in epigastric/umbilical, normoactive bowel sounds, no rebound/guarding  MUSCULOSKELETAL:  Normal gait; no clubbing or cyanosis of digits; no joint swelling or tenderness to palpation  PSYCH: A+O to person, place, and time; affect appropriate  SKIN: No rashes; no palpable lesions

## 2025-06-08 NOTE — CONSULT NOTE ADULT - SUBJECTIVE AND OBJECTIVE BOX
Gastroenterology Consultation:    Patient is a 30y old  Male who presents with a chief complaint of abdominal pain (07 Jun 2025 17:41)        Admitted on: 06-07-25      HPI:  30-year-old male with a past medical history of asthma, daily marijuana use, and prior ethanol abuse presented to the ED with two days of nausea and vomiting. Pt claims to use Marijuana actively . Pt denies any recent use of alcohol. He reported abstaining from alcohol for the past few days   He denied  any tremors , hallucinations , insomnia recent URI symptoms, shortness of breath, chest pain, fever, chills, trauma, rash, or edema.     On PE pt was sleeping comfortably  , upon walking waking pt claims his vomiting to be improved since arrival to the hospital. Have  mild diffuse tender on belly exam.    Vitally Stable in Ed     On Labs WBCs 13.70 e Neutrophil predominance , lactate 2.3 , AG 23   creat 1.0 around baseline   On CT A/P :   Transverse colon mild wall thickening may be due to mild colitis versus   underdistention. No bowel obstruction.  EKG showed Line Normal sinus rhythm with sinus arrhythmia  s/p 2L LR ,famotidine 20 mg IV , halodo5 mg , Toradol 15 mg and Zofran 4 mg stat   admitted for further workup .      (07 Jun 2025 17:41)        Prior EGD: none   Prior Colonoscopy: none       PAST MEDICAL & SURGICAL HISTORY:  Asthma  Alcohol abuse  Marijuana smoker  History of appendectomy      FAMILY HISTORY: non pertinent       Social History:  daily marijuana use     Home Medications:  Albuterol (Eqv-ProAir HFA) 90 mcg/inh inhalation aerosol: 2 inhaled 4 times a day as needed for  bronchospasm (21 Dec 2024 19:03)        MEDICATIONS  (STANDING):  ciprofloxacin   IVPB 400 milliGRAM(s) IV Intermittent every 12 hours  ciprofloxacin   IVPB      enoxaparin Injectable 40 milliGRAM(s) SubCutaneous every 24 hours  folic acid 1 milliGRAM(s) Oral daily  lactated ringers. 1000 milliLiter(s) (75 mL/Hr) IV Continuous <Continuous>  lactobacillus acidophilus 1 Tablet(s) Oral daily  metroNIDAZOLE  IVPB      metroNIDAZOLE  IVPB 500 milliGRAM(s) IV Intermittent every 8 hours  thiamine 100 milliGRAM(s) Oral daily    MEDICATIONS  (PRN):  albuterol    90 MICROgram(s) HFA Inhaler 2 Puff(s) Inhalation every 6 hours PRN for bronchospasm  ondansetron Injectable 4 milliGRAM(s) IV Push every 6 hours PRN Nausea and/or Vomiting      Allergies  No Known Drug Allergies  shellfish (Rash; Urticaria; Hives)      Review of Systems:   Constitutional:  No Fever, No Chills  Eyes:  No Eye Pain, No Vision Changes  Cardiovascular:  No Chest Pain, No Palpitations  Respiratory:  No Cough, No Dyspnea  Gastrointestinal:  As described in HPI              Physical Examination:  T(C): 36.6 (06-08-25 @ 07:46), Max: 36.8 (06-07-25 @ 15:59)  HR: 75 (06-08-25 @ 07:46) (75 - 86)  BP: 127/68 (06-08-25 @ 07:46) (119/68 - 130/85)  RR: 18 (06-08-25 @ 07:46) (18 - 18)  SpO2: 99% (06-08-25 @ 07:46) (95% - 99%)          GENERAL: AAOx3, no acute distress.  HEAD:  Atraumatic, Normocephalic  EYES: conjunctiva and sclera clear  NECK: Supple, no JVD or thyromegaly  CHEST/LUNG: Clear to auscultation bilaterally; No wheeze, rhonchi, or rales  HEART: Regular rate and rhythm; normal S1, S2, No murmurs.  ABDOMEN: Soft, nontender, nondistended; Bowel sounds present  NEUROLOGY: No asterixis or tremor.           Data:                        15.7   12.35 )-----------( 371      ( 08 Jun 2025 07:19 )             43.8     Hgb Trend:  15.7  06-08-25 @ 07:19  16.3  06-07-25 @ 10:08      06-08    134[L]  |  101  |  9[L]  ----------------------------<  103[H]  4.0   |  21  |  0.8    Ca    9.4      08 Jun 2025 07:19  Mg     2.0     06-08    TPro  6.4  /  Alb  4.2  /  TBili  0.6  /  DBili  x   /  AST  28  /  ALT  36  /  AlkPhos  82  06-08    Liver panel trend:  TBili 0.6   /   AST 28   /   ALT 36   /   AlkP 82   /   Tptn 6.4   /   Alb 4.2    /   DBili --      06-08  TBili 0.3   /   AST 36   /   ALT 46   /   AlkP 94   /   Tptn 7.3   /   Alb 4.7    /   DBili --      06-07  TBili 1.5   /   AST 29   /   ALT 33   /   AlkP 84   /   Tptn 6.1   /   Alb 4.0    /   DBili --      05-30              Radiology:  CT Abdomen and Pelvis w/ IV Cont:   ACC: [id number] EXAM:  CT ABDOMEN AND PELVIS IC   ORDERED BY: KWESI SOTO     PROCEDURE DATE:  06/07/2025          INTERPRETATION:  Clinical Indication: Abdominal pain, nausea and emesis   x2 days.    Technique: Multidetector-row CT images of the abdomen and pelvis were   obtained from the xiphoid through the symphysis pubis following the   administration of intravenous contrast. No oral contrast was   administered.  Coronal and sagittal reconstructions were performed.    Contrast: 100 cc Omnipaque 350 non-ionic intravenous contrast.    Comparison: CT abdomen pelvis 5/28/2025    Findings:    01. LIVER: Normal morphology.  No focal lesion. Mild periportal edema.  02. SPLEEN: Normal.  03. PANCREAS: Normal.  04. GALLBLADDER/BILIARY TREE: No biliary duct dilatation. Contracted   gallbladder.  05. ADRENALS: Normal.  06. KIDNEYS: Symmetric enhancement.  No hydronephrosis or renal stone.  07. LYMPHADENOPATHY/RETROPERITONEUM: No enlarged lymph nodes.  08. BOWEL: Questionable transverse colon wall thickening versus   underdistention. No bowel obstruction. Surgical clips are noted in the   right lower quadrant.  09. PELVIC VISCERA: Prostate measures 4.3 cm in transverse distention   (series 5 image 108). Unremarkable urinary bladder.  10.PELVIC LYMPH NODES: No enlarged lymph nodes.  11. VASCULATURE: No abdominal aortic aneurysm.  12. PERITONEUM/ABDOMINAL WALL: No gross ascites.  13. SKELETAL: No aggressive lesion.  14. LUNG BASES: No pleural effusion.    IMPRESSION:    Transverse colon mild wall thickening may be due to mild colitis versus   underdistention. No bowel obstruction.    Nonspecific mild periportal edema.    --- End of Report ---          MARIBEL AWAD MD; Resident Radiologist  This document has been electronically signed.  VINAY VALENCIA MD; Attending Radiologist  This document has been electronically signed. Jun 7 2025 11:57AM (06-07-25 @ 11:26)

## 2025-06-08 NOTE — DISCHARGE NOTE NURSING/CASE MANAGEMENT/SOCIAL WORK - NSDCVIVACCINE_GEN_ALL_CORE_FT
Tdap; 23-Feb-2025 07:51; Adrienne Saxena (TIAN); [employer]; [id number] (Exp. Date: 01-Aug-2026); IntraMuscular; Deltoid Right.; 0.5 milliLiter(s); VIS (VIS Published: 09-May-2013, VIS Presented: 23-Feb-2025);

## 2025-06-08 NOTE — DISCHARGE NOTE PROVIDER - NSDCFUSCHEDAPPT_GEN_ALL_CORE_FT
Padmini Simon  [facility] PreAdmits  Scheduled Appointment: 07/09/2025    [facility]  [facility] [address]   Scheduled Appointment: 07/09/2025

## 2025-06-10 PROBLEM — Z00.00 ENCOUNTER FOR PREVENTIVE HEALTH EXAMINATION: Status: ACTIVE | Noted: 2025-06-10

## 2025-06-12 DIAGNOSIS — R74.01 ELEVATION OF LEVELS OF LIVER TRANSAMINASE LEVELS: ICD-10-CM

## 2025-06-12 DIAGNOSIS — K52.9 NONINFECTIVE GASTROENTERITIS AND COLITIS, UNSPECIFIED: ICD-10-CM

## 2025-06-12 DIAGNOSIS — E87.20 ACIDOSIS, UNSPECIFIED: ICD-10-CM

## 2025-06-12 DIAGNOSIS — F10.90 ALCOHOL USE, UNSPECIFIED, UNCOMPLICATED: ICD-10-CM

## 2025-06-12 DIAGNOSIS — F12.90 CANNABIS USE, UNSPECIFIED, UNCOMPLICATED: ICD-10-CM

## 2025-06-12 DIAGNOSIS — F12.99 CANNABIS USE, UNSPECIFIED WITH UNSPECIFIED CANNABIS-INDUCED DISORDER: ICD-10-CM
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
## 2025-06-12 DIAGNOSIS — J45.909 UNSPECIFIED ASTHMA, UNCOMPLICATED: ICD-10-CM

## 2025-06-12 DIAGNOSIS — K76.0 FATTY (CHANGE OF) LIVER, NOT ELSEWHERE CLASSIFIED: ICD-10-CM

## 2025-06-12 DIAGNOSIS — R11.2 NAUSEA WITH VOMITING, UNSPECIFIED: ICD-10-CM

## 2025-07-09 ENCOUNTER — APPOINTMENT (OUTPATIENT)
Dept: INTERNAL MEDICINE | Facility: CLINIC | Age: 30
End: 2025-07-09

## 2025-07-23 ENCOUNTER — APPOINTMENT (OUTPATIENT)
Dept: GASTROENTEROLOGY | Facility: CLINIC | Age: 30
End: 2025-07-23